# Patient Record
Sex: FEMALE | Race: WHITE | NOT HISPANIC OR LATINO | Employment: PART TIME | URBAN - METROPOLITAN AREA
[De-identification: names, ages, dates, MRNs, and addresses within clinical notes are randomized per-mention and may not be internally consistent; named-entity substitution may affect disease eponyms.]

---

## 2017-02-09 ENCOUNTER — OFFICE VISIT (OUTPATIENT)
Dept: MEDICAL GROUP | Facility: PHYSICIAN GROUP | Age: 37
End: 2017-02-09
Payer: COMMERCIAL

## 2017-02-09 VITALS
BODY MASS INDEX: 22.02 KG/M2 | HEART RATE: 95 BPM | TEMPERATURE: 98.9 F | DIASTOLIC BLOOD PRESSURE: 70 MMHG | OXYGEN SATURATION: 100 % | SYSTOLIC BLOOD PRESSURE: 102 MMHG | HEIGHT: 64 IN | RESPIRATION RATE: 16 BRPM | WEIGHT: 129 LBS

## 2017-02-09 DIAGNOSIS — R68.89 FLU-LIKE SYMPTOMS: ICD-10-CM

## 2017-02-09 DIAGNOSIS — R10.12 LEFT UPPER QUADRANT PAIN: ICD-10-CM

## 2017-02-09 DIAGNOSIS — J01.00 ACUTE NON-RECURRENT MAXILLARY SINUSITIS: ICD-10-CM

## 2017-02-09 DIAGNOSIS — L40.50 PSORIATIC ARTHRITIS (HCC): ICD-10-CM

## 2017-02-09 LAB
FLUAV+FLUBV AG SPEC QL IA: NORMAL
INT CON NEG: NEGATIVE
INT CON POS: POSITIVE

## 2017-02-09 PROCEDURE — 87804 INFLUENZA ASSAY W/OPTIC: CPT | Performed by: NURSE PRACTITIONER

## 2017-02-09 PROCEDURE — 99204 OFFICE O/P NEW MOD 45 MIN: CPT | Performed by: NURSE PRACTITIONER

## 2017-02-09 RX ORDER — ZINC SULFATE 1 MG/ML
INJECTION, SOLUTION INTRAVENOUS
COMMUNITY
End: 2021-01-14

## 2017-02-09 RX ORDER — CHLORAL HYDRATE 500 MG
1000 CAPSULE ORAL
COMMUNITY
End: 2024-03-12

## 2017-02-09 RX ORDER — CHOLECALCIFEROL (VITAMIN D3) 125 MCG
500 CAPSULE ORAL DAILY
COMMUNITY
End: 2022-11-10

## 2017-02-09 RX ORDER — AMOXICILLIN AND CLAVULANATE POTASSIUM 500; 125 MG/1; MG/1
1 TABLET, FILM COATED ORAL 2 TIMES DAILY
Qty: 20 TAB | Refills: 0 | Status: SHIPPED | OUTPATIENT
Start: 2017-02-09 | End: 2017-03-13

## 2017-02-09 RX ORDER — FAMOTIDINE 20 MG/1
20 TABLET, FILM COATED ORAL 2 TIMES DAILY
Qty: 60 TAB | Refills: 0 | Status: SHIPPED | OUTPATIENT
Start: 2017-02-09 | End: 2017-04-24 | Stop reason: SDUPTHER

## 2017-02-09 ASSESSMENT — PATIENT HEALTH QUESTIONNAIRE - PHQ9: CLINICAL INTERPRETATION OF PHQ2 SCORE: 2

## 2017-02-09 ASSESSMENT — PAIN SCALES - GENERAL: PAINLEVEL: NO PAIN

## 2017-02-09 NOTE — ASSESSMENT & PLAN NOTE
Dx in 2014. Followed by rheumatology. Patient does have psoriasis on her scalp. States that with regard to the arthritis she has spine involvement but other pain is mostly in her fingers and feet. Patient is requesting repeat labs for new rheumatology provider. Patient is treating this with diet and over-the-counter ibuprofen dates that is working well this time. Patient does mention feeling of tightness in the muscles on her right side. States she was told in the past that it is related to psoriatic arthritis, but she states it is getting worse. Patient plans to discuss this with new rheumatology provider.

## 2017-02-09 NOTE — PROGRESS NOTES
"Chief Complaint   Patient presents with   • Establish Care     New Patient    • Cough     3 days        HISTORY OF THE PRESENT ILLNESS: This is a 36 y.o. female new patient to our practice. This pleasant patient is here today to discuss multiple issues.    Psoriatic arthritis (CMS-[facility])  Dx in 2014. Followed by rheumatology. Patient does have psoriasis on her scalp. States that with regard to the arthritis she has spine involvement but other pain is mostly in her fingers and feet. Patient is requesting repeat labs for new rheumatology provider. Patient does mention feeling of tightness in the muscles on her right side. States she was told in the past that it is related to psoriatic arthritis, but she states it is getting worse. Patient plans to discuss this with new rheumatology provider.    Left upper quadrant pain  This is a new problem. Patient states that she has noticed pain in the left upper quadrant of her abdomen \"on and off\" patient states that the pain comes and goes she has not found anything specific that makes it worse or makes it better. States the area is tender to the touch and feels \"swollen\". Patient denies changes in stool including constipation or diarrhea, dark tarry stool. Patient denies nausea or vomiting, patient denies burning. States the pain might be related to eating.    Acute non-recurrent maxillary sinusitis  Patient states that she has had significant congestion over the last 2-3 weeks. Patient states that she did run fevers initially and then thought she was getting better recently she has been running fevers and having chills, does have some pressure in her maxillary sinuses, positive for cough and ear fullness. States the cough does not keep her up at night, but that she is noticing some purulent putrid sputum. Patient has been using over-the-counter Flonase as well as decongestants to improve her symptoms.      Past Medical History   Diagnosis Date   • Depression    • Allergy    • " Psoriatic arthritis (CMS-[facility])        Past Surgical History   Procedure Laterality Date   • Primary c section         Family Status   Relation Status Death Age   • Mother Alive    • Father       Family History   Problem Relation Age of Onset   • Psychiatry Mother      depression   • Arthritis Father      psoriatic   • Hypertension Father    • Psychiatry Father      depression   • Cancer Maternal Grandmother    • Cancer Maternal Grandfather      stomach       Social History   Substance Use Topics   • Smoking status: Never Smoker    • Smokeless tobacco: Never Used   • Alcohol Use: Yes       Allergies: Doxycycline    Current Outpatient Prescriptions Ordered in [facility]   Medication Sig Dispense Refill   • sertraline (ZOLOFT) 50 MG Tab Take 50 mg by mouth every day.     • Omega-3 Fatty Acids (FISH OIL) 1000 MG Cap capsule Take 1,000 mg by mouth 3 times a day, with meals.     • zinc sulfate 1 MG/ML Solution by Intravenous route.     • cyanocobalamin (VITAMIN B-12) 500 MCG Tab Take 500 mcg by mouth every day.     • vitamin D (CHOLECALCIFEROL) 1000 UNIT Tab Take 1,000 Units by mouth every day.     • famotidine (PEPCID) 20 MG Tab Take 1 Tab by mouth 2 times a day. 60 Tab 0   • amoxicillin-clavulanate (AUGMENTIN) 500-125 MG Tab Take 1 Tab by mouth 2 times a day. 20 Tab 0     No current Epic-ordered facility-administered medications on file.       Review of Systems   Constitutional: Positive for fever, chills, negative weight loss and malaise/fatigue.   HENT:  Negative for ear pain, nosebleeds, congestion, sore throat and neck pain.    Eyes:  Negative for blurred vision.   Respiratory: Positive for cough, sputum production, negative shortness of breath and wheezing.    Cardiovascular:  Negative for chest pain, palpitations, orthopnea and leg swelling.   Gastrointestinal:  Negative for heartburn, nausea, vomiting and positive abdominal pain.   Genitourinary:  Negative for dysuria, urgency and frequency.   Musculoskeletal:  "Positive for myalgias, back pain and joint pain.   Skin:  Negative for rash and itching.   Neurological:  Negative for dizziness, tingling, tremors, sensory change, focal weakness and headaches.   Endo/Heme/Allergies:  Does not bruise/bleed easily.   Psychiatric/Behavioral:  Negative for depression, anxiety, or memory loss.     All other systems reviewed and are negative except as in HPI.    Exam: Blood pressure 102/70, pulse 95, temperature 37.2 °C (98.9 °F), resp. rate 16, height 1.626 m (5' 4.02\"), weight 58.514 kg (129 lb), last menstrual period 01/09/2017, SpO2 100 %, not currently breastfeeding.  General:  Normal appearing. No distress.  HEENT:  Normocephalic. Eyes conjunctiva clear lids without ptosis, pupils equal and reactive to light accommodation, ears normal shape and contour, canals are clear bilaterally, tympanic membranes are benign, nasal mucosa erythematous, oropharynx is with erythema, no edema or exudates. Tenderness to palpation of maxillary and frontal sinuses.  Neck:  Supple without JVD or bruit. Thyroid is not enlarged.  Pulmonary:  Clear to ausculation.  Normal effort. No rales, ronchi, or wheezing.  Cardiovascular:  Regular rate and rhythm without murmur. Carotid and radial pulses are intact and equal bilaterally.  Abdomen:  Soft, nondistended. Normal bowel sounds. Liver and spleen are not palpable. Positive tenderness in epigastric region, and left upper quadrant. No guarding, no rebound tenderness.  Neurologic:  Grossly nonfocal  Lymph:  No cervical, supraclavicular or axillary lymph nodes are palpable  Skin:  Warm and dry.  No obvious lesions.  Musculoskeletal:  Normal gait. No extremity cyanosis, clubbing, or edema. Strength is 5 out of 5 bilateral lower extremities. Patient has full range of motion in the right arm and right leg. Sensation is intact in right arm and leg.   Psych:  Normal mood and affect. Alert and oriented x3. Judgment and insight is normal.    Medical decision making:  " Nubia is a generally well-appearing 36-year-old female patient here to establish care and discuss flulike symptoms over the last couple weeks as well as left upper quadrant pain and psoriatic arthritis. With regard to psoriatic arthritis referral to rheumatology for [facility] is provided and labs requested by her office are ordered at this time. Lipid profile and comprehensive metabolic profile are also ordered at this time for screening purposes. Ultrasound of abdomen ordered to rule out inflammation of spleen, or other concerning processes as patient does have autoimmune processes which can cause organ swelling. Pepcid 20 mg twice daily recommended at this time related to epigastric pain and possibility of abdominal pain being related to reflux. History of re-sickening suggests acute sinusitis related to a previous viral upper respiratory infection. As such Augmentin 500/125 mg twice daily ×10 days is provided. Point-of-care influenza was negative. ED precautions: seek emergency evaluation for symptoms including but not limited to : crushing chest pain, chest pain associated with difficulty breathing, nausea, or sweats, heart rate irregular or too fast to count.     Please note that this dictation was created using voice recognition software. I have made every reasonable attempt to correct obvious errors, but I expect that there are errors of grammar and possibly content that I did not discover before finalizing the note.      Assessment/Plan  1. Psoriatic arthritis (CMS-HCC)  REFERRAL TO RHEUMATOLOGY    URIC A+DYLON+RA QN+CRP+ASO    WESTERGREN SED RATE    LIPID PROFILE    COMP METABOLIC PANEL   2. Left upper quadrant pain  US-ABDOMEN COMPLETE SURVEY    famotidine (PEPCID) 20 MG Tab    US-ABDOMEN COMPLETE SURVEY   3. Acute non-recurrent maxillary sinusitis  amoxicillin-clavulanate (AUGMENTIN) 500-125 MG Tab   4. Flu-like symptoms  POCT Influenza A/B         I have placed the below orders and discussed them with  an approved delegating provider. The MA is performing the below orders under the direction of Dr. Frias.

## 2017-02-09 NOTE — ASSESSMENT & PLAN NOTE
Patient states that she has had significant congestion over the last 2-3 weeks. Patient states that she did run fevers initially and then thought she was getting better recently she has been running fevers and having chills, does have some pressure in her maxillary sinuses, positive for cough and ear fullness. States the cough does not keep her up at night, but that she is noticing some purulent putrid sputum. Patient has been using over-the-counter Flonase as well as decongestants to improve her symptoms.

## 2017-02-09 NOTE — MR AVS SNAPSHOT
"        Nubia Arce   2017 9:40 AM   Office Visit   MRN: [id number]    Department:  [facility]   Dept Phone:  [phone]    Description:  Female : 1980   Provider:  ETTA Spears           Reason for Visit     Establish Care New Patient     Cough 3 days       Allergies as of 2017     Allergen Noted Reactions    Doxycycline 2017   Vomiting    2016       You were diagnosed with     Psoriatic arthritis (CMS-HCC)   [315576]       Left upper quadrant pain   [359786]       Acute non-recurrent maxillary sinusitis   [7901823]       Flu-like symptoms   [581244]         Vital Signs     Blood Pressure Pulse Temperature Respirations Height Weight    102/70 mmHg 95 37.2 °C (98.9 °F) 16 1.626 m (5' 4.02\") 58.514 kg (129 lb)    Body Mass Index Oxygen Saturation Last Menstrual Period Breastfeeding? Smoking Status       22.13 kg/m2 100% 2017 No Never Smoker        Basic Information     Date Of Birth Sex Race Ethnicity Preferred Language    1980 Female White Non- English      Your appointments     Mar 09, 2017  1:00 PM   Established Patient with ETTA Spears   [facility] (--)    [address]  [address]  [facility] [place] [facility] [id number] [phone]           You will be receiving a confirmation call a few days before your appointment from our automated call confirmation system.              Problem List              ICD-10-CM Priority Class Noted - Resolved    Psoriatic arthritis (CMS-HCC) L40.50   2017 - Present    Left upper quadrant pain R10.12   2017 - Present    Acute non-recurrent maxillary sinusitis J01.00   2017 - Present      Health Maintenance        Date Due Completion Dates    IMM DTaP/Tdap/Td Vaccine (1 - Tdap) 10/29/1998 ---    PAP SMEAR 10/29/2000 ---    IMM INFLUENZA (1) 2016 ---            Current Immunizations     No immunizations on file.      Below and/or attached are the medications your provider " expects you to take. Review all of your home medications and newly ordered medications with your provider and/or pharmacist. Follow medication instructions as directed by your provider and/or pharmacist. Please keep your medication list with you and share with your provider. Update the information when medications are discontinued, doses are changed, or new medications (including over-the-counter products) are added; and carry medication information at all times in the event of emergency situations     Allergies:  DOXYCYCLINE - Vomiting               Medications  Valid as of: February 09, 2017 - 10:51 AM    Generic Name Brand Name Tablet Size Instructions for use    Amoxicillin-Pot Clavulanate (Tab) AUGMENTIN 500-125 MG Take 1 Tab by mouth 2 times a day.        Cholecalciferol (Tab) cholecalciferol 1000 UNIT Take 1,000 Units by mouth every day.        Cyanocobalamin (Tab) VITAMIN B-12 500 MCG Take 500 mcg by mouth every day.        Famotidine (Tab) PEPCID 20 MG Take 1 Tab by mouth 2 times a day.        Omega-3 Fatty Acids (Cap) fish oil 1000 MG Take 1,000 mg by mouth 3 times a day, with meals.        Sertraline HCl (Tab) ZOLOFT 50 MG Take 50 mg by mouth every day.        Zinc Sulfate (Solution) zinc sulfate 1 MG/ML by Intravenous route.        .                 Medicines prescribed today were sent to:     [id number] - [place] - [address] [address]    Phone: [phone] Fax: [phone]    Open 24 Hours?: No      Medication refill instructions:       If your prescription bottle indicates you have medication refills left, it is not necessary to call your provider’s office. Please contact your pharmacy and they will refill your medication.    If your prescription bottle indicates you do not have any refills left, you may request refills at any time through one of the following ways: The online [employer] system (except Urgent Care), by calling your provider’s office, or by asking your  pharmacy to contact your provider’s office with a refill request. Medication refills are processed only during regular business hours and may not be available until the next business day. Your provider may request additional information or to have a follow-up visit with you prior to refilling your medication.   *Please Note: Medication refills are assigned a new Rx number when refilled electronically. Your pharmacy may indicate that no refills were authorized even though a new prescription for the same medication is available at the pharmacy. Please request the medicine by name with the pharmacy before contacting your provider for a refill.        Your To Do List     Future Labs/Procedures Complete By Expires    COMP METABOLIC PANEL  As directed 2/9/2018    LIPID PROFILE  As directed 2/9/2018    WESTERGREN SED RATE  As directed 2/9/2018      Referral     A referral request has been sent to our patient care coordination department. Please allow 3-5 business days for us to process this request and contact you either by phone or mail. If you do not hear from us by the 5th business day, please call us at [phone].           LegalJump Access Code: [email or web address]  Expires: 3/10/2017  9:08 AM    LegalJump  A secure, online tool to manage your health information     [employer]’s LegalJump® is a secure, online tool that connects you to your personalized health information from the privacy of your home -- day or night - making it very easy for you to manage your healthcare. Once the activation process is completed, you can even access your medical information using the LegalJump jose, which is available for free in the Apple Jose store or Google Play store.     LegalJump provides the following levels of access (as shown below):   My Chart Features   [facility] Doctor [facility]  Specialists [facility]  Urgent  Care Non-[facility]  Primary Care  Doctor   Email your healthcare team securely and privately 24/7 X X X    Manage  appointments: schedule your next appointment; view details of past/upcoming appointments X      Request prescription refills. X      View recent personal medical records, including lab and immunizations X X X X   View health record, including health history, allergies, medications X X X X   Read reports about your outpatient visits, procedures, consult and ER notes X X X X   See your discharge summary, which is a recap of your hospital and/or ER visit that includes your diagnosis, lab results, and care plan. X X       How to register for [employer]:  1. Go to  [email or web address].  2. Click on the Sign Up Now box, which takes you to the New Member Sign Up page. You will need to provide the following information:  a. Enter your [employer] Access Code exactly as it appears at the top of this page. (You will not need to use this code after you’ve completed the sign-up process. If you do not sign up before the expiration date, you must request a new code.)   b. Enter your date of birth.   c. Enter your home email address.   d. Click Submit, and follow the next screen’s instructions.  3. Create a [employer] ID. This will be your [employer] login ID and cannot be changed, so think of one that is secure and easy to remember.  4. Create a [employer] password. You can change your password at any time.  5. Enter your Password Reset Question and Answer. This can be used at a later time if you forget your password.   6. Enter your e-mail address. This allows you to receive e-mail notifications when new information is available in [employer].  7. Click Sign Up. You can now view your health information.    For assistance activating your [employer] account, call [phone]

## 2017-02-09 NOTE — ASSESSMENT & PLAN NOTE
"This is a new problem. Patient states that she has noticed pain in the left upper quadrant of her abdomen \"on and off\" patient states that the pain comes and goes she has not found anything specific that makes it worse or makes it better. States the area is tender to the touch and feels \"swollen\". Patient denies changes in stool including constipation or diarrhea, dark tarry stool. Patient denies nausea or vomiting, patient denies burning. States the pain might be related to eating.  "

## 2017-02-13 ENCOUNTER — TELEPHONE (OUTPATIENT)
Dept: MEDICAL GROUP | Facility: PHYSICIAN GROUP | Age: 37
End: 2017-02-13

## 2017-02-13 RX ORDER — SERTRALINE HYDROCHLORIDE 25 MG/1
25 TABLET, FILM COATED ORAL DAILY
Qty: 90 TAB | Refills: 3 | Status: SHIPPED | OUTPATIENT
Start: 2017-02-13 | End: 2017-03-31 | Stop reason: SDUPTHER

## 2017-02-13 NOTE — TELEPHONE ENCOUNTER
Patient left a voicemail requesting Zoloft sent to [facility]. Rx was not sent and she was also saying if the dosage can be lowered to 25mg instead of 50mg. Patient states her and Kamar spoke about this at visit. LM

## 2017-02-16 ENCOUNTER — TELEPHONE (OUTPATIENT)
Dept: MEDICAL GROUP | Facility: PHYSICIAN GROUP | Age: 37
End: 2017-02-16

## 2017-02-16 DIAGNOSIS — R10.12 LEFT UPPER QUADRANT PAIN: ICD-10-CM

## 2017-02-16 NOTE — TELEPHONE ENCOUNTER
1. Caller Name: Nubia Arce                                         Call Back Number: [phone] (home)     2. Message: Patient has been notified Ultrasound has been placed.    3. Patient approves office to leave a detailed voicemail/MyChart message: N\A

## 2017-02-16 NOTE — TELEPHONE ENCOUNTER
1. Caller Name: Nubia Arce                                         Call Back Number: [phone] (home)     2. Message: Unable to reach patient left voicemail to call back.    3. Patient approves office to leave a detailed voicemail/MyChart message: N\A

## 2017-02-16 NOTE — TELEPHONE ENCOUNTER
"1. Caller Name: Nubia Arce                                         Call Back Number: [phone] (home)     2. Message: Pt was calling and stated \" she went to schedule her ultra sound and [facility] said there wasnt a record of that.\" Checked patient chart and it looks like US was cancelled and I let pt know ill follow up with her provider.    3. Patient approves office to leave a detailed voicemail/MyChart message: N\A      "

## 2017-02-21 ENCOUNTER — HOSPITAL ENCOUNTER (OUTPATIENT)
Dept: LAB | Facility: MEDICAL CENTER | Age: 37
End: 2017-02-21
Attending: NURSE PRACTITIONER
Payer: COMMERCIAL

## 2017-02-21 LAB
CRP SERPL HS-MCNC: 0.05 MG/DL (ref 0–0.75)
RHEUMATOID FACT SERPL-ACNC: <10 IU/ML (ref 0–14)
URATE SERPL-MCNC: 3.8 MG/DL (ref 1.9–8.2)

## 2017-02-21 PROCEDURE — 36415 COLL VENOUS BLD VENIPUNCTURE: CPT

## 2017-02-21 PROCEDURE — 86060 ANTISTREPTOLYSIN O TITER: CPT

## 2017-02-21 PROCEDURE — 84550 ASSAY OF BLOOD/URIC ACID: CPT

## 2017-02-21 PROCEDURE — 86140 C-REACTIVE PROTEIN: CPT

## 2017-02-21 PROCEDURE — 86038 ANTINUCLEAR ANTIBODIES: CPT

## 2017-02-21 PROCEDURE — 86431 RHEUMATOID FACTOR QUANT: CPT

## 2017-02-23 ENCOUNTER — TELEPHONE (OUTPATIENT)
Dept: MEDICAL GROUP | Facility: PHYSICIAN GROUP | Age: 37
End: 2017-02-23

## 2017-02-23 LAB
ASO AB SERPL-ACNC: <55 IU/ML (ref 0–330)
NUCLEAR IGG SER QL IA: NORMAL

## 2017-02-23 NOTE — TELEPHONE ENCOUNTER
----- Message from NIKHIL SpearsPMELI sent at 2/23/2017  8:00 AM PST -----  Please call pt and give lab results: DYLON is negative, Antistreptolysin O Titer is less than 55, here It is 3.8, CRP is 0.05, rheumatoid factor is negative.

## 2017-02-23 NOTE — TELEPHONE ENCOUNTER
1. Caller Name: Nubia Arce                                         Call Back Number: [phone] (home)     2. Message: Patient called and has been notified. Pt requested that I send her labs to Dr. Martinez office for her rheumatology apt.     3. Patient approves office to leave a detailed voicemail/MyChart message: N\A

## 2017-02-23 NOTE — TELEPHONE ENCOUNTER
1. Caller Name: Nubia Arce                                         Call Back Number: [phone] (home)     2. Message: Unable to reach patient left voicemail to call back.     3. Patient approves office to leave a detailed voicemail/MyChart message: N\A

## 2017-03-09 ENCOUNTER — TELEPHONE (OUTPATIENT)
Dept: MEDICAL GROUP | Facility: PHYSICIAN GROUP | Age: 37
End: 2017-03-09

## 2017-03-09 ENCOUNTER — HOSPITAL ENCOUNTER (OUTPATIENT)
Dept: RADIOLOGY | Facility: MEDICAL CENTER | Age: 37
End: 2017-03-09
Attending: NURSE PRACTITIONER
Payer: COMMERCIAL

## 2017-03-09 ENCOUNTER — APPOINTMENT (OUTPATIENT)
Dept: MEDICAL GROUP | Facility: PHYSICIAN GROUP | Age: 37
End: 2017-03-09
Payer: COMMERCIAL

## 2017-03-09 DIAGNOSIS — R10.12 LEFT UPPER QUADRANT PAIN: ICD-10-CM

## 2017-03-09 PROCEDURE — 76700 US EXAM ABDOM COMPLETE: CPT

## 2017-03-09 NOTE — TELEPHONE ENCOUNTER
1. Caller Name: Nubia Arce                                         Call Back Number: [phone] (home)     2. Message: Pt notified of results below.     3. Patient approves office to leave a detailed voicemail/MyChart message: N\A

## 2017-03-09 NOTE — TELEPHONE ENCOUNTER
----- Message from ETTA Spears sent at 3/9/2017  1:22 PM PST -----  Please call pt and give lab results: Ultrasound of abdomen is within normal limits. Let me know if you have any questions, we'll see you 3/13/17!

## 2017-03-09 NOTE — TELEPHONE ENCOUNTER
1. Caller Name: Nubia Arce                                         Call Back Number: [phone] (home)     2. Message: Unable to reach patient , left vm to call back.     3. Patient approves office to leave a detailed voicemail/MyChart message: N\A

## 2017-03-13 ENCOUNTER — OFFICE VISIT (OUTPATIENT)
Dept: MEDICAL GROUP | Facility: PHYSICIAN GROUP | Age: 37
End: 2017-03-13
Payer: COMMERCIAL

## 2017-03-13 VITALS
HEIGHT: 64 IN | RESPIRATION RATE: 16 BRPM | WEIGHT: 130 LBS | BODY MASS INDEX: 22.2 KG/M2 | TEMPERATURE: 98.1 F | HEART RATE: 81 BPM | OXYGEN SATURATION: 100 % | SYSTOLIC BLOOD PRESSURE: 110 MMHG | DIASTOLIC BLOOD PRESSURE: 70 MMHG

## 2017-03-13 DIAGNOSIS — L40.50 PSORIATIC ARTHRITIS (HCC): ICD-10-CM

## 2017-03-13 DIAGNOSIS — M54.2 NECK PAIN: ICD-10-CM

## 2017-03-13 DIAGNOSIS — R10.12 LEFT UPPER QUADRANT PAIN: ICD-10-CM

## 2017-03-13 PROBLEM — J01.00 ACUTE NON-RECURRENT MAXILLARY SINUSITIS: Status: RESOLVED | Noted: 2017-02-09 | Resolved: 2017-03-13

## 2017-03-13 PROCEDURE — 99214 OFFICE O/P EST MOD 30 MIN: CPT | Performed by: NURSE PRACTITIONER

## 2017-03-13 RX ORDER — CYCLOBENZAPRINE HCL 5 MG
5-10 TABLET ORAL 3 TIMES DAILY PRN
Qty: 15 TAB | Refills: 0 | Status: SHIPPED | OUTPATIENT
Start: 2017-03-13 | End: 2017-06-30

## 2017-03-13 ASSESSMENT — PAIN SCALES - GENERAL: PAINLEVEL: 3=SLIGHT PAIN

## 2017-03-13 NOTE — Clinical Note
[facility]  JOSE SpearsRDOLLY  [address]  [place] [address]  Fax: [phone]   Authorization for Release/Disclosure of   Protected Health Information   Name: NUBIA ARCE : 1980 SSN: [id number]   Address: [address] [place] [address].   [place] [address] Phone:    [phone] (home)    I authorize the entity listed below to release/disclose the PHI below to:   [facility]/JOSE SpearsRDOLLY and ETTA Spears   Provider or Entity Name:  Riya jimenez    Address   City, State, Zip   Phone:      Fax:     Reason for request: continuity of care   Information to be released:    [  ] LAST COLONOSCOPY,  including any PATH REPORT and follow-up  [  ] LAST FIT/COLOGUARD RESULT [  ] LAST DEXA  [  ] LAST MAMMOGRAM  [  ] LAST PAP  [  ] LAST LABS [  ] RETINA EXAM REPORT  [  ] IMMUNIZATION RECORDS  [  ] Release all info      [  ] Check here and initial the line next to each item to release ALL health information INCLUDING  _____ Care and treatment for drug and / or alcohol abuse  _____ HIV testing, infection status, or AIDS  _____ Genetic Testing    DATES OF SERVICE OR TIME PERIOD TO BE DISCLOSED: _____________  I understand and acknowledge that:  * This Authorization may be revoked at any time by you in writing, except if your health information has already been used or disclosed.  * Your health information that will be used or disclosed as a result of you signing this authorization could be re-disclosed by the recipient. If this occurs, your re-disclosed health information may no longer be protected by State or Federal laws.  * You may refuse to sign this Authorization. Your refusal will not affect your ability to obtain treatment.  * This Authorization becomes effective upon signing and will  on (date) __________.      If no date is indicated, this Authorization will  one (1) year from the signature date.    Name: Nubia Arce    Signature:   Date:       3/13/2017       PLEASE FAX REQUESTED RECORDS BACK TO: [phone]
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
## 2017-03-13 NOTE — ASSESSMENT & PLAN NOTE
"Patient continues to have left upper quadrant pain. States she notices it \"on and off\". Patient states that taking over-the-counter Zantac did improve pain. Abdominal ultrasound was negative. Patient does not note pain to palpation at this time. Patient denies change in stool including constipation diarrhea dark tarry stool. Patient denies nausea vomiting patient denies burning in her epigastric reason. Patient states the pain does sometimes get worse related to eating. Discussed that if this continues and does not resolve with daily use of Zantac we will consider referral to gastroenterology. Patient is encouraged to have CMP, lipid panel drawn at this time.  "
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
## 2017-03-13 NOTE — MR AVS SNAPSHOT
"Nubia Abdondaylin   3/13/2017 7:00 AM   Office Visit   MRN: [id number]    Department:  [facility]   Dept Phone:  [phone]    Description:  Female : 1980   Provider:  ETTA Spears           Reason for Visit     Follow-Up 1 Month Follow UP       Allergies as of 3/13/2017     Allergen Noted Reactions    Doxycycline 2017   Vomiting    2016       You were diagnosed with     Neck pain   [2013]       Psoriatic arthritis (CMS-HCC)   [2062]         Vital Signs     Blood Pressure Pulse Temperature Respirations Height Weight    110/70 mmHg 81 36.7 °C (98.1 °F) 16 1.626 m (5' 4\") 58.968 kg (130 lb)    Body Mass Index Oxygen Saturation Breastfeeding? Smoking Status          22.30 kg/m2 100% No Never Smoker         Basic Information     Date Of Birth Sex Race Ethnicity Preferred Language    1980 Female White Non- English      Problem List              ICD-10-CM Priority Class Noted - Resolved    Psoriatic arthritis (CMS-HCC) L40.50   2017 - Present    Left upper quadrant pain R10.12   2017 - Present    Neck pain M54.2   3/13/2017 - Present      Health Maintenance        Date Due Completion Dates    IMM DTaP/Tdap/Td Vaccine (1 - Tdap) 10/29/1998 ---    PAP SMEAR 10/29/2000 ---    IMM INFLUENZA (1) 2016 ---            Current Immunizations     No immunizations on file.      Below and/or attached are the medications your provider expects you to take. Review all of your home medications and newly ordered medications with your provider and/or pharmacist. Follow medication instructions as directed by your provider and/or pharmacist. Please keep your medication list with you and share with your provider. Update the information when medications are discontinued, doses are changed, or new medications (including over-the-counter products) are added; and carry medication information at all times in the event of emergency situations     Allergies:  DOXYCYCLINE - " Vomiting               Medications  Valid as of: March 13, 2017 -  7:37 AM    Generic Name Brand Name Tablet Size Instructions for use    Cholecalciferol (Tab) cholecalciferol 1000 UNIT Take 1,000 Units by mouth every day.        Cyanocobalamin (Tab) VITAMIN B-12 500 MCG Take 500 mcg by mouth every day.        Cyclobenzaprine HCl (Tab) FLEXERIL 5 MG Take 1-2 Tabs by mouth 3 times a day as needed.        Famotidine (Tab) PEPCID 20 MG Take 1 Tab by mouth 2 times a day.        Omega-3 Fatty Acids (Cap) fish oil 1000 MG Take 1,000 mg by mouth 3 times a day, with meals.        Sertraline HCl (Tab) ZOLOFT 25 MG Take 1 Tab by mouth every day.        Zinc Sulfate (Solution) zinc sulfate 1 MG/ML by Intravenous route.        .                 Medicines prescribed today were sent to:     [id number] - [place] - [address] [place] [address]    Phone: [phone] Fax: [phone]    Open 24 Hours?: No      Medication refill instructions:       If your prescription bottle indicates you have medication refills left, it is not necessary to call your provider’s office. Please contact your pharmacy and they will refill your medication.    If your prescription bottle indicates you do not have any refills left, you may request refills at any time through one of the following ways: The online [employer] system (except Urgent Care), by calling your provider’s office, or by asking your pharmacy to contact your provider’s office with a refill request. Medication refills are processed only during regular business hours and may not be available until the next business day. Your provider may request additional information or to have a follow-up visit with you prior to refilling your medication.   *Please Note: Medication refills are assigned a new Rx number when refilled electronically. Your pharmacy may indicate that no refills were authorized even though a new prescription for the same medication is available at the  pharmacy. Please request the medicine by name with the pharmacy before contacting your provider for a refill.        Referral     A referral request has been sent to our patient care coordination department. Please allow 3-5 business days for us to process this request and contact you either by phone or mail. If you do not hear from us by the 5th business day, please call us at [phone].           YouDo Access Code: Activation code not generated  Current YouDo Status: Active
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
## 2017-03-31 ENCOUNTER — PATIENT MESSAGE (OUTPATIENT)
Dept: MEDICAL GROUP | Facility: PHYSICIAN GROUP | Age: 37
End: 2017-03-31

## 2017-03-31 NOTE — TELEPHONE ENCOUNTER
Please call pharmacy and tell them her dose is being increased to 50mg (two 25mg tablets) daily. It is okay to give her an emergency fill. Please help her make an appointment with Kamar to follow-up.  Salome Rodriguez M.D.

## 2017-04-24 ENCOUNTER — HOSPITAL ENCOUNTER (OUTPATIENT)
Dept: LAB | Facility: MEDICAL CENTER | Age: 37
End: 2017-04-24
Attending: NURSE PRACTITIONER
Payer: COMMERCIAL

## 2017-04-24 DIAGNOSIS — L40.50 PSORIATIC ARTHRITIS (HCC): ICD-10-CM

## 2017-04-24 LAB
ALBUMIN SERPL BCP-MCNC: 4.2 G/DL (ref 3.2–4.9)
ALBUMIN/GLOB SERPL: 1.6 G/DL
ALP SERPL-CCNC: 29 U/L (ref 30–99)
ALT SERPL-CCNC: 12 U/L (ref 2–50)
ANION GAP SERPL CALC-SCNC: 5 MMOL/L (ref 0–11.9)
AST SERPL-CCNC: 14 U/L (ref 12–45)
BASOPHILS # BLD AUTO: 1.3 % (ref 0–1.8)
BASOPHILS # BLD: 0.05 K/UL (ref 0–0.12)
BILIRUB SERPL-MCNC: 0.5 MG/DL (ref 0.1–1.5)
BUN SERPL-MCNC: 13 MG/DL (ref 8–22)
CALCIUM SERPL-MCNC: 8.9 MG/DL (ref 8.5–10.5)
CHLORIDE SERPL-SCNC: 106 MMOL/L (ref 96–112)
CHOLEST SERPL-MCNC: 167 MG/DL (ref 100–199)
CK SERPL-CCNC: 37 U/L (ref 0–154)
CO2 SERPL-SCNC: 25 MMOL/L (ref 20–33)
CREAT SERPL-MCNC: 0.69 MG/DL (ref 0.5–1.4)
EOSINOPHIL # BLD AUTO: 0.08 K/UL (ref 0–0.51)
EOSINOPHIL NFR BLD: 2.1 % (ref 0–6.9)
ERYTHROCYTE [DISTWIDTH] IN BLOOD BY AUTOMATED COUNT: 41.8 FL (ref 35.9–50)
ERYTHROCYTE [SEDIMENTATION RATE] IN BLOOD BY WESTERGREN METHOD: 6 MM/HOUR (ref 0–20)
GFR SERPL CREATININE-BSD FRML MDRD: >60 ML/MIN/1.73 M 2
GLOBULIN SER CALC-MCNC: 2.7 G/DL (ref 1.9–3.5)
GLUCOSE SERPL-MCNC: 91 MG/DL (ref 65–99)
HCT VFR BLD AUTO: 42.6 % (ref 37–47)
HDLC SERPL-MCNC: 59 MG/DL
HGB BLD-MCNC: 13.4 G/DL (ref 12–16)
IMM GRANULOCYTES # BLD AUTO: 0.01 K/UL (ref 0–0.11)
IMM GRANULOCYTES NFR BLD AUTO: 0.3 % (ref 0–0.9)
LDLC SERPL CALC-MCNC: 93 MG/DL
LYMPHOCYTES # BLD AUTO: 1.47 K/UL (ref 1–4.8)
LYMPHOCYTES NFR BLD: 38.2 % (ref 22–41)
MCH RBC QN AUTO: 25 PG (ref 27–33)
MCHC RBC AUTO-ENTMCNC: 31.5 G/DL (ref 33.6–35)
MCV RBC AUTO: 79.5 FL (ref 81.4–97.8)
MONOCYTES # BLD AUTO: 0.24 K/UL (ref 0–0.85)
MONOCYTES NFR BLD AUTO: 6.2 % (ref 0–13.4)
NEUTROPHILS # BLD AUTO: 2 K/UL (ref 2–7.15)
NEUTROPHILS NFR BLD: 51.9 % (ref 44–72)
NRBC # BLD AUTO: 0 K/UL
NRBC BLD AUTO-RTO: 0 /100 WBC
PLATELET # BLD AUTO: 248 K/UL (ref 164–446)
PMV BLD AUTO: 10.1 FL (ref 9–12.9)
POTASSIUM SERPL-SCNC: 4.3 MMOL/L (ref 3.6–5.5)
PROT SERPL-MCNC: 6.9 G/DL (ref 6–8.2)
RBC # BLD AUTO: 5.36 M/UL (ref 4.2–5.4)
SODIUM SERPL-SCNC: 136 MMOL/L (ref 135–145)
TRIGL SERPL-MCNC: 73 MG/DL (ref 0–149)
WBC # BLD AUTO: 3.9 K/UL (ref 4.8–10.8)

## 2017-04-24 PROCEDURE — 36415 COLL VENOUS BLD VENIPUNCTURE: CPT

## 2017-04-24 PROCEDURE — 85025 COMPLETE CBC W/AUTO DIFF WBC: CPT

## 2017-04-24 PROCEDURE — 85652 RBC SED RATE AUTOMATED: CPT

## 2017-04-24 PROCEDURE — 80061 LIPID PANEL: CPT

## 2017-04-24 PROCEDURE — 82550 ASSAY OF CK (CPK): CPT

## 2017-04-24 PROCEDURE — 80053 COMPREHEN METABOLIC PANEL: CPT

## 2017-04-25 ENCOUNTER — TELEPHONE (OUTPATIENT)
Dept: MEDICAL GROUP | Facility: PHYSICIAN GROUP | Age: 37
End: 2017-04-25

## 2017-04-26 NOTE — TELEPHONE ENCOUNTER
----- Message from ETTA Spears sent at 4/25/2017  4:54 PM PDT -----  Please call pt and give lab results: Labs are within normal limits.     Plan to recheck in one year.

## 2017-04-26 NOTE — TELEPHONE ENCOUNTER
1. Caller Name: Nubia Arce                                           Call Back Number: [phone] (home)         Patient approves a detailed voicemail message: N\A    Called and left patient a voicemail to call back and get results from labs.

## 2017-04-28 ENCOUNTER — TELEPHONE (OUTPATIENT)
Dept: MEDICAL GROUP | Facility: PHYSICIAN GROUP | Age: 37
End: 2017-04-28

## 2017-04-28 NOTE — TELEPHONE ENCOUNTER
Patient called and left a voicemail asking if Kamar would explain her results to her from rheumatology and ones Kamar ordered. Patient saw the results in [facility] and saw her rheumatologist 04/27/2017 but the provider went over them quickly and did not really explain. Patient also saw white blood cells being low and would want to know if theres anything she should do. LM

## 2017-04-29 NOTE — TELEPHONE ENCOUNTER
"Viraj with patient regarding lab results including what low white blood cells \"leukopenia\" means discussed [facility] and [facility]. Status with patient that at this point we will likely repeat check this in one month to see if it is going up going down her staying stable. Patient continues to express concern regarding this as such I will discuss this with Dr. Frias on Monday.   "

## 2017-05-01 ENCOUNTER — TELEPHONE (OUTPATIENT)
Dept: MEDICAL GROUP | Facility: PHYSICIAN GROUP | Age: 37
End: 2017-05-01

## 2017-05-02 NOTE — TELEPHONE ENCOUNTER
Spoke with patient on the phone regarding discussion with Dr. Frias. Per patient this is consistent with previous CBCs. Patient states she did call rheumatology to find out about following up regarding this. Patient states that she will discuss this with rheumatology who will likely monitor and manage this but that she will schedule an appointment with this provider if necessary to follow-up on this issue.

## 2017-05-02 NOTE — TELEPHONE ENCOUNTER
----- Message from Maisha Frias M.D. sent at 5/1/2017  1:21 PM PDT -----  Regarding: RE: Labs  There is no previous CBC to compare. I will just monitor this and recheck in a month since this is just a mild problem. Absolute neutrophil count is within normal range. I would be concerned if the absolute neutrophil count is below 800.    Maisha Frias MD  ----- Message -----     From: Kamar Perez, A.P.RDOLLY     Sent: 5/1/2017  12:59 PM       To: Maisha Frias M.D.  Subject: Labs                                             Labs ordered by rheumatology, patient is concerned about WBC 3.9, history of psoriatic arthritis. She said Rheum was not concerned. What would be your recommendation based on her CBC?    Thank you!    Kamar

## 2017-06-30 ENCOUNTER — OFFICE VISIT (OUTPATIENT)
Dept: MEDICAL GROUP | Facility: PHYSICIAN GROUP | Age: 37
End: 2017-06-30
Payer: COMMERCIAL

## 2017-06-30 VITALS
WEIGHT: 133 LBS | TEMPERATURE: 98.1 F | BODY MASS INDEX: 22.71 KG/M2 | SYSTOLIC BLOOD PRESSURE: 108 MMHG | DIASTOLIC BLOOD PRESSURE: 60 MMHG | HEART RATE: 75 BPM | OXYGEN SATURATION: 99 % | RESPIRATION RATE: 16 BRPM | HEIGHT: 64 IN

## 2017-06-30 DIAGNOSIS — L40.50 PSORIATIC ARTHRITIS (HCC): ICD-10-CM

## 2017-06-30 DIAGNOSIS — K21.9 GASTROESOPHAGEAL REFLUX DISEASE, ESOPHAGITIS PRESENCE NOT SPECIFIED: ICD-10-CM

## 2017-06-30 DIAGNOSIS — M94.0 ACUTE COSTOCHONDRITIS: ICD-10-CM

## 2017-06-30 DIAGNOSIS — R20.8 DECREASED SENSATION OF LOWER EXTREMITY: ICD-10-CM

## 2017-06-30 PROCEDURE — 99214 OFFICE O/P EST MOD 30 MIN: CPT | Performed by: NURSE PRACTITIONER

## 2017-06-30 RX ORDER — OMEPRAZOLE 40 MG/1
40 CAPSULE, DELAYED RELEASE ORAL DAILY
Qty: 60 CAP | Refills: 0 | Status: SHIPPED | OUTPATIENT
Start: 2017-06-30 | End: 2017-09-05

## 2017-06-30 ASSESSMENT — PAIN SCALES - GENERAL: PAINLEVEL: 3=SLIGHT PAIN

## 2017-06-30 NOTE — ASSESSMENT & PLAN NOTE
Chronic in nature. Patient has been following up with rheumatology. States that she feels that rheumatology is very interested in medication management. States that she is using over-the-counter ibuprofen intermittently but that recently this has been increasing her reflux symptoms. Patient states she will continue follow-up with rheumatology regarding this issue.

## 2017-06-30 NOTE — ASSESSMENT & PLAN NOTE
This is a new problem. Patient states that since she has noticed worsening reflux symptoms she has also noticed tenderness in her left rib cage. States that it hurts worse when she stretches her arm up, when she presses on the spaces between her ribs. States that she feels this is consistent with inflammation related to her psoriatic arthritis and plans to discuss this with her rheumatologist. States as this increases she has increased her use of ibuprofen. States the pain is constant aching, which worsens with movement. States she has not tried anything to make it worse or better.
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
## 2017-06-30 NOTE — ASSESSMENT & PLAN NOTE
Chronic in nature. Patient continues to have reflux symptoms and continues to deny tenderness in the epigastric region but states that she is having burning, increased irritation in her stomach. Patient continues to have left upper quadrant aching. States she notices it more constantly at this point. Patient states that taking famotidine did improve pain, but that it has been worsening since her recent trip as she was eating a poor diet. Abdominal ultrasound was negative. Patient does have tenderness to palpation of the   between her ribs. Patient denies change in stool including constipation diarrhea dark tarry stool. Patient denies nausea vomiting patient denies burning in her epigastric reason. Patient states the pain does sometimes get worse related to eating. H. pylori stool is ordered at this time and patient will start omeprazole 40 mg daily plan will be to continue this over 2 months.

## 2017-06-30 NOTE — MR AVS SNAPSHOT
"        Nubia Wisdomivelisse   2017 7:40 AM   Office Visit   MRN: [id number]    Department:  [facility]   Dept Phone:  [phone]    Description:  Female : 1980   Provider:  ETTA Spears           Reason for Visit     Abdominal Pain x 2 months     Itching L side       Allergies as of 2017     Allergen Noted Reactions    Doxycycline 2017   Vomiting    2016       You were diagnosed with     Gastroesophageal reflux disease, esophagitis presence not specified   [0090549]       Acute costochondritis   [[id number]]       Decreased sensation of lower extremity   [[id number]]         Vital Signs     Blood Pressure Pulse Temperature Respirations Height Weight    108/60 mmHg 75 36.7 °C (98.1 °F) 16 1.626 m (5' 4\") 60.328 kg (133 lb)    Body Mass Index Oxygen Saturation Last Menstrual Period Breastfeeding? Smoking Status       22.82 kg/m2 99% 2017 No Never Smoker        Basic Information     Date Of Birth Sex Race Ethnicity Preferred Language    1980 Female White Non- English      Your appointments     Aug 03, 2017  7:40 AM   Established Patient with ETTA Spears   [facility] (--)    [address]  [address]  [facility] [address] [phone]           You will be receiving a confirmation call a few days before your appointment from our automated call confirmation system.              Problem List              ICD-10-CM Priority Class Noted - Resolved    Psoriatic arthritis (CMS-HCC) L40.50   2017 - Present    Left upper quadrant pain R10.12   2017 - Present    Neck pain M54.2   3/13/2017 - Present      Health Maintenance        Date Due Completion Dates    IMM DTaP/Tdap/Td Vaccine (1 - Tdap) 10/29/1998 ---    PAP SMEAR 10/29/2000 ---            Current Immunizations     No immunizations on file.      Below and/or attached are the medications your provider expects you to take. Review all of your home medications and newly " ordered medications with your provider and/or pharmacist. Follow medication instructions as directed by your provider and/or pharmacist. Please keep your medication list with you and share with your provider. Update the information when medications are discontinued, doses are changed, or new medications (including over-the-counter products) are added; and carry medication information at all times in the event of emergency situations     Allergies:  DOXYCYCLINE - Vomiting               Medications  Valid as of: June 30, 2017 -  8:32 AM    Generic Name Brand Name Tablet Size Instructions for use    Cholecalciferol (Tab) cholecalciferol 1000 UNIT Take 1,000 Units by mouth every day.        Cyanocobalamin (Tab) VITAMIN B-12 500 MCG Take 500 mcg by mouth every day.        Famotidine (Tab) PEPCID 20 MG TAKE 1 TAB BY MOUTH 2 TIMES A DAY.        Omega-3 Fatty Acids (Cap) fish oil 1000 MG Take 1,000 mg by mouth 3 times a day, with meals.        Omeprazole (CAPSULE DELAYED RELEASE) PRILOSEC 40 MG Take 1 Cap by mouth every day.        Zinc Sulfate (Solution) zinc sulfate 1 MG/ML by Intravenous route.        .                 Medicines prescribed today were sent to:     [id number] - [place] - [address] [place] [address]    Phone: [phone] Fax: [phone]    Open 24 Hours?: No      Medication refill instructions:       If your prescription bottle indicates you have medication refills left, it is not necessary to call your provider’s office. Please contact your pharmacy and they will refill your medication.    If your prescription bottle indicates you do not have any refills left, you may request refills at any time through one of the following ways: The online [employer] system (except Urgent Care), by calling your provider’s office, or by asking your pharmacy to contact your provider’s office with a refill request. Medication refills are processed only during regular business hours and may not be  available until the next business day. Your provider may request additional information or to have a follow-up visit with you prior to refilling your medication.   *Please Note: Medication refills are assigned a new Rx number when refilled electronically. Your pharmacy may indicate that no refills were authorized even though a new prescription for the same medication is available at the pharmacy. Please request the medicine by name with the pharmacy before contacting your provider for a refill.        Your To Do List     Future Labs/Procedures Complete By Expires    H.PYLORI STOOL ANTIGEN  As directed 6/30/2018      Referral     A referral request has been sent to our patient care coordination department. Please allow 3-5 business days for us to process this request and contact you either by phone or mail. If you do not hear from us by the 5th business day, please call us at [phone].           [employer] Access Code: Activation code not generated  Current [employer] Status: Active

## 2017-07-03 DIAGNOSIS — R20.8 DECREASED SENSATION OF LOWER EXTREMITY: ICD-10-CM

## 2017-07-07 ENCOUNTER — HOSPITAL ENCOUNTER (OUTPATIENT)
Facility: MEDICAL CENTER | Age: 37
End: 2017-07-07
Attending: NURSE PRACTITIONER
Payer: COMMERCIAL

## 2017-07-07 PROCEDURE — 87338 HPYLORI STOOL AG IA: CPT

## 2017-07-10 DIAGNOSIS — K21.9 GASTROESOPHAGEAL REFLUX DISEASE, ESOPHAGITIS PRESENCE NOT SPECIFIED: ICD-10-CM

## 2017-07-10 LAB — H PYLORI AG STL QL IA: NOT DETECTED

## 2017-07-11 ENCOUNTER — TELEPHONE (OUTPATIENT)
Dept: MEDICAL GROUP | Facility: PHYSICIAN GROUP | Age: 37
End: 2017-07-11

## 2017-07-11 NOTE — TELEPHONE ENCOUNTER
----- Message from ETTA Spears sent at 7/11/2017  6:59 AM PDT -----  Please call pt and give lab results: H. pylori was negative. Please continue omeprazole at this time and keep follow-up 8/3/2017.

## 2017-07-11 NOTE — TELEPHONE ENCOUNTER
VOICEMAIL  1. Caller Name: Nubia Arce                        Call Back Number: [phone] (home)       2. Message: Called and left patient a message to call back to get lab results. Lm     3. Patient approves office to leave a detailed voicemail/MyChart message: N\A

## 2017-09-05 ENCOUNTER — OFFICE VISIT (OUTPATIENT)
Dept: MEDICAL GROUP | Facility: PHYSICIAN GROUP | Age: 37
End: 2017-09-05
Payer: COMMERCIAL

## 2017-09-05 ENCOUNTER — APPOINTMENT (OUTPATIENT)
Dept: MEDICAL GROUP | Facility: PHYSICIAN GROUP | Age: 37
End: 2017-09-05
Payer: COMMERCIAL

## 2017-09-05 VITALS
WEIGHT: 135 LBS | HEART RATE: 80 BPM | RESPIRATION RATE: 16 BRPM | DIASTOLIC BLOOD PRESSURE: 68 MMHG | OXYGEN SATURATION: 100 % | HEIGHT: 64 IN | SYSTOLIC BLOOD PRESSURE: 116 MMHG | TEMPERATURE: 98.8 F | BODY MASS INDEX: 23.05 KG/M2

## 2017-09-05 DIAGNOSIS — R20.8 DECREASED SENSATION OF LOWER EXTREMITY: ICD-10-CM

## 2017-09-05 DIAGNOSIS — K21.9 GASTROESOPHAGEAL REFLUX DISEASE, ESOPHAGITIS PRESENCE NOT SPECIFIED: ICD-10-CM

## 2017-09-05 DIAGNOSIS — L40.50 PSORIATIC ARTHRITIS (HCC): ICD-10-CM

## 2017-09-05 DIAGNOSIS — M54.2 NECK PAIN: ICD-10-CM

## 2017-09-05 DIAGNOSIS — R20.0 NUMBNESS AND TINGLING OF LEFT SIDE OF FACE: ICD-10-CM

## 2017-09-05 DIAGNOSIS — R20.2 NUMBNESS AND TINGLING OF LEFT SIDE OF FACE: ICD-10-CM

## 2017-09-05 DIAGNOSIS — R10.12 LEFT UPPER QUADRANT PAIN: ICD-10-CM

## 2017-09-05 PROBLEM — M94.0 ACUTE COSTOCHONDRITIS: Status: RESOLVED | Noted: 2017-06-30 | Resolved: 2017-09-05

## 2017-09-05 PROCEDURE — 99215 OFFICE O/P EST HI 40 MIN: CPT | Performed by: NURSE PRACTITIONER

## 2017-09-05 RX ORDER — NALTREXONE HYDROCHLORIDE 50 MG/1
1 TABLET, FILM COATED ORAL DAILY
COMMUNITY
End: 2017-12-29

## 2017-09-05 RX ORDER — OMEPRAZOLE 20 MG/1
20 CAPSULE, DELAYED RELEASE ORAL DAILY
Qty: 90 CAP | Refills: 3 | Status: SHIPPED | OUTPATIENT
Start: 2017-09-05 | End: 2018-11-07

## 2017-09-05 ASSESSMENT — PAIN SCALES - GENERAL: PAINLEVEL: NO PAIN

## 2017-09-05 NOTE — ASSESSMENT & PLAN NOTE
Chronic in nature. Worsening. She states she has this pain on and off. Patient states she feels like omeprazole helps. Patient does have tenderness when she palpates her epigastric area as well as her left upper quadrant states that pain radiates through to her back patient also notices itching in these areas. Patient denies changes in stool including constipation, diarrhea, dark tarry stool. Patient does not notice nausea or vomiting. Patient states that burning in her epigastric region has improved. Labs are generally within normal limits. Patient also reports feeling of swelling near her ribs on states she does have some mild shortness of breath on and off. States she has a band of tight muscles from her left hip to her left neck. Patient does report tenderness to palpation of her ribs and spaces between her ribs as well.

## 2017-09-05 NOTE — ASSESSMENT & PLAN NOTE
"Chronic in nature. New problem for this provider. States this started approximately 2 years ago. Patient states that over the last \"little while\" she has noticed numbness, tingling on the left side of her face, covering the entire cheek. Patient states this is intermittent. Patient describes it as numb. States that sometimes she notices the tip of her tongue will also be numb. Patient does not notice anything that makes it better or worse. Patient has not noticed anything that triggers it. Patient states that she has discussed this with rheumatology who does not believe it is related to her psoriatic arthritis. Patient states that numbness does happen when she does not have a flare, states that it does sometimes happen when she feels more stressed.  "

## 2017-09-05 NOTE — ASSESSMENT & PLAN NOTE
"Patient continues to have left neck pain along the anterior neck. States it is now tender to palpation. Patient continues to have upper back and shoulder pain as well.states full ROM is intact, \"I just feel inflamed\". Patient is not having any numbness, tingling, shooting burning pain down her arms.  "

## 2017-09-05 NOTE — PROGRESS NOTES
"Chief Complaint   Patient presents with   • Tingling     Face x 2 years; 2 weeks more frequent        HISTORY OF PRESENT ILLNESS: Patient is a 36 y.o. female established patient who presents today to discuss multiple issues.    Psoriatic arthritis (CMS-[facility])  Chronic in nature. Patient continues to follow with Dr. Leon's rheumatology. Patient also also seeing another provider to have hormone levels checked and to complete low- dose naltrexone therapy patient does not provide provider's name or give further details about provider specialty. States she feels \"more inflammation\". Patient has not used ibuprofen since her last visit. Continue follow-up with rheumatology.    Left upper quadrant pain  Chronic in nature. Worsening. She states she has this pain on and off. Patient states she feels like omeprazole helps. Patient does have tenderness when she palpates her epigastric area as well as her left upper quadrant states that pain radiates through to her back patient also notices itching in these areas. Patient denies changes in stool including constipation, diarrhea, dark tarry stool. Patient does not notice nausea or vomiting. Patient states that burning in her epigastric region has improved. Labs are generally within normal limits. Patient also reports feeling of swelling near her ribs on states she does have some mild shortness of breath on and off. States she has a band of tight muscles from her left hip to her left neck.     Neck pain  Patient continues to have left neck pain along the anterior neck. States it is now tender to palpation. Patient continues to have upper back and shoulder pain as well.states full ROM is intact, \"I just feel inflamed\". Patient is not having any numbness, tingling, shooting burning pain down her arms.    Decreased sensation of lower extremity  Patient continues to have symptoms without change. Patient has not completed EMG at this time.     Numbness and tingling of left side of " "face  Chronic in nature. New problem for this provider. States this started approximately 2 years ago. Patient states that over the last \"little while\" she has noticed numbness, tingling on the left side of her face, covering the entire cheek. Patient states this is intermittent. Patient describes it as numb. States that sometimes she notices the tip of her tongue will also be numb. Patient does not notice anything that makes it better or worse. Patient has not noticed anything that triggers it. Patient states that she has discussed this with rheumatology who does not believe it is related to her psoriatic arthritis. Patient states that numbness does happen when she does not have a flare, states that it does sometimes happen when she feels more stressed.      Patient Active Problem List    Diagnosis Date Noted   • Numbness and tingling of left side of face 09/05/2017   • Decreased sensation of lower extremity 06/30/2017   • Gastroesophageal reflux disease 06/30/2017   • Neck pain 03/13/2017   • Psoriatic arthritis (CMS-[facility]) 02/09/2017   • Left upper quadrant pain 02/09/2017       Allergies:Doxycycline    Current Outpatient Prescriptions   Medication Sig Dispense Refill   • naltrexone (DEPADE) 50 MG Tab Take 1 mg by mouth every day.     • omeprazole (PRILOSEC) 20 MG delayed-release capsule Take 1 Cap by mouth every day. 90 Cap 3   • Omega-3 Fatty Acids (FISH OIL) 1000 MG Cap capsule Take 1,000 mg by mouth 3 times a day, with meals.     • zinc sulfate 1 MG/ML Solution by Intravenous route.     • cyanocobalamin (VITAMIN B-12) 500 MCG Tab Take 500 mcg by mouth every day.     • vitamin D (CHOLECALCIFEROL) 1000 UNIT Tab Take 1,000 Units by mouth every day.       No current facility-administered medications for this visit.        Social History   Substance Use Topics   • Smoking status: Never Smoker   • Smokeless tobacco: Never Used   • Alcohol use Yes       Family Status   Relation Status   • Mother Alive   • Father " "   • Maternal Grandmother    • Maternal Grandfather      Family History   Problem Relation Age of Onset   • Psychiatry Mother      depression   • Arthritis Father      psoriatic   • Hypertension Father    • Psychiatry Father      depression   • Cancer Maternal Grandmother    • Cancer Maternal Grandfather      stomach       Review of Systems:   Constitutional:  Negative for fever, chills, weight loss and malaise/fatigue.   HEENT:  Negative for ear pain, nosebleeds, congestion, sore throat and neck pain.    Eyes:  Negative for blurred vision.   Respiratory:  Negative for cough, sputum production, shortness of breath and wheezing.    Cardiovascular:  Negative for chest pain, palpitations, orthopnea and leg swelling.   Gastrointestinal:Positive for heartburn, negative nausea, vomiting and positive abdominal pain.   Genitourinary:  Negative for dysuria, urgency and frequency.   Musculoskeletal:  Positive for myalgias, back pain and joint pain.   Skin:  Negative for rash and itching.   Neurological:  Negative for dizziness, tingling, tremors, sensory change, focal weakness and headaches.   Endo/Heme/Allergies:  Does not bruise/bleed easily.   Psychiatric/Behavioral:  Negative for depression, suicidal ideas and memory loss.  The patient is not nervous/anxious and does not have insomnia.    All other systems reviewed and are negative except as in HPI.    Exam:  Blood pressure 116/68, pulse 80, temperature 37.1 °C (98.8 °F), resp. rate 16, height 1.626 m (5' 4\"), weight 61.2 kg (135 lb), last menstrual period 2017, SpO2 100 %, not currently breastfeeding.  General:  Normal appearing. No distress.  HEENT:  Normocephalic. Eyes conjunctiva clear lids without ptosis, pupils equal and reactive to light accommodation, ears normal shape and contour, canals are clear bilaterally, tympanic membranes are benign, nasal mucosa benign, oropharynx is without erythema, edema or exudates.   Neck:  Supple without JVD or bruit. " Thyroid is not enlarged. Tenderness to palpation along sternocleidomastoid.  Pulmonary:  Clear to ausculation.  Normal effort. No rales, ronchi, or wheezing.  Cardiovascular:  Regular rate and rhythm without murmur. Carotid and radial pulses are intact and equal bilaterally.  Abdomen:  Soft, nondistended. Normal bowel sounds. Liver and spleen are not palpable. Tenderness to palpation epigastric area, left sternal border/left upper quadrant, tenderness to palpation of lower ribs on the left side and spaces between the ribs.   Neurologic:  Grossly nonfocal. negative findings: speech normal, mental status intact, cranial nerves 2-12 intact, muscle tone normal, muscle strength normal, rapid alternating movements normal, finger to nose normal Alert and oriented x 3. DTRs 2+ and symmetric. Sensation intact in left cheek.  Lymph: No cervical, supraclavicular or axillary lymph nodes are palpable  Skin:  Warm and dry.  No obvious lesions.  Musculoskeletal:  Normal gait. No extremity cyanosis, clubbing, or edema.  Psych:  Normal mood and affect. Alert and oriented x3. Judgment and insight is normal.      PLAN:    1. Numbness and tingling of left side of face  Complete imaging.  Sensation is intact on exam, no signs of grinding, no specific abnormalities.  - MR-BRAIN-W/O; Future  - REFERRAL TO NEUROLOGY    2. Left upper quadrant pain  Labs, ultrasound within normal limits, patient continues to have tenderness.  Patient will complete CT.  - CT-ABDOMEN W/O; Future    3. Neck pain  Imaging as ordered  Ice, over-the-counter medications as needed    4. Psoriatic arthritis (CMS-HCC)  Continue follow-up with dermatology    5. Gastroesophageal reflux disease, esophagitis presence not specified  Continue Prilosec  - omeprazole (PRILOSEC) 20 MG delayed-release capsule; Take 1 Cap by mouth every day.  Dispense: 90 Cap; Refill: 3    6. Decreased sensation of lower extremity  EMG as ordered.    Follow-up as needed. Patient is encouraged to  be seen in the emergency room for chest pain, palpitations, shortness of breath, dizziness, severe abdominal pain or other concerning symptoms. Case is discussed with Dr. Rodriguez who agrees with POC.    Patient was seen for 40 minutes face to face of which, greater than 50% was spent counseling regarding the above mentioned problems.    Please note that this dictation was created using voice recognition software. I have made every reasonable attempt to correct obvious errors, but I expect that there are errors of grammar and possibly content that I did not discover before finalizing the note.    Assessment/Plan:  1. Numbness and tingling of left side of face  MR-BRAIN-W/O    REFERRAL TO NEUROLOGY   2. Left upper quadrant pain  CT-ABDOMEN W/O   3. Neck pain     4. Psoriatic arthritis (CMS-HCC)     5. Gastroesophageal reflux disease, esophagitis presence not specified  omeprazole (PRILOSEC) 20 MG delayed-release capsule   6. Decreased sensation of lower extremity            I have placed the below orders and discussed them with an approved delegating provider. The MA is performing the below orders under the direction of Dr. Frias.

## 2017-09-05 NOTE — ASSESSMENT & PLAN NOTE
"Chronic in nature. Patient continues to follow with Dr. Leon's rheumatology. Patient also also seeing another provider to have hormone levels checked and to complete low- dose naltrexone therapy patient does not provide provider's name or give further details about provider specialty. States she feels \"more inflammation\". Patient has not used ibuprofen since her last visit. Continue follow-up with rheumatology.  "

## 2017-09-05 NOTE — LETTER
[facility]  JOSE SpearsRDOLLY  [address]  [place] [address]  Fax: [phone]   Authorization for Release/Disclosure of   Protected Health Information   Name: NUBIA ARCE : 1980 SSN: [id number]   Address: [address] [place] [address].   [place] [address] Phone:    [phone] (home)    I authorize the entity listed below to release/disclose the PHI below to:   [facility]/NIKHIL SpearsPRacheleRDOLLY and ETTA Spears   Provider or Entity Name:  Dr.Samuel Chowdhury    Address   [facility]   Phone:      Fax:     Reason for request: continuity of care   Information to be released:    [  ] LAST COLONOSCOPY,  including any PATH REPORT and follow-up  [  ] LAST FIT/COLOGUARD RESULT [  ] LAST DEXA  [  ] LAST MAMMOGRAM  [X  ] LAST PAP  [  ] LAST LABS [  ] RETINA EXAM REPORT  [  ] IMMUNIZATION RECORDS  [  ] Release all info      [  ] Check here and initial the line next to each item to release ALL health information INCLUDING  _____ Care and treatment for drug and / or alcohol abuse  _____ HIV testing, infection status, or AIDS  _____ Genetic Testing    DATES OF SERVICE OR TIME PERIOD TO BE DISCLOSED: _____________  I understand and acknowledge that:  * This Authorization may be revoked at any time by you in writing, except if your health information has already been used or disclosed.  * Your health information that will be used or disclosed as a result of you signing this authorization could be re-disclosed by the recipient. If this occurs, your re-disclosed health information may no longer be protected by State or Federal laws.  * You may refuse to sign this Authorization. Your refusal will not affect your ability to obtain treatment.  * This Authorization becomes effective upon signing and will  on (date) __________.      If no date is indicated, this Authorization will  one (1) year from the signature date.    Name: Nubia Arce    Signature:   Date:     9/5/2017       PLEASE FAX REQUESTED RECORDS BACK TO: [phone]

## 2017-09-07 ENCOUNTER — SUPERVISING PHYSICIAN REVIEW (OUTPATIENT)
Dept: MEDICAL GROUP | Facility: PHYSICIAN GROUP | Age: 37
End: 2017-09-07

## 2017-09-08 NOTE — PROGRESS NOTES
I have reviewed and agree with history, assessment and plan for office encounter on 9/5/17 with CLARISSA Livingston  Face to face encounter/direct observation: No  Suggested changes to plan or follow-up: none   Maisha Frias M.D.

## 2017-09-19 ENCOUNTER — TELEPHONE (OUTPATIENT)
Dept: MEDICAL GROUP | Facility: PHYSICIAN GROUP | Age: 37
End: 2017-09-19

## 2017-09-19 ENCOUNTER — NON-PROVIDER VISIT (OUTPATIENT)
Dept: NEUROLOGY | Facility: MEDICAL CENTER | Age: 37
End: 2017-09-19
Payer: COMMERCIAL

## 2017-09-19 DIAGNOSIS — R20.8 DECREASED SENSATION OF LOWER EXTREMITY: ICD-10-CM

## 2017-09-19 PROCEDURE — 95886 MUSC TEST DONE W/N TEST COMP: CPT | Performed by: SPECIALIST

## 2017-09-19 PROCEDURE — 95907 NVR CNDJ TST 1-2 STUDIES: CPT | Performed by: SPECIALIST

## 2017-09-19 NOTE — TELEPHONE ENCOUNTER
Lab called and wants to know if patient CT is with or without contrast. Lab recommends with . Patient has appointment  09/20/2017. Lab ext [phone]. LM

## 2017-09-19 NOTE — PROCEDURES
DATE OF SERVICE:  2017    ORDERING PHYSICIAN:  CLARISSA Livingston    Please refer to waveforms stored in the computer for absolute latencies and   waveforms.    SUMMARY:  Nerve conduction studies of the right lower extremity revealed the   followin.  Right common peroneal motor distal latency is within normal limits.    Amplitude is normal at 6.4 mV.  Conduction velocity is within normal limits.  2.  Right tibial motor distal latency is within normal limits.  Amplitude is   normal at 12.4 msec.  Conduction velocity is within normal limits.    Needle examination of selected muscles studied of the right lower extremity   reveals no evidence of acute or chronic denervation changes.  Muscles studied   were right extensor hallucis longus, tibialis anterior, medial gastrocnemius,   lateral gastrocnemius and quadriceps femoris.    IMPRESSION:  1.  Normal EMG and nerve conduction study of right lower extremity.  2.  Discrepancy and amplitudes of the peroneal motor and tibial motor   responses consistent with the patient's clinical findings of peroneal   distribution sensory loss on the right.  Clinical correlation is suggested.       ____________________________________     G MD MAJOR BOWSER / TORITO    DD:  2017 13:27:11  DT:  2017 13:42:11    D#:  [id number]  Job#:  [id number]

## 2017-09-20 ENCOUNTER — HOSPITAL ENCOUNTER (OUTPATIENT)
Dept: RADIOLOGY | Facility: MEDICAL CENTER | Age: 37
End: 2017-09-20
Attending: NURSE PRACTITIONER
Payer: COMMERCIAL

## 2017-09-20 DIAGNOSIS — R10.12 LEFT UPPER QUADRANT PAIN: ICD-10-CM

## 2017-09-20 DIAGNOSIS — R20.0 NUMBNESS AND TINGLING OF LEFT SIDE OF FACE: ICD-10-CM

## 2017-09-20 DIAGNOSIS — R20.2 NUMBNESS AND TINGLING OF LEFT SIDE OF FACE: ICD-10-CM

## 2017-09-20 PROCEDURE — 74160 CT ABDOMEN W/CONTRAST: CPT

## 2017-09-20 PROCEDURE — 700117 HCHG RX CONTRAST REV CODE 255: Performed by: NURSE PRACTITIONER

## 2017-09-20 PROCEDURE — 70551 MRI BRAIN STEM W/O DYE: CPT

## 2017-09-20 RX ADMIN — IOHEXOL 50 ML: 240 INJECTION, SOLUTION INTRATHECAL; INTRAVASCULAR; INTRAVENOUS; ORAL at 12:52

## 2017-09-20 RX ADMIN — IOHEXOL 100 ML: 350 INJECTION, SOLUTION INTRAVENOUS at 12:52

## 2017-09-21 ENCOUNTER — TELEPHONE (OUTPATIENT)
Dept: MEDICAL GROUP | Facility: PHYSICIAN GROUP | Age: 37
End: 2017-09-21

## 2017-09-21 DIAGNOSIS — E34.8 PINEAL GLAND CYST: ICD-10-CM

## 2017-09-21 NOTE — TELEPHONE ENCOUNTER
----- Message from ETTA Spears sent at 9/21/2017 12:35 PM PDT -----  Please call pt and give lab results: CT of abdomen is within normal limits there are no concerning findings aside for constipation.

## 2017-09-21 NOTE — TELEPHONE ENCOUNTER
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 I am referring you to [facility] neurosurgery group.

## 2017-09-21 NOTE — TELEPHONE ENCOUNTER
Called and spoke with patient regarding results. Patient wants to know results on other two exams done. MATI

## 2017-09-21 NOTE — TELEPHONE ENCOUNTER
Called and spoke with patient regarding results. Patient would like to know were this cyst is at. Patient said she has a lump on the L side of her neck and wants to know if that's the cyst. Patient started crying over the phone.Patient would like a call back from Kamar. MATI

## 2017-11-28 ENCOUNTER — HOSPITAL ENCOUNTER (OUTPATIENT)
Dept: LAB | Facility: MEDICAL CENTER | Age: 37
End: 2017-11-28
Attending: FAMILY MEDICINE
Payer: COMMERCIAL

## 2017-11-28 LAB
DHEA-S SERPL-MCNC: 150.7 UG/DL (ref 60.9–337)
ESTRADIOL SERPL-MCNC: 425 PG/ML
PROGEST SERPL-MCNC: 0.49 NG/ML

## 2017-11-28 PROCEDURE — 82627 DEHYDROEPIANDROSTERONE: CPT

## 2017-11-28 PROCEDURE — 36415 COLL VENOUS BLD VENIPUNCTURE: CPT

## 2017-11-28 PROCEDURE — 84403 ASSAY OF TOTAL TESTOSTERONE: CPT

## 2017-11-28 PROCEDURE — 84144 ASSAY OF PROGESTERONE: CPT

## 2017-11-28 PROCEDURE — 84305 ASSAY OF SOMATOMEDIN: CPT

## 2017-11-28 PROCEDURE — 84270 ASSAY OF SEX HORMONE GLOBUL: CPT

## 2017-11-28 PROCEDURE — 82670 ASSAY OF TOTAL ESTRADIOL: CPT

## 2017-12-01 LAB
SHBG SERPL-SCNC: 197 NMOL/L (ref 30–135)
TESTOST FREE SERPL-MCNC: 2.1 PG/ML (ref 1.3–9.2)
TESTOST SERPL-MCNC: 46 NG/DL (ref 9–55)

## 2017-12-06 LAB — IGF BP1 SERPL-MCNC: 8 NG/ML (ref 5–34)

## 2017-12-08 ENCOUNTER — HOSPITAL ENCOUNTER (OUTPATIENT)
Dept: LAB | Facility: MEDICAL CENTER | Age: 37
End: 2017-12-08
Attending: FAMILY MEDICINE
Payer: COMMERCIAL

## 2017-12-08 LAB
ESTRADIOL SERPL-MCNC: 279 PG/ML
PROGEST SERPL-MCNC: 24.59 NG/ML

## 2017-12-08 PROCEDURE — 84403 ASSAY OF TOTAL TESTOSTERONE: CPT

## 2017-12-08 PROCEDURE — 84270 ASSAY OF SEX HORMONE GLOBUL: CPT

## 2017-12-08 PROCEDURE — 84144 ASSAY OF PROGESTERONE: CPT

## 2017-12-08 PROCEDURE — 36415 COLL VENOUS BLD VENIPUNCTURE: CPT

## 2017-12-08 PROCEDURE — 82670 ASSAY OF TOTAL ESTRADIOL: CPT

## 2017-12-12 LAB
SHBG SERPL-SCNC: 225 NMOL/L (ref 30–135)
TESTOST FREE SERPL-MCNC: 1.7 PG/ML (ref 1.3–9.2)
TESTOST SERPL-MCNC: 42 NG/DL (ref 9–55)

## 2017-12-21 ENCOUNTER — APPOINTMENT (OUTPATIENT)
Dept: RADIOLOGY | Facility: MEDICAL CENTER | Age: 37
End: 2017-12-21
Attending: NURSE PRACTITIONER
Payer: COMMERCIAL

## 2017-12-21 DIAGNOSIS — M54.16 LUMBAR RADICULOPATHY: ICD-10-CM

## 2017-12-21 DIAGNOSIS — M54.12 CERVICAL RADICULOPATHY: ICD-10-CM

## 2017-12-21 PROCEDURE — 72141 MRI NECK SPINE W/O DYE: CPT

## 2017-12-21 PROCEDURE — 72148 MRI LUMBAR SPINE W/O DYE: CPT

## 2017-12-26 ENCOUNTER — HOSPITAL ENCOUNTER (OUTPATIENT)
Dept: RADIOLOGY | Facility: MEDICAL CENTER | Age: 37
End: 2017-12-26
Attending: NURSE PRACTITIONER
Payer: COMMERCIAL

## 2017-12-26 DIAGNOSIS — M54.12 RADICULOPATHY, CERVICAL: ICD-10-CM

## 2017-12-26 DIAGNOSIS — M54.16 LUMBAR RADICULOPATHY: ICD-10-CM

## 2017-12-26 PROCEDURE — 72110 X-RAY EXAM L-2 SPINE 4/>VWS: CPT

## 2017-12-26 PROCEDURE — 72050 X-RAY EXAM NECK SPINE 4/5VWS: CPT

## 2017-12-29 ENCOUNTER — OFFICE VISIT (OUTPATIENT)
Dept: MEDICAL GROUP | Facility: PHYSICIAN GROUP | Age: 37
End: 2017-12-29
Payer: COMMERCIAL

## 2017-12-29 VITALS
WEIGHT: 134 LBS | RESPIRATION RATE: 16 BRPM | DIASTOLIC BLOOD PRESSURE: 64 MMHG | HEIGHT: 64 IN | TEMPERATURE: 98 F | SYSTOLIC BLOOD PRESSURE: 116 MMHG | OXYGEN SATURATION: 100 % | HEART RATE: 81 BPM | BODY MASS INDEX: 22.88 KG/M2

## 2017-12-29 DIAGNOSIS — R20.8 DECREASED SENSATION OF LOWER EXTREMITY: ICD-10-CM

## 2017-12-29 DIAGNOSIS — F33.8 SEASONAL DEPRESSION (HCC): ICD-10-CM

## 2017-12-29 DIAGNOSIS — R42 DIZZINESS: ICD-10-CM

## 2017-12-29 DIAGNOSIS — L40.50 PSORIATIC ARTHRITIS (HCC): ICD-10-CM

## 2017-12-29 PROCEDURE — 99214 OFFICE O/P EST MOD 30 MIN: CPT | Performed by: NURSE PRACTITIONER

## 2017-12-29 ASSESSMENT — PAIN SCALES - GENERAL: PAINLEVEL: NO PAIN

## 2017-12-29 NOTE — PROGRESS NOTES
"Chief Complaint   Patient presents with   • Medication Refill     Zoloft    • Weakness     x 3 months        HISTORY OF PRESENT ILLNESS: Patient is a 37 y.o. female established patient who presents today to for breast exam and to discuss new symptoms.    Dizziness  States she has been feeling \"off lately\". States that she is taking a lot of supplements and low dose naltrexone. States that she has a lightheaded/dizzy/nausea feeling states it is worst when she forgets to eat. States she will feel like she has low HR at times, but sometimes feels like her heart is racing. Patient states that her elevated heart rate is more often when she is anxious, but that she has had it happen when she is at rest. When HR feels slower it feels like she is SOB. She says she will also feel significant fatigue. Lasts 3 hours. Patient states that one day she did skip her naltrexone dose and states that that day she felt much better. Patient states the episodes are intermittent and difficult to predict. 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 Patient states that when she has episodes nothing makes it better or worse.    Psoriatic arthritis (CMS-HCC)  Chronic in nature. Stable. Patient continues to follow with Dr. Martinez. Discussed the patient continued follow-up with Dr. Martinez.    Patient continues seeing Dr. Montiel, a gynecologist, regarding what she states are elevated estrogen level. Patient states that a lot of the symptoms she is complaining of today started when she started taking low-dose naltrexone states that she is also taking multiple supplements to this provider including a supplement taken by body builders, patient states she does not have a list of supplements discussed with patient that if she provides a list of supplements I will try and look into whether or not these could be causing " her symptoms. Discussed with patient that naltrexone can cause fatigue, depression, dizziness.     Decreased sensation of lower extremity  Symptoms continue without change. Patient is seeing neurosurgery regarding this issue, they will continue follow-up with this.    Seasonal depression (CMS-HCC)  [place] in nature. Worsening. Patient states that symptoms generally start in November states that she feels increasingly fatigued, sad, irritable states that she has increasing anxiety during the winter months. Patient states she will have racing thoughts and inability to shut off her thoughts. Patient states that she will also have less enjoyment of regular activities states that she will avoid social situations.      Patient Active Problem List    Diagnosis Date Noted   • Dizziness 12/29/2017   • Seasonal depression (CMS-HCC) 12/29/2017   • Numbness and tingling of left side of face 09/05/2017   • Decreased sensation of lower extremity 06/30/2017   • Gastroesophageal reflux disease 06/30/2017   • Neck pain 03/13/2017   • Psoriatic arthritis (CMS-HCC) 02/09/2017   • Left upper quadrant pain 02/09/2017       Allergies:Doxycycline    Current Outpatient Prescriptions   Medication Sig Dispense Refill   • sertraline (ZOLOFT) 50 MG Tab Take 1 Tab by mouth every day. 90 Tab 0   • omeprazole (PRILOSEC) 20 MG delayed-release capsule Take 1 Cap by mouth every day. 90 Cap 3   • Omega-3 Fatty Acids (FISH OIL) 1000 MG Cap capsule Take 1,000 mg by mouth 3 times a day, with meals.     • zinc sulfate 1 MG/ML Solution by Intravenous route.     • cyanocobalamin (VITAMIN B-12) 500 MCG Tab Take 500 mcg by mouth every day.     • vitamin D (CHOLECALCIFEROL) 1000 UNIT Tab Take 1,000 Units by mouth every day.     • NALTREXONE HCL PO Take 1 mg by mouth every day.  2     No current facility-administered medications for this visit.        Social History   Substance Use Topics   • Smoking status: Never Smoker   • Smokeless tobacco: Never Used   •  "Alcohol use Yes       Family Status   Relation Status   • Mother Alive   • Father    • Maternal Grandmother    • Maternal Grandfather      Family History   Problem Relation Age of Onset   • Psychiatry Mother      depression   • Arthritis Father      psoriatic   • Hypertension Father    • Psychiatry Father      depression   • Cancer Maternal Grandmother    • Cancer Maternal Grandfather      stomach       Review of Systems:   Constitutional:  Negative for fever, chills, weight loss and malaise/fatigue.   HEENT:  Negative for ear pain, nosebleeds, congestion, sore throat and neck pain.    Eyes:  Negative for blurred vision.   Respiratory:  Negative for cough, sputum production, shortness of breath and wheezing.    Cardiovascular:  Negative for chest pain, palpitations, orthopnea and leg swelling.   Gastrointestinal:  Negative for heartburn, nausea, vomiting and abdominal pain.   Genitourinary:  Negative for dysuria, urgency and frequency.   Musculoskeletal:  Negative for myalgias, back pain and joint pain.   Skin:  Negative for rash and itching.   Neurological:  Negative for dizziness, tingling, tremors, sensory change, focal weakness and headaches.   Endo/Heme/Allergies:  Does not bruise/bleed easily.   Psychiatric/Behavioral:  Negative for depression, suicidal ideas and memory loss.  The patient is not nervous/anxious and does not have insomnia.    All other systems reviewed and are negative except as in HPI.    Exam:  Blood pressure 116/64, pulse 81, temperature 36.7 °C (98 °F), resp. rate 16, height 1.626 m (5' 4\"), weight 60.8 kg (134 lb), last menstrual period 12/15/2017, SpO2 100 %, not currently breastfeeding.  General:  Normal appearing. No distress.  HEENT:  Normocephalic. Eyes conjunctiva clear lids without ptosis, pupils equal and reactive to light accommodation, ears normal shape and contour, canals are clear bilaterally, tympanic membranes are benign, nasal mucosa benign, oropharynx is without " erythema, edema or exudates.   Pulmonary:  Clear to ausculation.  Normal effort. No rales, ronchi, or wheezing.  Cardiovascular:  Regular rate and rhythm without murmur. Carotid and radial pulses are intact and equal bilaterally.  Neurologic:  Grossly nonfocal  Skin:  Warm and dry.  No obvious lesions.  Musculoskeletal:  Normal gait. No extremity cyanosis, clubbing, or edema.  Psych:  Normal mood and affect. Alert and oriented x3. Judgment and insight is normal.  BREAST EXAM: Breasts examined seated and supine. No skin changes, peau d'orange or nipple retraction. No discharge. No axillary or supraclavicular adenopathy. No masses or nodularity palpable.       PLAN:    1. Seasonal depression (CMS-[facility])  Sertraline is refilled for seasonal depressive symptoms at this time. Consultation regarding risks, benefits, side effects of the medication.  - sertraline (ZOLOFT) 50 MG Tab; Take 1 Tab by mouth every day.  Dispense: 90 Tab; Refill: 0    2. Dizziness  Patient describes dizziness as more of a lightheaded feeling accompanied by fatigue. 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 Townsend on 17th. Discussed with patient plan of stopping all supplements and medications at this time to see if this resolves her symptoms. Patient will follow-up if symptoms worsen, in one month to discuss need for further workup. Blood pressure is 116/64, patient states she has not been checking her blood pressure at home has not noticed high or low blood pressures states that she has felt like she has had some racing or slower heart rates currently is within normal limits at this time. Cardiac exam is within normal limits. She is not having any chest pain or other concerning symptoms.  Consider cardiac workup if symptoms to not resolve with cessation of supplements and naltrexone. She is encouraged to bring a full list of supplements she is taking so that we may review them.    3. Psoriatic arthritis (CMS-HCC)  Continue follow-up with Dr. Martinez.    4. Decreased sensation of lower extremity  Continue follow-up with neurosurgery.    Follow-up in one month long. Patient is encouraged to be seen in the emergency room for chest pain, palpitations, shortness of breath, dizziness, severe abdominal pain or other concerning symptoms.    Please note that this dictation was created using voice recognition software. I have made every reasonable attempt to correct obvious errors, but I expect that there are errors of grammar and possibly content that I did not discover before finalizing the note.    Assessment/Plan:  1. Seasonal depression (CMS-HCC)  sertraline (ZOLOFT) 50 MG Tab   2. Dizziness     3. Psoriatic arthritis (CMS-[facility])     4. Decreased sensation of lower extremity            I have placed the below orders and discussed them with an approved delegating provider. The MA is performing the below orders under the direction of Dr. Rodriguez.

## 2017-12-29 NOTE — ASSESSMENT & PLAN NOTE
[place] in nature. Worsening. Patient states that symptoms generally start in November states that she feels increasingly fatigued, sad, irritable states that she has increasing anxiety during the winter months. Patient states she will have racing thoughts and inability to shut off her thoughts. Patient states that she will also have less enjoyment of regular activities states that she will avoid social situations.

## 2017-12-29 NOTE — ASSESSMENT & PLAN NOTE
Symptoms continue without change. Patient is seeing neurosurgery regarding this issue, they will continue follow-up with this.

## 2017-12-29 NOTE — ASSESSMENT & PLAN NOTE
"States she has been feeling \"off lately\". States that she is taking a lot of supplements and low dose naltrexone. States that she has a lightheaded/dizzy/nausea feeling states it is worst when she forgets to eat. States she will feel like she has low HR at times, but sometimes feels like her heart is racing. Patient states that her elevated heart rate is more often when she is anxious, but that she has had it happen when she is at rest. When HR feels slower it feels like she is SOB. She says she will also feel significant fatigue. Lasts 3 hours. Patient states that one day she did skip her naltrexone dose and states that that day she felt much better. Patient states the episodes are intermittent and difficult to predict. 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 Patient states that when she has episodes nothing makes it better or worse.  "

## 2018-01-23 ENCOUNTER — OFFICE VISIT (OUTPATIENT)
Dept: NEUROLOGY | Facility: MEDICAL CENTER | Age: 38
End: 2018-01-23
Payer: COMMERCIAL

## 2018-01-23 VITALS
SYSTOLIC BLOOD PRESSURE: 118 MMHG | WEIGHT: 136 LBS | RESPIRATION RATE: 14 BRPM | HEART RATE: 78 BPM | HEIGHT: 64 IN | BODY MASS INDEX: 23.22 KG/M2 | TEMPERATURE: 97.7 F | DIASTOLIC BLOOD PRESSURE: 60 MMHG | OXYGEN SATURATION: 100 %

## 2018-01-23 DIAGNOSIS — R90.89 ABNORMAL BRAIN MRI: ICD-10-CM

## 2018-01-23 DIAGNOSIS — R20.8 DECREASED SENSATION OF LOWER EXTREMITY: ICD-10-CM

## 2018-01-23 DIAGNOSIS — E34.8 PINEAL GLAND CYST: ICD-10-CM

## 2018-01-23 DIAGNOSIS — F33.8 SEASONAL DEPRESSION (HCC): ICD-10-CM

## 2018-01-23 DIAGNOSIS — R20.8 DYSESTHESIA: ICD-10-CM

## 2018-01-23 PROCEDURE — 99215 OFFICE O/P EST HI 40 MIN: CPT | Performed by: PSYCHIATRY & NEUROLOGY

## 2018-01-23 RX ORDER — SERTRALINE HYDROCHLORIDE 25 MG/1
25 TABLET, FILM COATED ORAL DAILY
COMMUNITY
End: 2018-11-07 | Stop reason: SDUPTHER

## 2018-01-23 ASSESSMENT — PATIENT HEALTH QUESTIONNAIRE - PHQ9
SUM OF ALL RESPONSES TO PHQ QUESTIONS 1-9: 4
5. POOR APPETITE OR OVEREATING: 0 - NOT AT ALL
CLINICAL INTERPRETATION OF PHQ2 SCORE: 2
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
## 2018-01-24 NOTE — ASSESSMENT & PLAN NOTE
Today I reviewed Ms. Arce's brain MRI imaging from September 20, 2017. I was able to appreciate the pineal gland cyst, which is of a modest size. She may experience some symptoms from this, but I advised her that at this point the risk of the surgery is not worth the potential benefit that she would get. I also noted a T2 and flare hyperintensity that on the coronal sections appears to occupy a space between the cerebellum and the hemispheres, almost as if it is within the tentorium. At 1st I thought this may represent a demyelinating lesion in the middle cerebellar peduncle, but it is poorly visualized. I discussed with her potentially repeating her brain MRI with and without contrast but on the T3 magnet. I also do not have thoracic spinal cord imaging which I would like to also evaluate for any evidence of demyelinating disease. There was nothing observed in the cervical spinal cord, and other than some very mild disc disease, nothing visible in the lumbar spine. At this time I cannot make a diagnosis of multiple sclerosis. I discussed with her that if the other imaging is unrevealing, we may wish to do a lumbar puncture. We will go stepwise however.    Plan:  1. Repeat brain MRI with and without contrast on the 3 T magnet  2. Thoracic spinal cord MRI with and without contrast  3. Is unrevealing, we'll opt to do a spinal tap

## 2018-01-24 NOTE — ASSESSMENT & PLAN NOTE
Miss Arce suffers from depression that worsens in the winter months, for which she is currently on Zoloft 25 mg once a day. She was extremely tearful in the exam room today, and expresses that she has had worsening of her symptoms recently. She denies any suicidal ideations or thoughts, and has a follow-up appointment with her psychologist coming up this week. I advised her to increase her Zoloft dose to 50 mg at least temporarily to potentially help with her symptoms.    Plan:  1. Increase Zoloft to 50 mg
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
## 2018-01-25 ENCOUNTER — TELEPHONE (OUTPATIENT)
Dept: NEUROLOGY | Facility: MEDICAL CENTER | Age: 38
End: 2018-01-25

## 2018-01-25 NOTE — TELEPHONE ENCOUNTER
Pt called stated that she has her MRI's scheduled for 02/21/18 but would like to get these done sooner and was wanting to know if she can get the MRI's done in [place], [place] sooner than 02/21 and if they have the specific/ special machine for her MRI's? Please advise. Thank you.

## 2018-02-01 ENCOUNTER — OFFICE VISIT (OUTPATIENT)
Dept: MEDICAL GROUP | Facility: PHYSICIAN GROUP | Age: 38
End: 2018-02-01
Payer: COMMERCIAL

## 2018-02-01 VITALS
SYSTOLIC BLOOD PRESSURE: 126 MMHG | HEIGHT: 64 IN | HEART RATE: 68 BPM | DIASTOLIC BLOOD PRESSURE: 72 MMHG | RESPIRATION RATE: 16 BRPM | WEIGHT: 136 LBS | OXYGEN SATURATION: 98 % | BODY MASS INDEX: 23.22 KG/M2 | TEMPERATURE: 98.7 F

## 2018-02-01 DIAGNOSIS — L40.50 PSORIATIC ARTHRITIS (HCC): ICD-10-CM

## 2018-02-01 DIAGNOSIS — R59.1 LYMPHADENOPATHY OF HEAD AND NECK: ICD-10-CM

## 2018-02-01 DIAGNOSIS — M54.2 NECK PAIN: ICD-10-CM

## 2018-02-01 PROCEDURE — 99214 OFFICE O/P EST MOD 30 MIN: CPT | Performed by: NURSE PRACTITIONER

## 2018-02-01 ASSESSMENT — PAIN SCALES - GENERAL: PAINLEVEL: 4=SLIGHT-MODERATE PAIN

## 2018-02-01 NOTE — LETTER
[facility]  NIKHIL BarbosaP.RDOLLY  [address]  [place] [address]  Fax: [phone]   Authorization for Release/Disclosure of   Protected Health Information   Name: NUBIA ARCE : 1980 SSN: [id number]   Address: [address] [place] [address].   [place] [address] Phone:    [phone] (home)    I authorize the entity listed below to release/disclose the PHI below to:   [facility]/VINH Barbosa.P.R.GILMA and ETTA Barbosa   Provider or Entity Name:  [facility] Address   City, State, [facility]   Phone:      Fax:     Reason for request: continuity of care   Information to be released:    [  ] LAST COLONOSCOPY,  including any PATH REPORT and follow-up  [  ] LAST FIT/COLOGUARD RESULT [  ] LAST DEXA  [  ] LAST MAMMOGRAM  [  ] LAST PAP  [X  ] LAST LABS [  ] RETINA EXAM REPORT  [  ] IMMUNIZATION RECORDS  [  ] Release all info      [  ] Check here and initial the line next to each item to release ALL health information INCLUDING  _____ Care and treatment for drug and / or alcohol abuse  _____ HIV testing, infection status, or AIDS  _____ Genetic Testing    DATES OF SERVICE OR TIME PERIOD TO BE DISCLOSED: _____________  I understand and acknowledge that:  * This Authorization may be revoked at any time by you in writing, except if your health information has already been used or disclosed.  * Your health information that will be used or disclosed as a result of you signing this authorization could be re-disclosed by the recipient. If this occurs, your re-disclosed health information may no longer be protected by State or Federal laws.  * You may refuse to sign this Authorization. Your refusal will not affect your ability to obtain treatment.  * This Authorization becomes effective upon signing and will  on (date) __________.      If no date is indicated, this Authorization will  one (1) year from the signature date.    Name: Nubia Arce    Signature:   Date:        2/1/2018       PLEASE FAX REQUESTED RECORDS BACK TO: [phone]

## 2018-02-02 ENCOUNTER — TELEPHONE (OUTPATIENT)
Dept: NEUROLOGY | Facility: MEDICAL CENTER | Age: 38
End: 2018-02-02

## 2018-02-02 NOTE — ASSESSMENT & PLAN NOTE
Chronic in nature. Patient continues to have neck pain along her anterior neck. States it is tender to palpation. Patient has upper back and shoulder pain as well. States that she just feels inflamed. Patient is not having any numbness, tingling, shooting pain down her arms. Range of motion is intact muscles are tense, patient does note increased anxiety recently.

## 2018-02-02 NOTE — ASSESSMENT & PLAN NOTE
"This is a new problem. Started 3 days ago. States that she has noticed swollen tonsillar lymph nodes, as well as a lymph node on the back of her neck. States that she does have a \"large zit\" on her back. In conjunction with this she has been noticing some neck pain on the left side, states she believes this is muscle related. States that since she stopped the other medications she was taking this has worsened. States that lymph nodes are not painful. They continue to be mobile. She states that she has had swollen lymph nodes intermittently in the past. States that they generally resolve after a couple of weeks.  "

## 2018-02-02 NOTE — PROGRESS NOTES
"Chief Complaint   Patient presents with   • Follow-Up     Weakness feeling better    • Muscle Pain     L Side of neck swollen glands        HISTORY OF PRESENT ILLNESS: Patient is a 37 y.o. female established patient who presents today to discuss neck pain and lymphadenopathy.    Lymphadenopathy of head and neck  This is a new problem. Started 3 days ago. States that she has noticed swollen tonsillar lymph nodes, as well as a lymph node on the back of her neck. States that she does have a \"large zit\" on her back. In conjunction with this she has been noticing some neck pain on the left side, states she believes this is muscle related. States that since she stopped the other medications she was taking this has worsened. States that lymph nodes are not painful. They continue to be mobile. She states that she has had swollen lymph nodes intermittently in the past. States that they generally resolve after a couple of weeks.    Psoriatic arthritis (CMS-HCC)  Chronic in nature. Stable. She continues to follow with Dr. Martinez.     Neck pain  Chronic in nature. Patient continues to have neck pain along her anterior neck. States it is tender to palpation. Patient has upper back and shoulder pain as well. States that she just feels inflamed. Patient is not having any numbness, tingling, shooting pain down her arms. Range of motion is intact muscles are tense, patient does note increased anxiety recently.      Patient Active Problem List    Diagnosis Date Noted   • Lymphadenopathy of head and neck 02/01/2018   • Abnormal brain MRI 01/23/2018   • Pineal gland cyst 01/23/2018   • Dizziness 12/29/2017   • Seasonal depression (CMS-HCC) 12/29/2017   • Numbness and tingling of left side of face 09/05/2017   • Decreased sensation of lower extremity 06/30/2017   • Gastroesophageal reflux disease 06/30/2017   • Neck pain 03/13/2017   • Psoriatic arthritis (CMS-HCC) 02/09/2017   • Left upper quadrant pain 02/09/2017 "       Allergies:Doxycycline    Current Outpatient Prescriptions   Medication Sig Dispense Refill   • NALTREXONE HCL PO Take 1 mg by mouth every day.  2   • sertraline (ZOLOFT) 50 MG Tab Take 1 Tab by mouth every day. 90 Tab 0   • Omega-3 Fatty Acids (FISH OIL) 1000 MG Cap capsule Take 1,000 mg by mouth 3 times a day, with meals.     • sertraline (ZOLOFT) 25 MG tablet Take 25 mg by mouth every day.     • omeprazole (PRILOSEC) 20 MG delayed-release capsule Take 1 Cap by mouth every day. 90 Cap 3   • zinc sulfate 1 MG/ML Solution by Intravenous route.     • cyanocobalamin (VITAMIN B-12) 500 MCG Tab Take 500 mcg by mouth every day.     • vitamin D (CHOLECALCIFEROL) 1000 UNIT Tab Take 1,000 Units by mouth every day.       No current facility-administered medications for this visit.        Social History   Substance Use Topics   • Smoking status: Never Smoker   • Smokeless tobacco: Never Used   • Alcohol use Yes      Comment: Socially       Family Status   Relation Status   • Mother Alive   • Father    • Maternal Grandmother    • Maternal Grandfather    • Brother Alive   • Son Alive     Family History   Problem Relation Age of Onset   • Psychiatry Mother      depression   • Hypertension Mother    • Arthritis Father      psoriatic   • Hypertension Father    • Psychiatry Father      depression   • Cancer Maternal Grandmother    • Cancer Maternal Grandfather      stomach   • Other Son      speech apraxia       Review of Systems:   Constitutional:  Negative for fever, chills, weight loss and malaise/fatigue.   HEENT:  Negative for ear pain, nosebleeds, congestion, sore throat and positive neck pain.    Eyes:  Negative for blurred vision.   Respiratory:  Negative for cough, sputum production, shortness of breath and wheezing.    Cardiovascular:  Negative for chest pain, palpitations, orthopnea and leg swelling.   Gastrointestinal:  Negative for heartburn, nausea, vomiting and abdominal pain.   Genitourinary:  Negative  "for dysuria, urgency and frequency.   Musculoskeletal:  Negative for myalgias, back pain and joint pain.   Skin:  Negative for rash and itching.   Neurological:  Negative for dizziness, tingling, tremors, sensory change, focal weakness and headaches.   Endo/Heme/Allergies:  Does not bruise/bleed easily.   Psychiatric/Behavioral:  Negative for depression, suicidal ideas and memory loss.  The patient is not nervous/anxious and does not have insomnia.    All other systems reviewed and are negative except as in HPI.    Exam:  Blood pressure 126/72, pulse 68, temperature 37.1 °C (98.7 °F), resp. rate 16, height 1.626 m (5' 4\"), weight 61.7 kg (136 lb), last menstrual period 01/11/2018, SpO2 98 %, not currently breastfeeding.  General:  Normal appearing. No distress.  HEENT:  Normocephalic. Eyes conjunctiva clear lids without ptosis, pupils equal and reactive to light accommodation, ears normal shape and contour, canals are clear bilaterally, tympanic membranes are benign, nasal mucosa benign, oropharynx is without erythema, edema or exudates.   Pulmonary:  Clear to ausculation.  Normal effort. No rales, ronchi, or wheezing.  Cardiovascular:  Regular rate and rhythm without murmur. Carotid and radial pulses are intact and equal bilaterally.  Neurologic:  Grossly nonfocal  Lymph: Positive tonsillar lymphadenopathy as well a mildly inflamed less than 1cm posterior cervical chain node at the base of her left neck.  Skin:  Warm and dry.  No obvious lesions.  Musculoskeletal:  Normal gait. No extremity cyanosis, clubbing, or edema. Tenderness to palpation of muscles along patient's left neck, muscle tension is noted as well as spasm in paraspinous muscles  Psych:  Normal mood and affect. Alert and oriented x3. Judgment and insight is normal.      PLAN:    1. Lymphadenopathy of head and neck  Patient will update labs.  Discussed with patient that lymphadenopathy can sometimes take several weeks to resolve after an infection. "   Plan to monitor  - CBC WITH DIFFERENTIAL; Future  - COMP METABOLIC PANEL; Future    2. Psoriatic arthritis (CMS-HCC)  Continue follow-up with Dr. Martinez.    3. Neck pain  Consulted patient regarding options for treatment of neck pain patient declines at this time.    Follow-up in 6 weeks or sooner as needed. Patient is encouraged to be seen in the emergency room for chest pain, palpitations, shortness of breath, dizziness, severe abdominal pain or other concerning symptoms.      Please note that this dictation was created using voice recognition software. I have made every reasonable attempt to correct obvious errors, but I expect that there are errors of grammar and possibly content that I did not discover before finalizing the note.    Assessment/Plan:  1. Lymphadenopathy of head and neck  CBC WITH DIFFERENTIAL    COMP METABOLIC PANEL   2. Psoriatic arthritis (CMS-HCC)     3. Neck pain            I have placed the below orders and discussed them with an approved delegating provider. The MA is performing the below orders under the direction of Dr. Frias.

## 2018-02-07 NOTE — TELEPHONE ENCOUNTER
Spoke with pt regarding her MRI's, pt stated she had gotten them done last Friday 02/02/18 and will bring the burned CD of the images to her next appt with Dr Salinas.

## 2018-02-08 ENCOUNTER — OFFICE VISIT (OUTPATIENT)
Dept: NEUROLOGY | Facility: MEDICAL CENTER | Age: 38
End: 2018-02-08
Payer: COMMERCIAL

## 2018-02-08 VITALS
TEMPERATURE: 100.2 F | DIASTOLIC BLOOD PRESSURE: 78 MMHG | OXYGEN SATURATION: 93 % | HEIGHT: 64 IN | HEART RATE: 96 BPM | SYSTOLIC BLOOD PRESSURE: 116 MMHG | WEIGHT: 132 LBS | BODY MASS INDEX: 22.53 KG/M2

## 2018-02-08 DIAGNOSIS — R90.89 ABNORMAL BRAIN MRI: ICD-10-CM

## 2018-02-08 DIAGNOSIS — E34.8 PINEAL GLAND CYST: ICD-10-CM

## 2018-02-08 PROCEDURE — 99215 OFFICE O/P EST HI 40 MIN: CPT | Performed by: PSYCHIATRY & NEUROLOGY
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
## 2018-02-10 NOTE — ASSESSMENT & PLAN NOTE
Ms. 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 On her initial brain MRI it wasn't clear whether this singular punctate lesion was artifactual, was located within the cerebellar hemisphere parenchyma or was part of the dural meninges between the cerebellum and the hemispheres. We repeated the brain MRI and the lesion is still present in the same location. The radiologist who read the study commented that it was within the cerebellar hemisphere. While they comment that there is no evidence of demyelinating disease, this spot could represent a demyelinating lesion. I explained this to Ms. Arce and agreed that I would discuss the image with the radiologist. I discussed with her that at this time, based on possibly one clinical attack and her MRI imaging showing only one lesion in isolation, she does not meet the 2017 Godfrey criteria for MS. However, the criteria recently changed to include oligoclonal bands as a surrogate for separation in space or time. She would like to have this test done rather than wait and repeat imaging in 6-12 months. I explained to her what a lumbar puncture procedure entails including the potential risks of a post-LP headache and potential need for a blood patch.    Plan:  1. Lumbar puncture for diagnosis through Interventional Radiology (referral placed). Will check MS panel, Oligoclonal bands - patient given a lab slip to have her blood drawn right before LP procedure, ACE CSF, cell counts, protein and glucose.  2. She can continue with vitamin D and vitamin B12.  3. Return in 3 weeks after the procedure has been done and labs resulted.  4. I will discuss the right cerebellar MRI lesion with the radiology department at [facility].  5. Bloodwork to rule out MS mimickers

## 2018-02-16 DIAGNOSIS — R90.89 ABNORMAL BRAIN MRI: ICD-10-CM

## 2018-02-23 ENCOUNTER — HOSPITAL ENCOUNTER (OUTPATIENT)
Dept: RADIOLOGY | Facility: MEDICAL CENTER | Age: 38
End: 2018-02-23
Attending: PSYCHIATRY & NEUROLOGY
Payer: COMMERCIAL

## 2018-02-23 ENCOUNTER — HOSPITAL ENCOUNTER (OUTPATIENT)
Dept: LAB | Facility: MEDICAL CENTER | Age: 38
End: 2018-02-23
Attending: NURSE PRACTITIONER
Payer: COMMERCIAL

## 2018-02-23 ENCOUNTER — HOSPITAL ENCOUNTER (OUTPATIENT)
Dept: LAB | Facility: MEDICAL CENTER | Age: 38
End: 2018-02-23
Attending: PSYCHIATRY & NEUROLOGY
Payer: COMMERCIAL

## 2018-02-23 ENCOUNTER — HOSPITAL ENCOUNTER (OUTPATIENT)
Facility: MEDICAL CENTER | Age: 38
End: 2018-02-23
Attending: PSYCHIATRY & NEUROLOGY
Payer: COMMERCIAL

## 2018-02-23 DIAGNOSIS — R90.89 ABNORMAL BRAIN MRI: ICD-10-CM

## 2018-02-23 DIAGNOSIS — R59.1 LYMPHADENOPATHY OF HEAD AND NECK: ICD-10-CM

## 2018-02-23 LAB
ALBUMIN SERPL BCP-MCNC: 4.4 G/DL (ref 3.2–4.9)
ALBUMIN/GLOB SERPL: 1.8 G/DL
ALP SERPL-CCNC: 36 U/L (ref 30–99)
ALT SERPL-CCNC: 10 U/L (ref 2–50)
ANION GAP SERPL CALC-SCNC: 6 MMOL/L (ref 0–11.9)
APTT PPP: 31 SEC (ref 24.7–36)
AST SERPL-CCNC: 12 U/L (ref 12–45)
BASOPHILS # BLD AUTO: 0.8 % (ref 0–1.8)
BASOPHILS # BLD: 0.03 K/UL (ref 0–0.12)
BILIRUB SERPL-MCNC: 0.4 MG/DL (ref 0.1–1.5)
BUN SERPL-MCNC: 8 MG/DL (ref 8–22)
BURR CELLS/RBC NFR CSF MANUAL: 0 %
CALCIUM SERPL-MCNC: 9.2 MG/DL (ref 8.5–10.5)
CHLORIDE SERPL-SCNC: 105 MMOL/L (ref 96–112)
CK SERPL-CCNC: 24 U/L (ref 0–154)
CLARITY CSF: CLEAR
CO2 SERPL-SCNC: 27 MMOL/L (ref 20–33)
COLOR CSF: COLORLESS
COLOR SPUN CSF: COLORLESS
CREAT SERPL-MCNC: 0.71 MG/DL (ref 0.5–1.4)
CYTOLOGY REG CYTOL: NORMAL
EOSINOPHIL # BLD AUTO: 0.05 K/UL (ref 0–0.51)
EOSINOPHIL NFR BLD: 1.3 % (ref 0–6.9)
ERYTHROCYTE [DISTWIDTH] IN BLOOD BY AUTOMATED COUNT: 39.7 FL (ref 35.9–50)
EST. AVERAGE GLUCOSE BLD GHB EST-MCNC: 111 MG/DL
GLOBULIN SER CALC-MCNC: 2.4 G/DL (ref 1.9–3.5)
GLUCOSE CSF-MCNC: 60 MG/DL (ref 40–80)
GLUCOSE SERPL-MCNC: 107 MG/DL (ref 65–99)
GRAM STN SPEC: NORMAL
HBA1C MFR BLD: 5.5 % (ref 0–5.6)
HCT VFR BLD AUTO: 41.1 % (ref 37–47)
HCV AB SER QL: NEGATIVE
HGB BLD-MCNC: 13.6 G/DL (ref 12–16)
IMM GRANULOCYTES # BLD AUTO: 0.01 K/UL (ref 0–0.11)
IMM GRANULOCYTES NFR BLD AUTO: 0.3 % (ref 0–0.9)
INR PPP: 1.02 (ref 0.87–1.13)
LYMPHOCYTES # BLD AUTO: 1.29 K/UL (ref 1–4.8)
LYMPHOCYTES NFR BLD: 33.5 % (ref 22–41)
LYMPHOCYTES NFR CSF: 95 %
MCH RBC QN AUTO: 26.9 PG (ref 27–33)
MCHC RBC AUTO-ENTMCNC: 33.1 G/DL (ref 33.6–35)
MCV RBC AUTO: 81.4 FL (ref 81.4–97.8)
MONOCYTES # BLD AUTO: 0.19 K/UL (ref 0–0.85)
MONOCYTES NFR BLD AUTO: 4.9 % (ref 0–13.4)
MONONUC CELLS NFR CSF: 5 %
NEUTROPHILS # BLD AUTO: 2.28 K/UL (ref 2–7.15)
NEUTROPHILS NFR BLD: 59.2 % (ref 44–72)
NRBC # BLD AUTO: 0 K/UL
NRBC BLD-RTO: 0 /100 WBC
PLATELET # BLD AUTO: 252 K/UL (ref 164–446)
PMV BLD AUTO: 9.6 FL (ref 9–12.9)
POTASSIUM SERPL-SCNC: 3.9 MMOL/L (ref 3.6–5.5)
PROT CSF-MCNC: 84 MG/DL (ref 15–45)
PROT SERPL-MCNC: 6.8 G/DL (ref 6–8.2)
PROTHROMBIN TIME: 13.1 SEC (ref 12–14.6)
RBC # BLD AUTO: 5.05 M/UL (ref 4.2–5.4)
RBC # CSF: 1 CELLS/UL
SIGNIFICANT IND 70042: NORMAL
SITE SITE: NORMAL
SODIUM SERPL-SCNC: 138 MMOL/L (ref 135–145)
SOURCE SOURCE: NORMAL
SPECIMEN VOL CSF: 10.5 ML
THYROPEROXIDASE AB SERPL-ACNC: 0.9 IU/ML (ref 0–9)
TREPONEMA PALLIDUM IGG+IGM AB [PRESENCE] IN SERUM OR PLASMA BY IMMUNOASSAY: NON REACTIVE
TUBE # CSF: 3
TUBE # CSF: 3
VIT B12 SERPL-MCNC: 1253 PG/ML (ref 211–911)
WBC # BLD AUTO: 3.9 K/UL (ref 4.8–10.8)
WBC # CSF: 2 CELLS/UL (ref 0–10)

## 2018-02-23 PROCEDURE — 88108 CYTOPATH CONCENTRATE TECH: CPT

## 2018-02-23 PROCEDURE — 86780 TREPONEMA PALLIDUM: CPT

## 2018-02-23 PROCEDURE — 80053 COMPREHEN METABOLIC PANEL: CPT

## 2018-02-23 PROCEDURE — 82042 OTHER SOURCE ALBUMIN QUAN EA: CPT

## 2018-02-23 PROCEDURE — 86803 HEPATITIS C AB TEST: CPT

## 2018-02-23 PROCEDURE — 83036 HEMOGLOBIN GLYCOSYLATED A1C: CPT

## 2018-02-23 PROCEDURE — 84157 ASSAY OF PROTEIN OTHER: CPT

## 2018-02-23 PROCEDURE — 87529 HSV DNA AMP PROBE: CPT | Mod: 91

## 2018-02-23 PROCEDURE — 82164 ANGIOTENSIN I ENZYME TEST: CPT

## 2018-02-23 PROCEDURE — 62270 DX LMBR SPI PNXR: CPT

## 2018-02-23 PROCEDURE — 82784 ASSAY IGA/IGD/IGG/IGM EACH: CPT

## 2018-02-23 PROCEDURE — 87070 CULTURE OTHR SPECIMN AEROBIC: CPT

## 2018-02-23 PROCEDURE — 82040 ASSAY OF SERUM ALBUMIN: CPT

## 2018-02-23 PROCEDURE — 85610 PROTHROMBIN TIME: CPT

## 2018-02-23 PROCEDURE — 89051 BODY FLUID CELL COUNT: CPT

## 2018-02-23 PROCEDURE — 83516 IMMUNOASSAY NONANTIBODY: CPT

## 2018-02-23 PROCEDURE — 82945 GLUCOSE OTHER FLUID: CPT

## 2018-02-23 PROCEDURE — 87205 SMEAR GRAM STAIN: CPT

## 2018-02-23 PROCEDURE — 83916 OLIGOCLONAL BANDS: CPT

## 2018-02-23 PROCEDURE — 36415 COLL VENOUS BLD VENIPUNCTURE: CPT

## 2018-02-23 PROCEDURE — 82607 VITAMIN B-12: CPT

## 2018-02-23 PROCEDURE — 86376 MICROSOMAL ANTIBODY EACH: CPT

## 2018-02-23 PROCEDURE — 86038 ANTINUCLEAR ANTIBODIES: CPT

## 2018-02-23 PROCEDURE — 82550 ASSAY OF CK (CPK): CPT

## 2018-02-23 PROCEDURE — 85730 THROMBOPLASTIN TIME PARTIAL: CPT

## 2018-02-23 PROCEDURE — 85025 COMPLETE CBC W/AUTO DIFF WBC: CPT

## 2018-02-24 LAB — NUCLEAR IGG SER QL IA: NORMAL

## 2018-02-25 LAB
ACE CSF-CCNC: 2.2 U/L (ref 0–2.5)
ALB CSF/SERPL: 12.6 RATIO (ref 0–9)
ALBUMIN CSF-MCNC: 56 MG/DL (ref 0–35)
ALBUMIN SERPL-MCNC: 4460 MG/DL (ref 3500–5200)
IGG CSF-MCNC: 6.1 MG/DL (ref 0–6)
IGG SERPL-MCNC: 1120 MG/DL (ref 768–1632)
IGG SYNTH RATE SER+CSF CALC-MRATE: <0 MG/D
IGG/ALB CLEAR SER+CSF-RTO: 0.43 RATIO (ref 0.28–0.66)
IGG/ALB CSF: 0.11 RATIO (ref 0.09–0.25)
OLIGOCLONAL BANDS CSF ELPH-IMP: ABNORMAL
OLIGOCLONAL BANDS CSF ELPH-IMP: NEGATIVE
OLIGOCLONAL BANDS CSF IEF: 0 BANDS (ref 0–1)

## 2018-02-26 LAB
AQP4 H2O CHANNEL AB SERPL IA-ACNC: 1.4 U/ML
BACTERIA CSF CULT: NORMAL
GRAM STN SPEC: NORMAL
SIGNIFICANT IND 70042: NORMAL
SITE SITE: NORMAL
SOURCE SOURCE: NORMAL

## 2018-02-27 ENCOUNTER — HOSPITAL ENCOUNTER (EMERGENCY)
Facility: MEDICAL CENTER | Age: 38
End: 2018-02-27
Attending: EMERGENCY MEDICINE
Payer: COMMERCIAL

## 2018-02-27 ENCOUNTER — PATIENT MESSAGE (OUTPATIENT)
Dept: FAMILY PLANNING/WOMEN'S HEALTH CLINIC | Facility: OTHER | Age: 38
End: 2018-02-27

## 2018-02-27 VITALS
WEIGHT: 135.14 LBS | HEIGHT: 64 IN | BODY MASS INDEX: 23.07 KG/M2 | OXYGEN SATURATION: 98 % | SYSTOLIC BLOOD PRESSURE: 121 MMHG | HEART RATE: 77 BPM | DIASTOLIC BLOOD PRESSURE: 72 MMHG | RESPIRATION RATE: 16 BRPM | TEMPERATURE: 98.4 F

## 2018-02-27 DIAGNOSIS — T81.89XA POST-PROCEDURAL HEADACHE, INITIAL ENCOUNTER: ICD-10-CM

## 2018-02-27 DIAGNOSIS — R51.9 POST-PROCEDURAL HEADACHE, INITIAL ENCOUNTER: ICD-10-CM

## 2018-02-27 PROCEDURE — 99284 EMERGENCY DEPT VISIT MOD MDM: CPT

## 2018-02-27 PROCEDURE — 62273 INJECT EPIDURAL PATCH: CPT

## 2018-02-27 PROCEDURE — 700105 HCHG RX REV CODE 258: Performed by: EMERGENCY MEDICINE

## 2018-02-27 PROCEDURE — 96374 THER/PROPH/DIAG INJ IV PUSH: CPT

## 2018-02-27 PROCEDURE — 700111 HCHG RX REV CODE 636 W/ 250 OVERRIDE (IP): Performed by: EMERGENCY MEDICINE

## 2018-02-27 RX ORDER — ONDANSETRON 2 MG/ML
4 INJECTION INTRAMUSCULAR; INTRAVENOUS ONCE
Status: COMPLETED | OUTPATIENT
Start: 2018-02-27 | End: 2018-02-27

## 2018-02-27 RX ORDER — SODIUM CHLORIDE 9 MG/ML
1000 INJECTION, SOLUTION INTRAVENOUS ONCE
Status: COMPLETED | OUTPATIENT
Start: 2018-02-27 | End: 2018-02-27

## 2018-02-27 RX ADMIN — ONDANSETRON 4 MG: 2 INJECTION INTRAMUSCULAR; INTRAVENOUS at 15:48

## 2018-02-27 RX ADMIN — SODIUM CHLORIDE 1000 ML: 9 INJECTION, SOLUTION INTRAVENOUS at 15:47

## 2018-02-27 ASSESSMENT — PAIN SCALES - GENERAL: PAINLEVEL_OUTOF10: 2

## 2018-02-27 NOTE — ED TRIAGE NOTES
.  Chief Complaint   Patient presents with   • Post-Op Complications     lumbar puncture friday   • Headache     worsens when sitting upright    sent by physician for possible blood patch. Pt reports had caffeine and Advil this am which has helped some.

## 2018-02-27 NOTE — ED PROVIDER NOTES
ED Provider Note    Scribed for Glenda Lord M.D. by Rand Chilel. 2/27/2018, 3:29 PM.    Primary care provider: ETTA Barbosa  Means of arrival: walk in   History obtained from: patient   History limited by: none     CHIEF COMPLAINT  Chief Complaint   Patient presents with   • Post-Op Complications     lumbar puncture friday   • Headache     worsens when sitting upright        HPI  Nubia Arce is a 37 y.o. female who presents to the Emergency Department for evaluation of an intermittent headache onset three days ago. Her headache is located to her posterior head. She reports having a lumbar puncture four days ago on 2/23 to rule out MS. Her headache was slightly improved yesterday after being advised by her neurologist to drink more caffeine and water. Additionally, she reports her headache is improved with laying flat. She has associated nausea, dizziness and plugging of her bilateral ears. Her nausea is currently improved. She was advised to be seen in the ED by her neurologist after her headache was not improved in the past 24 hours. She denies fever and rash.       REVIEW OF SYSTEMS  Pertinent positives include headache, nausea, dizziness, ear plugging.   Pertinent negatives include no fever, rash.   As above, all other systems reviewed and are negative. C.   See HPI for further details.       PAST MEDICAL HISTORY   has a past medical history of Allergy; Depression; HLA B27 (HLA B27 positive); MTHFR gene mutation (CMS-[facility]); and Psoriatic arthritis (CMS-HCC).       SURGICAL HISTORY  Past Surgical History:   Procedure Laterality Date   • PRIMARY C SECTION         SOCIAL HISTORY  Social History   Substance Use Topics   • Smoking status: Never Smoker   • Smokeless tobacco: Never Used   • Alcohol use Yes      Comment: Socially      History   Drug Use No       FAMILY HISTORY  Family History   Problem Relation Age of Onset   • Psychiatry Mother      depression   • Hypertension Mother   "  • Arthritis Father      psoriatic   • Hypertension Father    • Psychiatry Father      depression   • Cancer Maternal Grandmother    • Cancer Maternal Grandfather      stomach   • Other Son      speech apraxia       CURRENT MEDICATIONS  Home Medications    **Home medications have not yet been reviewed for this encounter**         ALLERGIES  Allergies   Allergen Reactions   • Doxycycline Vomiting     2016        PHYSICAL EXAM  VITAL SIGNS: /78   Pulse 78   Temp 36.9 °C (98.4 °F)   Resp 16   Ht 1.626 m (5' 4\")   Wt 61.3 kg (135 lb 2.3 oz)   SpO2 99%   BMI 23.20 kg/m²   Vitals reviewed.  Consitutional: Well-developed, well-nourished. Negative for: distress.  HENT: Normocephalic, atraumatic, right external ear normal, left external ear normal, oropharynx clear and moist.  Eyes: PERRLA at 3 mm. Conjunctivae normal, negative for left and right eye discharge.  Neck: Range of motion normal, supple.   Musculoskeletal: Normal range of motion.  Neurological: Alert and oriented x3. Cranial nerve and cerebellar exam are normal. Sensation is intact to the hands, feet, face  Skin: Warm, dry. Negative for: erythema, rash.  Psych: Mood/affect normal, behavior normal.        COURSE & MEDICAL DECISION MAKING  Nursing notes, VS, PMSFHx reviewed in chart.    Obtained and reviewed past medical records.    3:29 PM - Patient seen and examined at bedside. Discussed plan of care with patient. She agreed to consult with anesthesiology for blood patch procedure. Patient will be treated with IV fluids secondary to history of postprocedural headache and Zofran 4 mg for her symptoms.      3:39 PM Paged anesthesiology.     4:05 PM Consult with anesthesiology who agreed to see the patient for blood patch procedure.     6:30 PM Patient was discharged home after completion of her blood patch procedure. She understands return precautions and will follow up with primary care.       DISPOSITION:  Patient will be discharged home in stable " condition.      FOLLOW UP:  Guillermina Salinas D.O.  [address] [id number] [phone]    Call in 1 day        OUTPATIENT MEDICATIONS:  New Prescriptions    No medications on file       FINAL IMPRESSION  1. Post-procedural headache, initial encounter         Rand CORTEZ (Scribe), am scribing for, and in the presence of, Glenda Lord M.D..  Electronically signed by: Rand Chilel (Scribe), 2/27/2018  Glenda CORTEZ M.D. personally performed the services described in this documentation, as scribed by Rand Chilel in my presence, and it is both accurate and complete.    The note accurately reflects work and decisions made by me.  Glenda Lord  2/27/2018  8:50 PM

## 2018-02-27 NOTE — ED TRIAGE NOTES
Pt ambulatory to room purple 77.  States headache is 2/10.  7/10 earlier today.  Chart up for erp.

## 2018-02-28 NOTE — ED NOTES
Pt back from procedure, A&Ox4, states she is ready to go and feels great. Pt requests to drive home per anesthesiologist Asher states she is ok to drive no narcs given to hinder pt driving abilities.   Pt up for discharge

## 2018-02-28 NOTE — ED NOTES
Assisted anesthesiologist with blood patch.  Pt tolerated well.  Pt flat on back.  Understands instructions to stay flat for 1 hour.  Call light in reach.

## 2018-02-28 NOTE — DISCHARGE INSTRUCTIONS
Spinal Headache  A spinal headache is a severe headache that can happen after getting a spinal tap, also called lumbar puncture, or an epidural anesthetic. Both of these procedures involve passing a needle through ligaments that run along the back side of your spinal column and into one of the spaces just above your spinal cord. Sometimes spinal fluid leaks through the temporary hole left by the needle. This leak causes a decrease in spinal fluid pressure, which leads to a spinal headache. The headache usually begins within hours or 1-2 days after the procedure, and it lasts until adequate pressure returns as your body creates more spinal fluid. The headache can last a few days and rarely lasts for more than 1 week.  SIGNS AND SYMPTOMS   · Severe headache pain when sitting or standing.  · Decreased headache pain when lying down.  · Neck pain, especially when flexing the neck in a chin-to-chest position.  · Vomiting.  DIAGNOSIS   Diagnosis of spinal headache is usually made based on your recent medical history. Your health care provider will consider the timing of a recent spinal tap or epidural anesthetic, along with how soon your headache occurred afterward. On rare occasions, tests may be done to confirm the diagnosis, such as an MRI.  TREATMENT   Treatment may include:  · Drinking extra fluids to improve your level of hydration. This will help your body replace the spinal fluid that has leaked out through the needle hole. Receiving IV fluids may be necessary.  · Taking pain medicine as prescribed by your health care provider.  · Drinking caffeinated beverages such as soda, coffee, or tea. Caffeine may help to shrink the blood vessels in your brain, which may reduce your headache pain.  · Lying flat for a few days.  · Having a blood patch procedure, which involves injecting a small amount of your blood at the puncture site to seal the leak.  HOME CARE INSTRUCTIONS  · Lie down to relieve pain if your pain gets  worse when you sit or stand.  · Drink enough fluids to keep your urine clear or pale yellow.  · Take pain medicine as directed by your health care provider.  SEEK IMMEDIATE MEDICAL CARE IF:   · Your pain becomes very severe or cannot be controlled.  · You develop a fever.  · You have a stiff neck.  · You lose bowel or bladder control.  · You have trouble walking.  MAKE SURE YOU:  · Understand these instructions.  · Will watch your condition.    · Will get help right away if you are not doing well or get worse.     This information is not intended to replace advice given to you by your health care provider. Make sure you discuss any questions you have with your health care provider.     Document Released: 06/09/2003 Document Revised: 12/23/2014 Document Reviewed: 07/10/2014  [employer] Interactive Patient Education ©2016 [employer]

## 2018-02-28 NOTE — ED NOTES
Pt ambulates with out difficulties, denies paresthesia, pt has a small amount of weakness in left leg on her way to the lobby,

## 2018-03-01 ENCOUNTER — OFFICE VISIT (OUTPATIENT)
Dept: NEUROLOGY | Facility: MEDICAL CENTER | Age: 38
End: 2018-03-01
Payer: COMMERCIAL

## 2018-03-01 VITALS
HEIGHT: 64 IN | OXYGEN SATURATION: 98 % | HEART RATE: 107 BPM | RESPIRATION RATE: 16 BRPM | DIASTOLIC BLOOD PRESSURE: 70 MMHG | SYSTOLIC BLOOD PRESSURE: 120 MMHG | WEIGHT: 136.47 LBS | TEMPERATURE: 98.8 F | BODY MASS INDEX: 23.3 KG/M2

## 2018-03-01 DIAGNOSIS — R20.0 NUMBNESS AND TINGLING: ICD-10-CM

## 2018-03-01 DIAGNOSIS — R20.2 NUMBNESS AND TINGLING: ICD-10-CM

## 2018-03-01 DIAGNOSIS — R90.89 ABNORMAL BRAIN MRI: ICD-10-CM

## 2018-03-01 PROCEDURE — 99215 OFFICE O/P EST HI 40 MIN: CPT | Performed by: PSYCHIATRY & NEUROLOGY
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
## 2018-03-02 DIAGNOSIS — R83.8 ELEVATED CSF PROTEIN: ICD-10-CM

## 2018-03-02 LAB
HSV1+2 DNA SPEC QL NAA+PROBE: NORMAL
SIGNIFICANT IND 70042: NORMAL
SITE SITE: NORMAL
SOURCE SOURCE: NORMAL
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
## 2018-03-13 DIAGNOSIS — R90.89 ABNORMAL BRAIN MRI: ICD-10-CM

## 2018-03-15 ENCOUNTER — OFFICE VISIT (OUTPATIENT)
Dept: MEDICAL GROUP | Facility: PHYSICIAN GROUP | Age: 38
End: 2018-03-15
Payer: COMMERCIAL

## 2018-03-15 VITALS
SYSTOLIC BLOOD PRESSURE: 110 MMHG | DIASTOLIC BLOOD PRESSURE: 72 MMHG | WEIGHT: 138 LBS | HEIGHT: 64 IN | TEMPERATURE: 98.2 F | HEART RATE: 70 BPM | RESPIRATION RATE: 16 BRPM | OXYGEN SATURATION: 98 % | BODY MASS INDEX: 23.56 KG/M2

## 2018-03-15 DIAGNOSIS — R20.2 NUMBNESS AND TINGLING OF LEFT SIDE OF FACE: ICD-10-CM

## 2018-03-15 DIAGNOSIS — R90.89 ABNORMAL BRAIN MRI: ICD-10-CM

## 2018-03-15 DIAGNOSIS — R20.0 NUMBNESS AND TINGLING OF LEFT SIDE OF FACE: ICD-10-CM

## 2018-03-15 DIAGNOSIS — L40.50 PSORIATIC ARTHRITIS (HCC): ICD-10-CM

## 2018-03-15 PROCEDURE — 99214 OFFICE O/P EST MOD 30 MIN: CPT | Performed by: NURSE PRACTITIONER

## 2018-03-15 ASSESSMENT — PAIN SCALES - GENERAL: PAINLEVEL: NO PAIN

## 2018-03-15 NOTE — ASSESSMENT & PLAN NOTE
Chronic in nature. Patient continues to have intermittent issues with numbness and tingling. Patient continues to follow with Dr. Salinas, Dr. Martinez.

## 2018-03-15 NOTE — PROGRESS NOTES
Chief Complaint   Patient presents with   • Results       HISTORY OF PRESENT ILLNESS: Patient is a 37 y.o. female established patient who presents today to discuss lab results.     Psoriatic arthritis (CMS-HCC)  Chronic in nature. Stable. Patient continues follow-up with Dr. Martinez.    Abnormal brain MRI  She states that she continues follow-up with Dr. Salinas, states that she continues to have the pressure in her neck, numbness and tingling, states that since she had a spinal tap, blood patch she is not having pressure, change in her vision states that she is also having some ear pain. Patient states that she does have referral to follow up regarding these issues at [place].    Numbness and tingling of left side of face  Chronic in nature. Patient continues to have intermittent issues with numbness and tingling. Patient continues to follow with Dr. Salinas, Dr. Martinez.      Patient Active Problem List    Diagnosis Date Noted   • Lymphadenopathy of head and neck 02/01/2018   • Abnormal brain MRI 01/23/2018   • Pineal gland cyst 01/23/2018   • Dizziness 12/29/2017   • Seasonal depression (CMS-[facility]) 12/29/2017   • Numbness and tingling of left side of face 09/05/2017   • Decreased sensation of lower extremity 06/30/2017   • Gastroesophageal reflux disease 06/30/2017   • Neck pain 03/13/2017   • Psoriatic arthritis (CMS-HCC) 02/09/2017   • Left upper quadrant pain 02/09/2017       Allergies:Doxycycline    Current Outpatient Prescriptions   Medication Sig Dispense Refill   • sertraline (ZOLOFT) 25 MG tablet Take 25 mg by mouth every day.     • NALTREXONE HCL PO Take 1 mg by mouth every day.  2   • sertraline (ZOLOFT) 50 MG Tab Take 1 Tab by mouth every day. (Patient taking differently: Take 25 mg by mouth every day.) 90 Tab 0   • omeprazole (PRILOSEC) 20 MG delayed-release capsule Take 1 Cap by mouth every day. 90 Cap 3   • Omega-3 Fatty Acids (FISH OIL) 1000 MG Cap capsule Take 1,000 mg by mouth 3 times a day, with meals.      • zinc sulfate 1 MG/ML Solution by Intravenous route.     • cyanocobalamin (VITAMIN B-12) 500 MCG Tab Take 500 mcg by mouth every day.     • vitamin D (CHOLECALCIFEROL) 1000 UNIT Tab Take 6,000 Units by mouth every day.       No current facility-administered medications for this visit.        Social History   Substance Use Topics   • Smoking status: Never Smoker   • Smokeless tobacco: Never Used   • Alcohol use Yes      Comment: Socially       Family Status   Relation Status   • Mother Alive   • Father    • Maternal Grandmother    • Maternal Grandfather    • Brother Alive   • Son Alive     Family History   Problem Relation Age of Onset   • Psychiatry Mother      depression   • Hypertension Mother    • Arthritis Father      psoriatic   • Hypertension Father    • Psychiatry Father      depression   • Cancer Maternal Grandmother    • Cancer Maternal Grandfather      stomach   • Other Son      speech apraxia       Review of Systems:   Constitutional:  Negative for fever, chills, weight loss and malaise/fatigue.   HEENT:  Negative for ear pain, nosebleeds, congestion, sore throat and neck pain.    Eyes:  Negative for blurred vision.   Respiratory:  Negative for cough, sputum production, shortness of breath and wheezing.    Cardiovascular:  Negative for chest pain, palpitations, orthopnea and leg swelling.   Gastrointestinal:  Negative for heartburn, nausea, vomiting and abdominal pain.   Genitourinary:  Negative for dysuria, urgency and frequency.   Musculoskeletal:  Negative for myalgias, back pain and joint pain.   Skin:  Negative for rash and itching.   Neurological:  Negative for dizziness, tingling, tremors, sensory change, focal weakness and headaches.   Endo/Heme/Allergies:  Does not bruise/bleed easily.   Psychiatric/Behavioral:  Negative for depression, suicidal ideas and memory loss.  The patient is not nervous/anxious and does not have insomnia.    All other systems reviewed and are negative except  "as in HPI.    Exam:  Blood pressure 110/72, pulse 70, temperature 36.8 °C (98.2 °F), resp. rate 16, height 1.626 m (5' 4\"), weight 62.6 kg (138 lb), last menstrual period 03/12/2018, SpO2 98 %, not currently breastfeeding.  General:  Normal appearing. No distress.  Pulmonary:  Clear to ausculation.  Normal effort. No rales, ronchi, or wheezing.  Cardiovascular:  Regular rate and rhythm without murmur. Carotid and radial pulses are intact and equal bilaterally.  Neurologic:  Grossly nonfocal, does report sensory changes.  Lymph:  No cervical, supraclavicular or axillary lymph nodes are palpable  Skin:  Warm and dry.  No obvious lesions.  Musculoskeletal:  Normal gait. No extremity cyanosis, clubbing, or edema.  Psych:  Normal mood and affect. Alert and oriented x3. Judgment and insight is normal.      PLAN:    1. Psoriatic arthritis (CMS-[facility])  Continue follow-up with Dr. Martinez    2. Abnormal brain MRI  Continue follow-up with neurology, [place]    3. Numbness and tingling of left side of face  Continue follow-up with neurology, [place].    Counseled patient regarding lab results including possible reasons for decreased white blood cells.    Follow-up in 6 months or sooner as needed. Patient is encouraged to be seen in the emergency room for chest pain, palpitations, shortness of breath, dizziness, severe abdominal pain or other concerning symptoms.    Please note that this dictation was created using voice recognition software. I have made every reasonable attempt to correct obvious errors, but I expect that there are errors of grammar and possibly content that I did not discover before finalizing the note.    Assessment/Plan:  1. Psoriatic arthritis (CMS-HCC)     2. Abnormal brain MRI     3. Numbness and tingling of left side of face            I have placed the below orders and discussed them with an approved delegating provider. The MA is performing the below orders under the direction of Dr. Frias.  "

## 2018-03-15 NOTE — ASSESSMENT & PLAN NOTE
She states that she continues follow-up with Dr. Salinas, states that she continues to have the pressure in her neck, numbness and tingling, states that since she had a spinal tap, blood patch she is not having pressure, change in her vision states that she is also having some ear pain. Patient states that she does have referral to follow up regarding these issues at [place].

## 2018-03-16 ENCOUNTER — HOSPITAL ENCOUNTER (OUTPATIENT)
Dept: RADIOLOGY | Facility: MEDICAL CENTER | Age: 38
End: 2018-03-16

## 2018-05-20 ENCOUNTER — PATIENT MESSAGE (OUTPATIENT)
Dept: MEDICAL GROUP | Facility: PHYSICIAN GROUP | Age: 38
End: 2018-05-20

## 2018-05-20 DIAGNOSIS — M54.2 NECK PAIN: ICD-10-CM

## 2018-05-21 ENCOUNTER — PATIENT MESSAGE (OUTPATIENT)
Dept: MEDICAL GROUP | Facility: PHYSICIAN GROUP | Age: 38
End: 2018-05-21

## 2018-05-22 ENCOUNTER — PATIENT MESSAGE (OUTPATIENT)
Dept: MEDICAL GROUP | Facility: PHYSICIAN GROUP | Age: 38
End: 2018-05-22

## 2018-05-22 DIAGNOSIS — G89.29 CHRONIC PAIN OF RIGHT ANKLE: ICD-10-CM

## 2018-05-22 DIAGNOSIS — Z12.4 SCREENING FOR CERVICAL CANCER: ICD-10-CM

## 2018-05-22 DIAGNOSIS — M79.604 PAIN IN RIGHT LEG: ICD-10-CM

## 2018-05-22 DIAGNOSIS — M54.2 NECK PAIN: ICD-10-CM

## 2018-05-22 DIAGNOSIS — M25.571 CHRONIC PAIN OF RIGHT ANKLE: ICD-10-CM

## 2018-05-23 ENCOUNTER — OFFICE VISIT (OUTPATIENT)
Dept: NEUROLOGY | Facility: MEDICAL CENTER | Age: 38
End: 2018-05-23
Payer: COMMERCIAL

## 2018-05-23 VITALS
SYSTOLIC BLOOD PRESSURE: 116 MMHG | OXYGEN SATURATION: 100 % | DIASTOLIC BLOOD PRESSURE: 78 MMHG | WEIGHT: 130 LBS | RESPIRATION RATE: 14 BRPM | HEIGHT: 64 IN | BODY MASS INDEX: 22.2 KG/M2 | HEART RATE: 88 BPM | TEMPERATURE: 97.9 F

## 2018-05-23 DIAGNOSIS — R53.83 FATIGUE, UNSPECIFIED TYPE: ICD-10-CM

## 2018-05-23 DIAGNOSIS — R20.8 DYSESTHESIA: ICD-10-CM

## 2018-05-23 DIAGNOSIS — D32.9 MENINGIOMA (HCC): ICD-10-CM

## 2018-05-23 PROCEDURE — 99215 OFFICE O/P EST HI 40 MIN: CPT | Performed by: PSYCHIATRY & NEUROLOGY

## 2018-05-23 NOTE — PROGRESS NOTES
CC: Dysesthesias      HPI:    Nubia Arce is a 37 y.o. Female with pmhx of psoriatic arthritis, pineal gland cyst, and incidental right temporal meningioma, who presents today in neurologic follow up for dysesthesias. Ms. Arce tells me that she developed a burning sensation in her left palm 2-3 weeks ago. In addition, the chronic nerve pain she experiences in her right leg, which has evidence of a peroneal neuropathy on EMG, has also seemed worse. She denies any changes in her medications or diet. She was diagnosed with carpal tunnel clinically in the past and recommended to use a wrist splint, but she cannot recall which wrist that was. She denies any recent illnesses or infections.     She also tells me that she was seen at [facility] for a consult with neuro-oncology to discuss the right tentorial meningioma. She reports that they gave her similar advice, just to repeat her MRI after about a year. She continues to experience a pain in her left neck as well. She is not currently on any treatment for her psoriatic arthritis. Her psoriasis plaques mainly occur on her scalp.     ROS:   Constitutional: No fevers or chills.  Eyes: No blurry vision or eye pain.  ENT: No dysphagia or hearing loss.  Respiratory: No cough or shortness of breath.  Cardiovascular: No chest pain or palpitations.  GI: No nausea, vomiting, or diarrhea.  : No urinary incontinence or dysuria.  Musculoskeletal: No joint swelling or arthralgias.  Skin: No skin rashes.  Neuro: No headaches, dizziness, or tremors.  Endocrine: No heat or cold intolerance. No polydipsia or polyuria.  Psych: No depression or anxiety.  Heme/Lymph: No easy bruising or swollen lymph nodes.      Past Medical History:   Past Medical History:   Diagnosis Date   • Allergy    • Depression    • HLA B27 (HLA B27 positive)    • MTHFR gene mutation (HCC)    • Psoriatic arthritis (HCC)        Past Surgical History:   Past Surgical History:   Procedure Laterality Date   •  PRIMARY C SECTION         Social History:   Social History     Social History   • Marital status:      Spouse name: N/A   • Number of children: N/A   • Years of education: N/A     Occupational History   • Not on file.     Social History Main Topics   • Smoking status: Never Smoker   • Smokeless tobacco: Never Used   • Alcohol use Yes      Comment: Socially   • Drug use: No   • Sexual activity: Yes     Partners: Male     Birth control/ protection: Condom     Other Topics Concern   • Not on file     Social History Narrative   • No narrative on file       Allergies:   Allergies   Allergen Reactions   • Doxycycline Vomiting     2016          Physical Exam:   Constitutional: Well-developed, well-nourished, good hygiene. Appears stated age.  Cardiovascular: RRR, with no murmurs, rubs or gallops. No carotid bruits.   Respiratory: Lungs CTA B/L, no W/R/R.   Abdomen: Soft Non-tender to Palpation. Non-distended.  Extremities: No peripheral edema, pedal pulses intact.   Skin: Warm, dry, intact. No rashes observed.    Eyes: Sclera anicteric   Funduscopic: Optic discs flat with no evidence of papilledema or pallor. Normal posterior segments.  Neurologic:   Mental Status: Awake, alert, oriented x 3.   Speech: Fluent with normal prosody.   Memory: Able to recall 3 words at 1 minute and 5 minutes. Able to recall recent and remote events accurately.    Concentration: Attentive. Able to focus on history and follow multi-step commands.              Fund of Knowledge: Appropriate   Cranial Nerves:    CN II: PERRL. No afferent pupillary defect.    CN III, IV, VI: Good eye closure, EOMI.     CN V: Facial sensation intact and symmetric.     CN VII: No facial asymmetry.     CN VIII: Hearing intact.     CN IX and X: Palate elevates symmetrically. Normal gag reflex.    CN XI: Symmetric shoulder shrug.     CN XII: Tongue midline.    Sensory: Decreased temperature sensation in a stocking glove type small fiber distribution.     Coordination: No evidence of past-pointing on finger to nose testing, no dysdiadochokinesia. Heel to shin intact.             DTR's: 2+ throughout without clonus.    Babinski: Toes downgoing bilaterally.   Romberg: Negative.   Movements: No tremors observed.   Musculoskeletal:    Strength: 5/5 in upper and lower extremities bilaterally.   Gait: Steady, narrow based.    Tone: Normal bulk and tone.   Joints: No swelling.       Assessment/Plan:  Dysesthesia  Ms. Arce is a 36 yo F with pmhx of psoriatic arthritis, pineal cyst, small right tentorial meningioma, prior clinical diagnosis of carpal tunnel syndrome, who presents with new left burning sensation in her left palm and worsening of right lower extremity neuropathy. We had a discussion today regarding her diagnosis. I explained that if she had a neuropathy, it would have most likely shown up on her prior EMG, however, since there has been a change in her symptoms, and on exam, she has evidence of a small fiber type neuropathy in the arms and feet, it may be worth repeating her EMG in all four extremities. She is still scheduled to see Dr. Delacruz, and I advised her that if this repeat EMG is negative, that he may be able to guide her in terms of a small nerve biopsy. In the meantime, we will check additional lab studies. 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 We will check for this.     Plan:  1. Check bloodwork including anti-VILMA antibodies through the [facility]. This is available in a panel format. SSA/SSB, RA/CCP, Vitamin E, HIV, RPR  2. Will place order for EMG of bilateral upper and lower extremities  3. She will see Dr. Delacruz in July, but may come back in June for one additional follow up  4. We also discussed non-pharmacologic treatment options including topical CBD oil. She said she would look into this as a treatment option.    Meningioma (HCC)  Currently stable and asymptomatic. We  will monitor with a repeat brain MRI w/wo contrast in February 2019.       Greater than 50% of this 40 minute face to face encounter was devoted to disease state counseling and coordination of care.  Please see above assessment and plan for discussion.       Guillermina Salinas D.O., M.P.H  MS specialist.   Board Certified Neurologist.  Neurology Clerkship Director, [facility].    Neurology,  [facility].   Tel: [phone]  Fax: [phone]

## 2018-05-25 PROBLEM — D32.9 MENINGIOMA (HCC): Status: ACTIVE | Noted: 2018-05-25

## 2018-05-25 PROBLEM — R20.8 DYSESTHESIA: Status: ACTIVE | Noted: 2018-05-25

## 2018-05-25 NOTE — ASSESSMENT & PLAN NOTE
Ms. Arce is a 36 yo F with pmhx of psoriatic arthritis, pineal cyst, small right tentorial meningioma, prior clinical diagnosis of carpal tunnel syndrome, who presents with new left burning sensation in her left palm and worsening of right lower extremity neuropathy. We had a discussion today regarding her diagnosis. I explained that if she had a neuropathy, it would have most likely shown up on her prior EMG, however, since there has been a change in her symptoms, and on exam, she has evidence of a small fiber type neuropathy in the arms and feet, it may be worth repeating her EMG in all four extremities. She is still scheduled to see Dr. Delacruz, and I advised her that if this repeat EMG is negative, that he may be able to guide her in terms of a small nerve biopsy. In the meantime, we will check additional lab studies. 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 We will check for this.     Plan:  1. Check bloodwork including anti-VILMA antibodies through the [facility]. This is available in a panel format. SSA/SSB, RA/CCP, Vitamin E, HIV, RPR  2. Will place order for EMG of bilateral upper and lower extremities  3. She will see Dr. Delacruz in July, but may come back in June for one additional follow up  4. We also discussed non-pharmacologic treatment options including topical CBD oil. She said she would look into this as a treatment option.

## 2018-05-25 NOTE — ASSESSMENT & PLAN NOTE
Currently stable and asymptomatic. We will monitor with a repeat brain MRI w/wo contrast in February 2019.

## 2018-06-05 ENCOUNTER — APPOINTMENT (OUTPATIENT)
Dept: PHYSICAL THERAPY | Facility: REHABILITATION | Age: 38
End: 2018-06-05
Attending: NURSE PRACTITIONER
Payer: COMMERCIAL

## 2018-06-05 ENCOUNTER — HOSPITAL ENCOUNTER (OUTPATIENT)
Dept: LAB | Facility: MEDICAL CENTER | Age: 38
End: 2018-06-05
Attending: PSYCHIATRY & NEUROLOGY
Payer: COMMERCIAL

## 2018-06-05 DIAGNOSIS — R20.8 DYSESTHESIA: ICD-10-CM

## 2018-06-05 DIAGNOSIS — R53.83 FATIGUE, UNSPECIFIED TYPE: ICD-10-CM

## 2018-06-05 LAB
CK SERPL-CCNC: 32 U/L (ref 0–154)
FERRITIN SERPL-MCNC: 8.6 NG/ML (ref 10–291)
IRON SATN MFR SERPL: 22 % (ref 15–55)
IRON SERPL-MCNC: 91 UG/DL (ref 40–170)
TIBC SERPL-MCNC: 409 UG/DL (ref 250–450)
TREPONEMA PALLIDUM IGG+IGM AB [PRESENCE] IN SERUM OR PLASMA BY IMMUNOASSAY: NON REACTIVE

## 2018-06-05 PROCEDURE — 82728 ASSAY OF FERRITIN: CPT

## 2018-06-05 PROCEDURE — 83550 IRON BINDING TEST: CPT

## 2018-06-05 PROCEDURE — 86200 CCP ANTIBODY: CPT

## 2018-06-05 PROCEDURE — 82550 ASSAY OF CK (CPK): CPT

## 2018-06-05 PROCEDURE — 83540 ASSAY OF IRON: CPT

## 2018-06-05 PROCEDURE — 84207 ASSAY OF VITAMIN B-6: CPT

## 2018-06-05 PROCEDURE — 84446 ASSAY OF VITAMIN E: CPT

## 2018-06-05 PROCEDURE — 86780 TREPONEMA PALLIDUM: CPT

## 2018-06-05 PROCEDURE — 86235 NUCLEAR ANTIGEN ANTIBODY: CPT

## 2018-06-05 PROCEDURE — 87535 HIV-1 PROBE&REVERSE TRNSCRPJ: CPT

## 2018-06-05 PROCEDURE — 83516 IMMUNOASSAY NONANTIBODY: CPT | Mod: 91

## 2018-06-05 PROCEDURE — 36415 COLL VENOUS BLD VENIPUNCTURE: CPT

## 2018-06-06 LAB — CCP IGG SERPL-ACNC: 5 UNITS (ref 0–19)

## 2018-06-07 LAB
ENA SS-B IGG SER IA-ACNC: 0 AU/ML (ref 0–40)
SSA52 R0ENA AB IGG Q0420: 2 AU/ML (ref 0–40)
SSA60 R0ENA AB IGG Q0419: 6 AU/ML (ref 0–40)

## 2018-06-08 LAB
A-TOCOPHEROL VIT E SERPL-MCNC: 8.4 MG/L (ref 5.5–18)
BETA+GAMMA TOCOPHEROL SERPL-MCNC: 0.3 MG/L (ref 0–6)
HIV 1 PRO DNA BLD QL NAA+PROBE: NOT DETECTED
VIT B6 SERPL-MCNC: 44.1 NMOL/L (ref 20–125)

## 2018-06-12 LAB — TEST NAME 95000: NORMAL

## 2018-07-02 DIAGNOSIS — R79.0 LOW FERRITIN: ICD-10-CM

## 2018-07-23 ENCOUNTER — OFFICE VISIT (OUTPATIENT)
Dept: NEUROLOGY | Facility: MEDICAL CENTER | Age: 38
End: 2018-07-23
Payer: COMMERCIAL

## 2018-07-23 VITALS
OXYGEN SATURATION: 98 % | WEIGHT: 141 LBS | RESPIRATION RATE: 14 BRPM | BODY MASS INDEX: 24.07 KG/M2 | SYSTOLIC BLOOD PRESSURE: 120 MMHG | HEART RATE: 88 BPM | DIASTOLIC BLOOD PRESSURE: 68 MMHG | HEIGHT: 64 IN | TEMPERATURE: 97.4 F

## 2018-07-23 DIAGNOSIS — R20.2 NUMBNESS AND TINGLING IN LEFT HAND: ICD-10-CM

## 2018-07-23 DIAGNOSIS — G56.23 ULNAR NEUROPATHY OF BOTH UPPER EXTREMITIES: ICD-10-CM

## 2018-07-23 DIAGNOSIS — M54.12 CERVICAL RADICULOPATHY: ICD-10-CM

## 2018-07-23 DIAGNOSIS — E34.8 PINEAL GLAND CYST: ICD-10-CM

## 2018-07-23 DIAGNOSIS — D32.9 MENINGIOMA (HCC): ICD-10-CM

## 2018-07-23 DIAGNOSIS — M54.16 LUMBAR RADICULOPATHY: ICD-10-CM

## 2018-07-23 DIAGNOSIS — R20.2 NUMBNESS AND TINGLING OF RIGHT LOWER EXTREMITY: ICD-10-CM

## 2018-07-23 DIAGNOSIS — R20.0 NUMBNESS AND TINGLING IN LEFT HAND: ICD-10-CM

## 2018-07-23 DIAGNOSIS — R20.0 NUMBNESS AND TINGLING OF RIGHT LOWER EXTREMITY: ICD-10-CM

## 2018-07-23 PROCEDURE — 99215 OFFICE O/P EST HI 40 MIN: CPT | Performed by: PSYCHIATRY & NEUROLOGY
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
## 2018-07-27 ENCOUNTER — HOSPITAL ENCOUNTER (OUTPATIENT)
Dept: LAB | Facility: MEDICAL CENTER | Age: 38
End: 2018-07-27
Attending: INTERNAL MEDICINE
Payer: COMMERCIAL

## 2018-07-27 ENCOUNTER — NON-PROVIDER VISIT (OUTPATIENT)
Dept: NEUROLOGY | Facility: MEDICAL CENTER | Age: 38
End: 2018-07-27
Payer: COMMERCIAL

## 2018-07-27 ENCOUNTER — OFFICE VISIT (OUTPATIENT)
Dept: HEMATOLOGY ONCOLOGY | Facility: MEDICAL CENTER | Age: 38
End: 2018-07-27
Payer: COMMERCIAL

## 2018-07-27 VITALS
WEIGHT: 140.32 LBS | RESPIRATION RATE: 16 BRPM | SYSTOLIC BLOOD PRESSURE: 122 MMHG | DIASTOLIC BLOOD PRESSURE: 71 MMHG | TEMPERATURE: 98.8 F | OXYGEN SATURATION: 100 % | BODY MASS INDEX: 23.96 KG/M2 | HEIGHT: 64 IN | HEART RATE: 91 BPM

## 2018-07-27 DIAGNOSIS — R20.0 NUMBNESS AND TINGLING IN LEFT HAND: ICD-10-CM

## 2018-07-27 DIAGNOSIS — M54.12 CERVICAL RADICULOPATHY: ICD-10-CM

## 2018-07-27 DIAGNOSIS — G56.23 ULNAR NEUROPATHY OF BOTH UPPER EXTREMITIES: ICD-10-CM

## 2018-07-27 DIAGNOSIS — R20.2 NUMBNESS AND TINGLING OF RIGHT LOWER EXTREMITY: ICD-10-CM

## 2018-07-27 DIAGNOSIS — D50.0 IRON DEFICIENCY ANEMIA DUE TO CHRONIC BLOOD LOSS: ICD-10-CM

## 2018-07-27 DIAGNOSIS — R20.2 NUMBNESS AND TINGLING IN LEFT HAND: ICD-10-CM

## 2018-07-27 DIAGNOSIS — R20.0 NUMBNESS AND TINGLING OF RIGHT LOWER EXTREMITY: ICD-10-CM

## 2018-07-27 DIAGNOSIS — G56.20 ULNAR NEUROPATHY, UNSPECIFIED LATERALITY: ICD-10-CM

## 2018-07-27 DIAGNOSIS — M79.609 PAIN IN EXTREMITY, UNSPECIFIED EXTREMITY: ICD-10-CM

## 2018-07-27 DIAGNOSIS — M54.16 LUMBAR RADICULOPATHY: ICD-10-CM

## 2018-07-27 LAB
BASOPHILS # BLD AUTO: 0.9 % (ref 0–1.8)
BASOPHILS # BLD: 0.04 K/UL (ref 0–0.12)
BURR CELLS BLD QL SMEAR: NORMAL
DACRYOCYTES BLD QL SMEAR: NORMAL
EOSINOPHIL # BLD AUTO: 0.07 K/UL (ref 0–0.51)
EOSINOPHIL NFR BLD: 1.7 % (ref 0–6.9)
ERYTHROCYTE [DISTWIDTH] IN BLOOD BY AUTOMATED COUNT: 44.5 FL (ref 35.9–50)
FERRITIN SERPL-MCNC: 20.2 NG/ML (ref 10–291)
FOLATE SERPL-MCNC: >24 NG/ML
HCT VFR BLD AUTO: 42.1 % (ref 37–47)
HGB BLD-MCNC: 13.6 G/DL (ref 12–16)
IRON SATN MFR SERPL: 31 % (ref 15–55)
IRON SERPL-MCNC: 126 UG/DL (ref 40–170)
LYMPHOCYTES # BLD AUTO: 1.14 K/UL (ref 1–4.8)
LYMPHOCYTES NFR BLD: 27.8 % (ref 22–41)
MANUAL DIFF BLD: NORMAL
MCH RBC QN AUTO: 26.3 PG (ref 27–33)
MCHC RBC AUTO-ENTMCNC: 32.3 G/DL (ref 33.6–35)
MCV RBC AUTO: 81.3 FL (ref 81.4–97.8)
MONOCYTES # BLD AUTO: 0.11 K/UL (ref 0–0.85)
MONOCYTES NFR BLD AUTO: 2.6 % (ref 0–13.4)
NEUTROPHILS # BLD AUTO: 2.75 K/UL (ref 2–7.15)
NEUTROPHILS NFR BLD: 67 % (ref 44–72)
OVALOCYTES BLD QL SMEAR: NORMAL
PLATELET # BLD AUTO: 244 K/UL (ref 164–446)
PLATELET BLD QL SMEAR: NORMAL
PMV BLD AUTO: 9.6 FL (ref 9–12.9)
POIKILOCYTOSIS BLD QL SMEAR: NORMAL
RBC # BLD AUTO: 5.18 M/UL (ref 4.2–5.4)
RBC BLD AUTO: PRESENT
SCHISTOCYTES BLD QL SMEAR: NORMAL
TIBC SERPL-MCNC: 405 UG/DL (ref 250–450)
VIT B12 SERPL-MCNC: 803 PG/ML (ref 211–911)
WBC # BLD AUTO: 4.1 K/UL (ref 4.8–10.8)

## 2018-07-27 PROCEDURE — 82525 ASSAY OF COPPER: CPT

## 2018-07-27 PROCEDURE — 99213 OFFICE O/P EST LOW 20 MIN: CPT | Mod: 25 | Performed by: PSYCHIATRY & NEUROLOGY

## 2018-07-27 PROCEDURE — 95913 NRV CNDJ TEST 13/> STUDIES: CPT | Performed by: PSYCHIATRY & NEUROLOGY

## 2018-07-27 PROCEDURE — 82607 VITAMIN B-12: CPT

## 2018-07-27 PROCEDURE — 95886 MUSC TEST DONE W/N TEST COMP: CPT | Performed by: PSYCHIATRY & NEUROLOGY

## 2018-07-27 PROCEDURE — 99203 OFFICE O/P NEW LOW 30 MIN: CPT | Performed by: INTERNAL MEDICINE

## 2018-07-27 PROCEDURE — 82728 ASSAY OF FERRITIN: CPT

## 2018-07-27 PROCEDURE — 85007 BL SMEAR W/DIFF WBC COUNT: CPT

## 2018-07-27 PROCEDURE — 36415 COLL VENOUS BLD VENIPUNCTURE: CPT

## 2018-07-27 PROCEDURE — 83550 IRON BINDING TEST: CPT

## 2018-07-27 PROCEDURE — 85027 COMPLETE CBC AUTOMATED: CPT

## 2018-07-27 PROCEDURE — 82746 ASSAY OF FOLIC ACID SERUM: CPT

## 2018-07-27 PROCEDURE — 83540 ASSAY OF IRON: CPT

## 2018-07-27 ASSESSMENT — PAIN SCALES - GENERAL: PAINLEVEL: NO PAIN

## 2018-07-27 NOTE — PROGRESS NOTES
Consult Note: Hematology    Date of consultation: 7/27/2018     Referring provider: Guillermina Sailnas D.O.    Reason for consultation:   CC: low ferriton    History of presenting illness:   First seen in on 7/27/2018  Nubia Arce  is a 37 y.o. year old female seen in clinic today for low ferritin seen on labs June 5. She denies any acute complaints, but has been seeing her neurologist for numbness in her hand.She was seen to have low ferritin on routine blood work and is referred to hematology for further evaluation      Review of Systems:  Constitutional: No fever, chills, weight loss ,malaise/fatigue.      All other review of systems are negative except what was mentioned above in the HPI.    Past Medical History:    Past Medical History:   Diagnosis Date   • Allergy    • Depression    • HLA B27 (HLA B27 positive)    • MTHFR gene mutation (HCC)    • Psoriatic arthritis (HCC)        Past surgical history:    Past Surgical History:   Procedure Laterality Date   • PRIMARY C SECTION         Allergies:  Doxycycline    Medications:    Current Outpatient Prescriptions   Medication Sig Dispense Refill   • Turmeric 450 MG Cap Take  by mouth.     • sertraline (ZOLOFT) 25 MG tablet Take 25 mg by mouth every day.     • omeprazole (PRILOSEC) 20 MG delayed-release capsule Take 1 Cap by mouth every day. 90 Cap 3   • Omega-3 Fatty Acids (FISH OIL) 1000 MG Cap capsule Take 1,000 mg by mouth 3 times a day, with meals.     • zinc sulfate 1 MG/ML Solution by Intravenous route.     • vitamin D (CHOLECALCIFEROL) 1000 UNIT Tab Take 6,000 Units by mouth every day.     • cyanocobalamin (VITAMIN B-12) 500 MCG Tab Take 500 mcg by mouth every day.       No current facility-administered medications for this visit.        Social History:     Social History     Social History   • Marital status:      Spouse name: N/A   • Number of children: N/A   • Years of education: N/A     Occupational History   • Not on file.     Social History  "Main Topics   • Smoking status: Never Smoker   • Smokeless tobacco: Never Used   • Alcohol use Yes      Comment: Socially   • Drug use: No   • Sexual activity: Yes     Partners: Male     Birth control/ protection: Condom     Other Topics Concern   • Not on file     Social History Narrative   • No narrative on file       Family History:     Family History   Problem Relation Age of Onset   • Psychiatry Mother         depression   • Hypertension Mother    • Arthritis Father         psoriatic   • Hypertension Father    • Psychiatry Father         depression   • Cancer Maternal Grandmother    • Cancer Maternal Grandfather         stomach   • Other Son         speech apraxia       Physical Exam:  Vitals:   /71   Pulse 91   Temp 37.1 °C (98.8 °F)   Resp 16   Ht 1.626 m (5' 4\")   Wt 63.6 kg (140 lb 5.2 oz)   SpO2 100%   BMI 24.09 kg/m²     General: Not in acute distress,   HEENT: No pallor, icterus. Oropharynx clear.   Neck: Supple, no palpable masses.  Lymph nodes: No palpable cervical, supraclavicular, axillary or inguinal lymphadenopathy.    CVS: regular rate and rhythm, no rubs or gallops  RESP: Clear to auscultate bilaterally, no wheezing or crackles.   ABD: Soft, non tender, non distended, positive bowel sounds, no palpable organomegaly  EXT: No edema or cyanosis  CNS: Alert and oriented x3, No focal deficits.  Skin- No rash      Labs:   No visits with results within 2 Week(s) from this visit.   Latest known visit with results is:   Hospital Outpatient Visit on 06/05/2018   Component Date Value Ref Range Status   • CPK Total 06/05/2018 32  0 - 154 U/L Final   • Vitamin B6 06/05/2018 44.1  20.0 - 125.0 nmol/L Final    Comment: INTERPRETIVE INFORMATION: Vitamin B6 (Pyridoxal 5-Phosphate)  Pyridoxal 5'-phosphate measured in a specimen collected following  an 8-hour or overnight fast accurately indicates vitamin B6  nutritional status. Non-fasting specimen concentration reflects  recent vitamin intake.  Test " "developed and characteristics determined by [facility]. See Compliance Statement B: [employer]  Performed by [facility],  [address] [facility] [address] [phone]  www.[employer], Moreno Schilling MD - Lab. Director     • HIV Dna By Pcr 06/05/2018 Not Detected  Not Detected Final    Comment: INTERPRETIVE INFORMATION: HIV-1 PCR, Qualitative  This test detects human immunodeficiency virus type 1 (HIV-1) DNA  and RNA. The assay methodology is polymerase chain reaction (PCR)  using the STONE AmpliPrep/STONE TaqMan HIV-1 Qual Test. This test  is optimized to yield equivalent amplification of Group M subtypes  of HIV-1.  A result of \"Not Detected\" does not rule out HIV-1 nucleic acid  concentrations below the limit of detection of the assay or the  presence of PCR inhibitors in the patient specimen. Improper  specimen handling can cause false negatives. PCR may not detect  infection in the first months of life. The diagnosis of HIV-1  infection should be made based on clinical presentation and  results from additional diagnostic tests.  Diagnosis should not be  made based solely on a single HIV-1 test. False positives can be  caused by PCR contamination.  This assay should not be used for blood donor screening,  associated re-entry protocols, or for screening Human Cell,  Tissue                           s and Cellular Tissue-Based Products (HCT/P).  Test developed and characteristics determined by [facility]. See Compliance Statement D: [employer]  Performed by [facility],  [address] [place] [address] [phone]  www.[employer], Moreno Schilling MD - Lab. Director     • Alpha-Tocopherol (Vit E) 06/05/2018 8.4  5.5 - 18.0 mg/L Final    Comment: Test developed and characteristics determined by [facility]. See Compliance Statement B: [employer]     • Gamma-Tocopherol (Vit E) 06/05/2018 0.3  0.0 - 6.0 mg/L Final    Comment: Performed by [facility],  [address] [place] [address]" [place] [address] [phone]  www.[employer], Moreno Schilling MD - Lab. Director     • Iron 06/05/2018 91  40 - 170 ug/dL Final   • Total Iron Binding 06/05/2018 409  250 - 450 ug/dL Final   • % Saturation 06/05/2018 22  15 - 55 % Final   • Ferritin 06/05/2018 8.6* 10.0 - 291.0 ng/mL Final   • Syphilis, Treponemal Qual 06/05/2018 Non Reactive  Non Reactive Final    Comment: Result of Non-reactive indicates no serologic evidence of  infection with Treponema pallidum.  (Incubating or early  primary syphilis cannot be excluded).     • SSA 52 (R0)(RAMOS) Ab, IgG 06/05/2018 2  0 - 40 AU/mL Final    Comment: INTERPRETIVE INFORMATION: SSA-52 (Ro52) (RAMOS) Antibody, IgG  29 AU/mL or Less ............. Negative  30 - 40 AU/mL ................ Equivocal  41 AU/mL or Greater .......... Positive  SSA-52 (Ro52) and/or SSA-60 (Ro60) antibodies are associated with  a diagnosis of Sjogren syndrome, systemic lupus erythematosus  (SLE), and systemic sclerosis. SSA-52 antibody overlaps  significantly with the major SSc-related antibodies. SSA-52 (Ro52)  antibody occurs frequently in patients with inflammatory  myopathies, often in the presence of interstitial lung disease.     • SSA 60 (R0)(RAMOS) Ab, IgG 06/05/2018 6  0 - 40 AU/mL Final    Comment: REFERENCE INTERVAL: SSA-60 (Ro60) (RAMOS) Antibody, IgG  29 AU/mL or Less ............. Negative  30 - 40 AU/mL ................ Equivocal  41 AU/mL or Greater .......... Positive     • Sjogren'S Anti-Ss-B 06/05/2018 0  0 - 40 AU/mL Final    Comment: INTERPRETIVE INFORMATION: SSB (La) (RAMOS) Ab, IgG  29 AU/mL or Less ............. Negative  30 - 40 AU/mL ................ Equivocal  41 AU/mL or Greater .......... Positive  SSB (La) antibody is seen in 50-60% of Sjogren syndrome cases and  is specific if it is the only RAMOS antibody present. 15-25% of  patients with systemic lupus erythematosus (SLE) and 5-10% of  patients with progressive systemic sclerosis (PSS) also have  this  antibody.  Performed by [facility],  [address] [facility] [address] [phone]  www.[employer], Moreno Schilling MD - Lab. Director     • [facility]. Lab Rslt 06/05/2018 SEE NOTE   Final    Comment: Test name                     Result Flag Units  RefIntvl  -------------------------------------------------------------  MAG Antibody, IgM Marlys           58          TU 0-999  INTERPRETIVE INFORMATION: MAG Antibody, IgM MARLYS  An elevated IgM antibody concentration greater than 999 TU against  myelin-associated glycoprotein (MAG) suggests active demyelination  in peripheral neuropathy. A normal concentration (less than 999  TU) generally rules out an anti-MAG antibody-associated peripheral  neuropathy.  TU=Titer Units  Test developed and characteristics determined by [facility]. See Compliance Statement D: [employer]  SGPG Antibody, IgM              0.08          IV 0.00-0.99  INTERPRETIVE INFORMATION: SGPG Antibody, IgM  The majority of sulfate-3-glucuronyl paragloboside (SGPG)  IgM-positive  sera will show reactivity against MAG. Patients who are SGPG IgM  positive and MAG IgM negative may have multi-focal motor neuropathy  with conduction block.  Test developed and betty                           racteristics determined by [facility]. See Compliance Statement D: [employer]  Performed by [facility],  [address] [facility] [place] [address] [phone]  www.[employer], Moreno Schilling MD - Lab. Director     • Ccp Antibodies 06/05/2018 5  0 - 19 Units Final    Comment: INTERPRETIVE INFORMATION: Cyclic Citrullinated Peptide Antibody,  IgG  19 Units or less ................... Negative  20-39 Units ........................ Weak Positive  40-59 Units ........................ Moderate Positive  60 Units or greater ................ Strong Positive  Anti-cyclic citrullinated peptide (anti-CCP), IgG antibodies are  present in about 69-83 percent of patients with rheumatoid  arthritis (RA) and have  specificities of 93-95 percent. These  autoantibodies may be present in the preclinical phase of disease,  are associated with future RA development, and may predict  radiographic joint destruction. Patients with weak positive  results should be monitored and testing repeated.  Performed by [facility],  [address] [place] [address] [phone]  www.[email or web address], Moreno Schilling MD - Lab. Director           Imaging:    Assessment and Plan:  -low ferritin  -check CBC today  -she has been taking iron supplements for the alst 6 weeks.will recheck iron levels  -Follow up with PCP, return to hematology clinic as needed. Patient will be called with the results of blood work      She agreed and verbalized her agreement and understanding with the current plan.  I answered all questions and concerns she has at this time.  Dear  Guillermina Salinas, D.O.,  Thank you for allowing me to participate in her care.    All labs and/or imaging studies mentioned in the note above were reviewed with and explained to the patient as a pertain to medical decision making.    Please note that this dictation was created using voice recognition software. I have made every reasonable attempt to correct obvious errors, but I expect that there are errors of grammar and possibly content that I did not discover before finalizing the note.      SIGNATURES:  Sharonda Strauss    CC:  ETTA Barbosa Justine D, D.O.

## 2018-07-28 NOTE — PROGRESS NOTES
NERVE CONDUCTION STUDIES AND ELECTROMYOGRAPHY REPORT        DOS: 07/27/18      Referring provider: Guillermina Salinas D.O.      SUMMARY OF PATIENT'S CLINICAL HISTORY, AND RATIONALE FOR TESTING:    Ms. Nubia Arce 37 y.o. presenting with progression of widespread paresthesias, left arm pain, history of possible right peroneal neuropathy. Studies done for work up off possible peripheral polyneuropathy, left cervical radiculopathy, right lumbar radiculopathy, and/or right peroneal neuropathy.     Past Medical History is significant for :   Past Medical History:   Diagnosis Date   • Allergy    • Depression    • HLA B27 (HLA B27 positive)    • MTHFR gene mutation (HCC)    • Psoriatic arthritis (HCC)        ELECTRODIAGNOSTIC EXAMINATION:  Nerve conduction studies (NCS) and electromyography (EMG) are utilized to evaluate direct or indirect damage to the peripheral nervous system. NCS are performed to measure the nerve(s) response(s) to electrostimulation across a given nerve segment. EMG evaluates the passive and active electrical activity of the muscle(s) in question.  Muscles are innervated by specific peripheral nerves and roots. Often times, several nerves the muscle to be examined in order to determine the presence or absence of the disease process. Furthermore, nerves and muscles may need to be tested in a [id number] comparison, as well as in additional extremities, as this may be crucial in characterizing the extent of the disease process, which may be diffuse or isolated and of varying degree of severity. The extent of the neurodiagnostic exam is justified as it may help arrive to a proper diagnosis, which ultimately may contribute to better management of the patient. Therefore, the nerves to muscles examined during the study were medically necessary.    Unless otherwise noted, temperature of the extremity(s) study was monitored before and during the examination and remained between 32 and 36 degrees C for  the upper extremities, and between 30 and 36 degrees C for the lower extremities.      NERVE CONDUCTION STUDIES:  Sensory nerves:   - Bilateral Median sensory nerves were examined.The responses were within normal limits.  - Bilateral Ulnar sensory nerves were examined. The response was within normal limits on the right ulnar nerve. However, the left ulnar nerve showed a mildly low conduction velocity.  - Bilateral Sural nerves were tested. The responses were within normal limits.    Motor nerves:   - Bilateral Median motor nerves were examined. Recording electrodes placed at the Abductor Pollicis Brevis muscles. The responses were within normal limits.  - Bilateral Ulnar motor nerves were examined. Recording electrodes placed at the Abductor Digiti Minimi muscles. The responses were within normal limits on the right ulnar nerve. The responses on the left ulnar nerve exhibited a mildly decreased compound motor action potential amplitude.  - Bilateral Tibial nerves were examined. Recording electrodes placed at the Abductor Hallucis muscles. The responses were within normal limits.  - Bilateral Deep Peroneal motor nerves were examined. Recording electrodes were placed at the Extensor Digitorum Brevis muscles.The responses were within normal limits.     No temporal dispersion or conduction block observed in any of the motor nerves examined.    LATE RESPONSES:  F waves were assessed in the following nerves: bilateral median, bilateral ulnar, bilateral tibial, and bilateral peroneal.   The responses within normal limits for the patient's age.        ELECTROMYOGRAPHY:  The study was performed the concentric needle electrode. Fibrillation and fasciculation activity is graded by convention from none (0) to continuous (4+).  Needle electrode examination was performed in the following muscles: left deltoid, left biceps, left triceps, left abductor digiti minimi, left abductor pollicis brevis, right vastus lateralis, right  tibialis anterior, right medial gastrocnemius, right extensor digitorum brevis and right abductor hallucis.   The muscles tested demonstrated normal inserctional activity, normal motor unit morphology and recruitment. There were no elements suggestive of active denervation.     The patient tolerated testing well, without any complications.         Nerve Conduction Studies      Stim Site NR Peak (ms) Norm Peak (ms) O-P Amp (µV) Norm O-P Amp Site1 Site2 Delta-P (ms) Dist (cm) Taras (m/s) Norm Taras (m/s)   Left Median Anti Sensory (2nd Digit)  35.2°C   Wrist    2.7 <3.4 68.6 >20 Wrist 2nd Digit 2.7 14.0 52 >50   Right Median Anti Sensory (2nd Digit)  35.2°C   Wrist    2.8 <3.4 78.2 >20 Wrist 2nd Digit 2.8 14.0 50 >50   Left Sup Fibular Anti Sensory (Ant Lat Mall)  35.2°C   14 cm    3.6 <4.4 5.2 >5.0 14 cm Ant Lat Mall 3.6 14.0 39 >32   Right Sup Fibular Anti Sensory (Ant Lat Mall)  35.2°C   14 cm    3.3 <4.4 6.2 >5.0 14 cm Ant Lat Mall 3.3 14.0 42 >32   Left Sural Anti Sensory (Lat Mall)   Calf    3.4   7.9 >5 Calf Lat Mall 3.4 14.0 41 >40   Right Sural Anti Sensory (Lat Mall)   Calf    3.0   12.6 >5 Calf Lat Mall 3.0 14.0 47 >40        3.3   14.4                 Left Ulnar Anti Sensory (5th Digit)  35.2°C   Wrist    3.1 >3.1 55.8 >12 Wrist 5th Digit 3.1 14.0 *45 >50   Right Ulnar Anti Sensory (5th Digit)  35.2°C   Wrist    2.8 <3.1 48.0 >12 Wrist 5th Digit 2.8 14.0 50 >50          Stim Site NR Onset (ms) Norm Onset (ms) O-P Amp (mV) Norm O-P Amp Site1 Site2 Delta-0 (ms) Dist (cm) Taras (m/s) Norm Taras (m/s)   Left Median Motor (Abd Poll Brev)  35.2°C   Wrist    3.0 <3.9 12.2 >6 Elbow Wrist 3.8 26.0 68 >50   Elbow    6.8   12.2                 Right Median Motor (Abd Poll Brev)   Wrist    3.4 <3.9 12.8 >6 Elbow Wrist 3.9 22.5 58 >50   Elbow    7.3   12.5                 Left Peroneal EDB Motor (Ext Dig Brev)   Ankle    4.0 <5.5 6.6 >3.0 B Fib Ankle 5.9 31.0 53 >40   B Fib    9.9   6.1   Poplt B Fib 1.7 10.0 59     Poplt     11.6   6.0                 Right Peroneal EDB Motor (Ext Dig Brev)   Ankle    3.4 <5.5 5.3 >3.0 B Fib Ankle 6.8 35.0 51 >40   B Fib    10.2   4.8   Poplt B Fib 2.0 10.0 50     Poplt    12.2   4.7                 Left Tibial Motor (Abd Lopez Brev)   Ankle    3.5 <6 12.3 >8 Knee Ankle 7.7 37.0 48 >40   Knee    11.2   9.0                 Right Tibial Motor (Abd Lopez Brev)   Ankle    3.4 <6 14.0 >8 Knee Ankle 7.9 41.0 52 >40   Knee    11.3   8.2                 Left Ulnar Motor (Abd Dig Min)  35.2°C   Wrist    2.7 <3.1 *6.6 >7 B Elbow Wrist 2.7 18.0 67 >50   B Elbow    5.4   5.7   A Elbow B Elbow 1.6 10.0 63     A Elbow    7.0   5.4                 Right Ulnar Motor (Abd Dig Min)  35.2°C   Wrist    2.8 <3.1 11.0 >7 B Elbow Wrist 2.7 18.5 69 >50   B Elbow    5.5   8.7   A Elbow B Elbow 1.6 10.0 63     A Elbow    7.1   7.7                    F Wave Studies      NR F-Lat (ms) Lat Norm (ms)   Left Median (Abd Poll Brev)  35.2°C      23.95 <31   Right Median (Abd Poll Brev)      24.22 <31   Left Peroneal EDB (Ext Dig Brev)      44.38 <57   Right Peroneal EDB (Ext Dig Brev)      48.12 <57   Left Tibial (Abd Hallucis)      48.75 <57   Right Tibial (Abd Hallucis)      46.73 <57   Left Ulnar (Abd Dig Min)  35.2°C      23.98 <32   Right Ulnar (Abd Dig Min)  35.2°C      23.05 <32                                                                            Electromyography      Side Muscle Nerve Root Ins Act Fibs Psw Amp Dur Poly Recrt Int Pat Comment   Left Deltoid Axillary C5-6 Nml Nml Nml Nml Nml 0 Nml Nml     Left Biceps Musculocut C5-6 Nml Nml Nml Nml Nml 0 Nml Nml     Left Triceps Radial C6-7-8 Nml Nml Nml Nml Nml 0 Nml Nml     Left Abd Poll Brev Median C8-T1 Nml Nml Nml Nml Nml 0 Nml Nml     Left Abd Dig Min Ulnar C8-T1 Nml Nml Nml Nml Nml 0 Nml Nml     Right VastusLat Femoral L2-4 Nml Nml Nml Nml Nml 0 Nml Nml     Right AntTibialis Dp Br Fibular L4-5 Nml Nml Nml Nml Nml 0 Nml Nml     Right Gastroc Tibial S1-2 Nml Nml Nml Nml  Nml 0 Nml Nml     Right Ext Dig Brev Dp Br Fibular L5, S1 Nml Nml Nml Nml Nml 0 Nml Nml     Right AbdHallucis MedPlantar S1-2 Nml Nml Nml Nml Nml 0 Nml Nml               DIAGNOSTIC INTERPRETATION:   Extensive electrodiagnostic studies were performed to the all four extremities. The studies were abnormal.       DISCUSSION:   1)-Moderate left ulnar neuropathy, not localizable.     There is no electrodiagnostic suggestion for a widespread peripheral polyneuropathy, at least not affecting medium and large nerve fibers.     There is no electrodiagnostic suggestion for a right peroneal neuropathy.     There is no electrodiagnostic suggestion for either a left cervical or right lumbosacral radiculopathy, however a normal study does not fully rule those possibilities out.       PLAN:   I had a discussion with the patient about the results.   In regards to the left ulnar neuropathy, I recommended she avoids compression at the elbow, as this is typically the site most commonly affected. If symptoms in left hand progressed over the next few months, a consideration will be a hand surgeon consult for possible decompression.   There is no evidence of a widespread polyneuropathy, at least not affecting large and medium size nerve fibers. This test does not assess for small fibers. We discussed the possibility of small fiber neuropathy, including testing for it with skin biopsies, as well as possible risk with procedures. I, however do not favor her presentation as a small fiber neuropathy, given that she lacks most of these symptoms. This has been frustrating for her, as she has no answer yet to her progressive numbness. Her CSF did show elevated protein. She is scheduled to follow up with Dr. Salinas and will discuss possibly repeating LP at some time in the future as well as follow up with imaging. She has decided not to proceed with skin biopsies for small fiber neuropathy for the time, but will let me know if she changes her  mind in the future.     I spent, in addition from testing, another 20 minutes discussing results, prognosis, and further diagnosis recommendations with the patient.         Edmond Delacruz MD  Medical Director, Epilepsy and Neurodiagnostics.   Clinical  of Neurology [facility].   Diplomate in Neurology, Epilepsy, and Electrodiagnostic Medicine.   Office: [phone]  Fax: [phone]

## 2018-07-28 NOTE — PROCEDURES
NERVE CONDUCTION STUDIES AND ELECTROMYOGRAPHY REPORT           DOS: 07/27/18        Referring provider: Guillermina Salinas D.O.        SUMMARY OF PATIENT'S CLINICAL HISTORY, AND RATIONALE FOR TESTING:     Ms. Nubia Arce 37 y.o. presenting with progression of widespread paresthesias, left arm pain, history of possible right peroneal neuropathy. Studies done for work up off possible peripheral polyneuropathy, left cervical radiculopathy, right lumbar radiculopathy, and/or right peroneal neuropathy.      Past Medical History is significant for :   Past Medical History        Past Medical History:   Diagnosis Date   • Allergy     • Depression     • HLA B27 (HLA B27 positive)     • MTHFR gene mutation (HCC)     • Psoriatic arthritis (HCC)              ELECTRODIAGNOSTIC EXAMINATION:  Nerve conduction studies (NCS) and electromyography (EMG) are utilized to evaluate direct or indirect damage to the peripheral nervous system. NCS are performed to measure the nerve(s) response(s) to electrostimulation across a given nerve segment. EMG evaluates the passive and active electrical activity of the muscle(s) in question.  Muscles are innervated by specific peripheral nerves and roots. Often times, several nerves the muscle to be examined in order to determine the presence or absence of the disease process. Furthermore, nerves and muscles may need to be tested in a [id number] comparison, as well as in additional extremities, as this may be crucial in characterizing the extent of the disease process, which may be diffuse or isolated and of varying degree of severity. The extent of the neurodiagnostic exam is justified as it may help arrive to a proper diagnosis, which ultimately may contribute to better management of the patient. Therefore, the nerves to muscles examined during the study were medically necessary.     Unless otherwise noted, temperature of the extremity(s) study was monitored before and during the  examination and remained between 32 and 36 degrees C for the upper extremities, and between 30 and 36 degrees C for the lower extremities.        NERVE CONDUCTION STUDIES:  Sensory nerves:   - Bilateral Median sensory nerves were examined.The responses were within normal limits.  - Bilateral Ulnar sensory nerves were examined. The response was within normal limits on the right ulnar nerve. However, the left ulnar nerve showed a mildly low conduction velocity.  - Bilateral Sural nerves were tested. The responses were within normal limits.     Motor nerves:   - Bilateral Median motor nerves were examined. Recording electrodes placed at the Abductor Pollicis Brevis muscles. The responses were within normal limits.  - Bilateral Ulnar motor nerves were examined. Recording electrodes placed at the Abductor Digiti Minimi muscles. The responses were within normal limits on the right ulnar nerve. The responses on the left ulnar nerve exhibited a mildly decreased compound motor action potential amplitude.  - Bilateral Tibial nerves were examined. Recording electrodes placed at the Abductor Hallucis muscles. The responses were within normal limits.  - Bilateral Deep Peroneal motor nerves were examined. Recording electrodes were placed at the Extensor Digitorum Brevis muscles.The responses were within normal limits.      No temporal dispersion or conduction block observed in any of the motor nerves examined.     LATE RESPONSES:  F waves were assessed in the following nerves: bilateral median, bilateral ulnar, bilateral tibial, and bilateral peroneal.   The responses within normal limits for the patient's age.           ELECTROMYOGRAPHY:  The study was performed the concentric needle electrode. Fibrillation and fasciculation activity is graded by convention from none (0) to continuous (4+).  Needle electrode examination was performed in the following muscles: left deltoid, left biceps, left triceps, left abductor digiti minimi,  left abductor pollicis brevis, right vastus lateralis, right tibialis anterior, right medial gastrocnemius, right extensor digitorum brevis and right abductor hallucis.   The muscles tested demonstrated normal inserctional activity, normal motor unit morphology and recruitment. There were no elements suggestive of active denervation.     The patient tolerated testing well, without any complications.            Nerve Conduction Studies      Stim Site NR Peak (ms) Norm Peak (ms) O-P Amp (µV) Norm O-P Amp Site1 Site2 Delta-P (ms) Dist (cm) Taras (m/s) Norm Taras (m/s)   Left Median Anti Sensory (2nd Digit)  35.2°C   Wrist    2.7 <3.4 68.6 >20 Wrist 2nd Digit 2.7 14.0 52 >50   Right Median Anti Sensory (2nd Digit)  35.2°C   Wrist    2.8 <3.4 78.2 >20 Wrist 2nd Digit 2.8 14.0 50 >50   Left Sup Fibular Anti Sensory (Ant Lat Mall)  35.2°C   14 cm    3.6 <4.4 5.2 >5.0 14 cm Ant Lat Mall 3.6 14.0 39 >32   Right Sup Fibular Anti Sensory (Ant Lat Mall)  35.2°C   14 cm    3.3 <4.4 6.2 >5.0 14 cm Ant Lat Mall 3.3 14.0 42 >32   Left Sural Anti Sensory (Lat Mall)   Calf    3.4   7.9 >5 Calf Lat Mall 3.4 14.0 41 >40   Right Sural Anti Sensory (Lat Mall)   Calf    3.0   12.6 >5 Calf Lat Mall 3.0 14.0 47 >40        3.3   14.4                 Left Ulnar Anti Sensory (5th Digit)  35.2°C   Wrist    3.1 >3.1 55.8 >12 Wrist 5th Digit 3.1 14.0 *45 >50   Right Ulnar Anti Sensory (5th Digit)  35.2°C   Wrist    2.8 <3.1 48.0 >12 Wrist 5th Digit 2.8 14.0 50 >50          Stim Site NR Onset (ms) Norm Onset (ms) O-P Amp (mV) Norm O-P Amp Site1 Site2 Delta-0 (ms) Dist (cm) Taras (m/s) Norm Taras (m/s)   Left Median Motor (Abd Poll Brev)  35.2°C   Wrist    3.0 <3.9 12.2 >6 Elbow Wrist 3.8 26.0 68 >50   Elbow    6.8   12.2                 Right Median Motor (Abd Poll Brev)   Wrist    3.4 <3.9 12.8 >6 Elbow Wrist 3.9 22.5 58 >50   Elbow    7.3   12.5                 Left Peroneal EDB Motor (Ext Dig Brev)   Ankle    4.0 <5.5 6.6 >3.0 B Fib Ankle 5.9 31.0 53  >40   B Fib    9.9   6.1   Poplt B Fib 1.7 10.0 59     Poplt    11.6   6.0                 Right Peroneal EDB Motor (Ext Dig Brev)   Ankle    3.4 <5.5 5.3 >3.0 B Fib Ankle 6.8 35.0 51 >40   B Fib    10.2   4.8   Poplt B Fib 2.0 10.0 50     Poplt    12.2   4.7                 Left Tibial Motor (Abd Lopez Brev)   Ankle    3.5 <6 12.3 >8 Knee Ankle 7.7 37.0 48 >40   Knee    11.2   9.0                 Right Tibial Motor (Abd Lopez Brev)   Ankle    3.4 <6 14.0 >8 Knee Ankle 7.9 41.0 52 >40   Knee    11.3   8.2                 Left Ulnar Motor (Abd Dig Min)  35.2°C   Wrist    2.7 <3.1 *6.6 >7 B Elbow Wrist 2.7 18.0 67 >50   B Elbow    5.4   5.7   A Elbow B Elbow 1.6 10.0 63     A Elbow    7.0   5.4                 Right Ulnar Motor (Abd Dig Min)  35.2°C   Wrist    2.8 <3.1 11.0 >7 B Elbow Wrist 2.7 18.5 69 >50   B Elbow    5.5   8.7   A Elbow B Elbow 1.6 10.0 63     A Elbow    7.1   7.7                    F Wave Studies      NR F-Lat (ms) Lat Norm (ms)   Left Median (Abd Poll Brev)  35.2°C      23.95 <31   Right Median (Abd Poll Brev)      24.22 <31   Left Peroneal EDB (Ext Dig Brev)      44.38 <57   Right Peroneal EDB (Ext Dig Brev)      48.12 <57   Left Tibial (Abd Hallucis)      48.75 <57   Right Tibial (Abd Hallucis)      46.73 <57   Left Ulnar (Abd Dig Min)  35.2°C      23.98 <32   Right Ulnar (Abd Dig Min)  35.2°C      23.05 <32                                                                            Electromyography      Side Muscle Nerve Root Ins Act Fibs Psw Amp Dur Poly Recrt Int Pat Comment   Left Deltoid Axillary C5-6 Nml Nml Nml Nml Nml 0 Nml Nml     Left Biceps Musculocut C5-6 Nml Nml Nml Nml Nml 0 Nml Nml     Left Triceps Radial C6-7-8 Nml Nml Nml Nml Nml 0 Nml Nml     Left Abd Poll Brev Median C8-T1 Nml Nml Nml Nml Nml 0 Nml Nml     Left Abd Dig Min Ulnar C8-T1 Nml Nml Nml Nml Nml 0 Nml Nml     Right VastusLat Femoral L2-4 Nml Nml Nml Nml Nml 0 Nml Nml     Right AntTibialis Dp Br Fibular L4-5 Nml Nml Nml  Nml Nml 0 Nml Nml     Right Gastroc Tibial S1-2 Nml Nml Nml Nml Nml 0 Nml Nml     Right Ext Dig Brev Dp Br Fibular L5, S1 Nml Nml Nml Nml Nml 0 Nml Nml     Right AbdHallucis MedPlantar S1-2 Nml Nml Nml Nml Nml 0 Nml Nml                 DIAGNOSTIC INTERPRETATION:   Extensive electrodiagnostic studies were performed to the all four extremities. The studies were abnormal.         DISCUSSION:   1)-Moderate left ulnar neuropathy, not localizable.      There is no electrodiagnostic suggestion for a widespread peripheral polyneuropathy, at least not affecting medium and large nerve fibers.      There is no electrodiagnostic suggestion for a right peroneal neuropathy.      There is no electrodiagnostic suggestion for either a left cervical or right lumbosacral radiculopathy, however a normal study does not fully rule those possibilities out.         PLAN:   I had a discussion with the patient about the results.   In regards to the left ulnar neuropathy, I recommended she avoids compression at the elbow, as this is typically the site most commonly affected. If symptoms in left hand progressed over the next few months, a consideration will be a hand surgeon consult for possible decompression.   There is no evidence of a widespread polyneuropathy, at least not affecting large and medium size nerve fibers. This test does not assess for small fibers. We discussed the possibility of small fiber neuropathy, including testing for it with skin biopsies, as well as possible risk with procedures. I, however do not favor her presentation as a small fiber neuropathy, given that she lacks most of these symptoms. This has been frustrating for her, as she has no answer yet to her progressive numbness. Her CSF did show elevated protein. She is scheduled to follow up with Dr. Salinas and will discuss possibly repeating LP at some time in the future as well as follow up with imaging. She has decided not to proceed with skin biopsies for small  fiber neuropathy for the time, but will let me know if she changes her mind in the future.      I spent, in addition from testing, another 20 minutes discussing results, prognosis, and further diagnosis recommendations with the patient.            Edmond Delacruz MD  Medical Director, Epilepsy and Neurodiagnostics.   Clinical  of Neurology [facility].   Diplomate in Neurology, Epilepsy, and Electrodiagnostic Medicine.   Office: [phone]  Fax: [phone]      MOIZ UGARTE    DD:  07/27/2018 17:29:19  DT:  07/27/2018 17:33:47    D#:  [id number]  Job#:  [id number]

## 2018-07-31 LAB — COPPER SERPL-MCNC: 116 UG/DL (ref 80–155)

## 2018-08-20 ENCOUNTER — HOSPITAL ENCOUNTER (OUTPATIENT)
Dept: RADIOLOGY | Facility: MEDICAL CENTER | Age: 38
End: 2018-08-20
Attending: OBSTETRICS & GYNECOLOGY
Payer: COMMERCIAL

## 2018-08-20 DIAGNOSIS — N93.8 DYSFUNCTIONAL UTERINE BLEEDING: ICD-10-CM

## 2018-08-20 PROCEDURE — 76830 TRANSVAGINAL US NON-OB: CPT

## 2018-10-15 ENCOUNTER — PATIENT MESSAGE (OUTPATIENT)
Dept: MEDICAL GROUP | Facility: PHYSICIAN GROUP | Age: 38
End: 2018-10-15

## 2018-10-23 ENCOUNTER — APPOINTMENT (OUTPATIENT)
Dept: NEUROLOGY | Facility: MEDICAL CENTER | Age: 38
End: 2018-10-23
Payer: COMMERCIAL

## 2018-10-24 ENCOUNTER — APPOINTMENT (OUTPATIENT)
Dept: MEDICAL GROUP | Facility: PHYSICIAN GROUP | Age: 38
End: 2018-10-24
Payer: COMMERCIAL

## 2018-11-07 ENCOUNTER — OFFICE VISIT (OUTPATIENT)
Dept: MEDICAL GROUP | Facility: PHYSICIAN GROUP | Age: 38
End: 2018-11-07
Payer: COMMERCIAL

## 2018-11-07 VITALS
RESPIRATION RATE: 16 BRPM | HEIGHT: 64 IN | WEIGHT: 143 LBS | OXYGEN SATURATION: 98 % | TEMPERATURE: 97.7 F | SYSTOLIC BLOOD PRESSURE: 118 MMHG | HEART RATE: 81 BPM | DIASTOLIC BLOOD PRESSURE: 84 MMHG | BODY MASS INDEX: 24.41 KG/M2

## 2018-11-07 DIAGNOSIS — R59.0 LYMPHADENOPATHY OF LEFT CERVICAL REGION: ICD-10-CM

## 2018-11-07 DIAGNOSIS — N80.00 UTERUS, ADENOMYOSIS: ICD-10-CM

## 2018-11-07 DIAGNOSIS — K21.9 GASTROESOPHAGEAL REFLUX DISEASE, ESOPHAGITIS PRESENCE NOT SPECIFIED: ICD-10-CM

## 2018-11-07 DIAGNOSIS — L40.50 PSORIATIC ARTHRITIS (HCC): ICD-10-CM

## 2018-11-07 DIAGNOSIS — R10.12 LEFT UPPER QUADRANT PAIN: ICD-10-CM

## 2018-11-07 DIAGNOSIS — D32.9 MENINGIOMA (HCC): ICD-10-CM

## 2018-11-07 DIAGNOSIS — E03.9 ACQUIRED HYPOTHYROIDISM: ICD-10-CM

## 2018-11-07 DIAGNOSIS — F33.8 SEASONAL DEPRESSION (HCC): ICD-10-CM

## 2018-11-07 PROCEDURE — 99214 OFFICE O/P EST MOD 30 MIN: CPT | Performed by: NURSE PRACTITIONER

## 2018-11-07 RX ORDER — OMEPRAZOLE 40 MG/1
40 CAPSULE, DELAYED RELEASE ORAL DAILY
Qty: 60 CAP | Refills: 0 | Status: SHIPPED | OUTPATIENT
Start: 2018-11-07 | End: 2019-04-10

## 2018-11-07 RX ORDER — SERTRALINE HYDROCHLORIDE 25 MG/1
25 TABLET, FILM COATED ORAL DAILY
Qty: 90 TAB | Refills: 0 | Status: SHIPPED | OUTPATIENT
Start: 2018-11-07 | End: 2019-02-23 | Stop reason: SDUPTHER

## 2018-11-07 NOTE — ASSESSMENT & PLAN NOTE
Chronic in nature.  Patient states that symptoms are starting and she would like a prescription for Zoloft 25 mg states this works well without side effects.  States that she generally takes this through the winter and weaned off the medication in the spring.

## 2018-11-07 NOTE — ASSESSMENT & PLAN NOTE
Chronic in nature.  Dates that as it has been worse lately.  Patient does continue to take omeprazole intermittently but states that she has been taking it every day.  Patient is concerned regarding stomach ulcers denies past history of this issue.

## 2018-11-07 NOTE — ASSESSMENT & PLAN NOTE
Positive tonsillar lymphadenopathy patient states this is been going on over 4 years and is requesting ultrasound.  Discussed with patient that swelling with infections is within normal limits.  Patient states that she was like to have an ultrasound considering that this has been happening intermittently over multiple years.

## 2018-11-07 NOTE — ASSESSMENT & PLAN NOTE
Chronic in nature.  Patient is following with Dr. Salinas regarding this issue.  There is some concern that this is a hormone sensitive meningioma and that putting her on progesterone for adenomyosis would cause growth of the meningioma such patient is requesting follow-up with endocrinology.

## 2018-11-07 NOTE — ASSESSMENT & PLAN NOTE
Chronic in nature. Patient has been taking omeprazole as she has been taking advil. States omeprazole does improve heartburn, but states that she continues to have pain that radiates through to her back. States that the pain and swelling feeling does not improve. States that the pressure had improved, but is worse at the current point. States that no coffee does help. No tenderness between her ribs.

## 2018-11-07 NOTE — PROGRESS NOTES
"Chief Complaint   Patient presents with   • Medication Refill   • Referral Needed     Gastro; certain pills and food makes stomach hurt        HISTORY OF PRESENT ILLNESS: Patient is a 38 y.o. female established patient who presents today to discuss abdominal pain.    Left upper quadrant pain  Chronic in nature. Patient has been taking omeprazole as she has been taking advil. States omeprazole does improve heartburn, but states that she continues to have pain that radiates through to her back. States that the pain and swelling feeling does not improve. States that the pressure had improved, but is worse at the current point. States that no coffee does help. No tenderness between her ribs.     Gastroesophageal reflux disease  Chronic in nature.  Dates that as it has been worse lately.  Patient does continue to take omeprazole intermittently but states that she has been taking it every day.  Patient is concerned regarding stomach ulcers denies past history of this issue.    Psoriatic arthritis (CMS-HCC)  Chronic in nature.  Stable.  Patient continues follow-up with Dr. Martinez.    Lymphadenopathy of head and neck  Positive tonsillar lymphadenopathy patient states this is been going on over 4 years and is requesting ultrasound.  Discussed with patient that swelling with infections is within normal limits.  Patient states that she was like to have an ultrasound considering that this has been happening intermittently over multiple years.    Meningioma (HCC)  Chronic in nature.  Patient is following with Dr. Salians regarding this issue.  There is some concern that this is a hormone sensitive meningioma and that putting her on progesterone for adenomyosis would cause growth of the meningioma such patient is requesting follow-up with endocrinology.    Acquired hypothyroidism  Per patient she has had \"borderline\" thyroid tests.    Seasonal depression (CMS-HCC) (HCC)  Chronic in nature.  Patient states that symptoms are starting " and she would like a prescription for Zoloft 25 mg states this works well without side effects.  States that she generally takes this through the winter and weaned off the medication in the spring.      Patient Active Problem List    Diagnosis Date Noted   • Acquired hypothyroidism 2018   • Iron deficiency anemia due to chronic blood loss 2018   • Dysesthesia 2018   • Meningioma ([facility]) 2018   • Lymphadenopathy of head and neck 2018   • Abnormal brain MRI 2018   • Pineal gland cyst 2018   • Dizziness 2017   • Seasonal depression ([facility]) 2017   • Numbness and tingling of left side of face 2017   • Decreased sensation of lower extremity 2017   • Gastroesophageal reflux disease 2017   • Neck pain 2017   • Psoriatic arthritis ([facility]) 2017   • Left upper quadrant pain 2017       Allergies:Doxycycline    Current Outpatient Prescriptions   Medication Sig Dispense Refill   • sertraline (ZOLOFT) 25 MG tablet Take 1 Tab by mouth every day. 90 Tab 0   • omeprazole (PRILOSEC) 40 MG delayed-release capsule Take 1 Cap by mouth every day. 60 Cap 0   • Turmeric 450 MG Cap Take  by mouth.     • Omega-3 Fatty Acids (FISH OIL) 1000 MG Cap capsule Take 1,000 mg by mouth 3 times a day, with meals.     • zinc sulfate 1 MG/ML Solution by Intravenous route.     • cyanocobalamin (VITAMIN B-12) 500 MCG Tab Take 500 mcg by mouth every day.     • vitamin D (CHOLECALCIFEROL) 1000 UNIT Tab Take 6,000 Units by mouth every day.       No current facility-administered medications for this visit.        Social History   Substance Use Topics   • Smoking status: Never Smoker   • Smokeless tobacco: Never Used   • Alcohol use Yes      Comment: Socially       Family Status   Relation Status   • Mo Alive   • Fa    • MGMo (Not Specified)   • MGFa (Not Specified)   • Bro Alive   • Son Alive     Family History   Problem Relation Age of Onset   • Psychiatry Mother        "  depression   • Hypertension Mother    • Arthritis Father         psoriatic   • Hypertension Father    • Psychiatry Father         depression   • Cancer Maternal Grandmother    • Cancer Maternal Grandfather         stomach   • Other Son         speech apraxia       Review of Systems:   Constitutional:  Negative for fever, chills, weight loss and malaise/fatigue.   HEENT:  Negative for ear pain, nosebleeds, congestion, sore throat and neck pain.    Eyes:  Negative for blurred vision.   Respiratory:  Negative for cough, sputum production, shortness of breath and wheezing.    Cardiovascular:  Negative for chest pain, palpitations, orthopnea and leg swelling.   Gastrointestinal: Positive for heartburn, negative nausea, vomiting and positive abdominal pain.   All other systems reviewed and are negative except as in HPI.    Exam:  Blood pressure 118/84, pulse 81, temperature 36.5 °C (97.7 °F), temperature source Temporal, resp. rate 16, height 1.626 m (5' 4\"), weight 64.9 kg (143 lb), last menstrual period 10/25/2018, SpO2 98 %, not currently breastfeeding.  General:  Normal appearing. No distress.  Neck:  Supple without JVD or bruit. Thyroid is not enlarged.  Pulmonary:  Clear to ausculation.  Normal effort. No rales, ronchi, or wheezing.  Cardiovascular:  Regular rate and rhythm without murmur. Carotid and radial pulses are intact and equal bilaterally.  Abdomen:  Soft, nontender, nondistended. Normal bowel sounds. Liver and spleen are not palpable  Neurologic:  Grossly nonfocal  Lymph: Positive left cervical lymphadenopathy or lymph node, no other cervical, supraclavicular or axillary lymph nodes are palpable  Skin:  Warm and dry.  No obvious lesions.  Musculoskeletal:  Normal gait. No extremity cyanosis, clubbing, or edema.  Psych:  Normal mood and affect. Alert and oriented x3. Judgment and insight is normal.      PLAN:    1. Left upper quadrant pain  2. Gastroesophageal reflux disease, esophagitis presence not " specified  Intermittent recurrent pain and swelling concern for ulcer plan to do high-dose omeprazole over 8 weeks as patient has been negative for H. pylori and will refer to gastroenterology for further workup  - REFERRAL TO GASTROENTEROLOGY  - omeprazole (PRILOSEC) 40 MG delayed-release capsule; Take 1 Cap by mouth every day.  Dispense: 60 Cap; Refill: 0    3. Psoriatic arthritis (HCC)  To follow-up with rheumatology    4. Seasonal depression (HCC)  Continue Zoloft.  - sertraline (ZOLOFT) 25 MG tablet; Take 1 Tab by mouth every day.  Dispense: 90 Tab; Refill: 0    5. Uterus, adenomyosis  6. Acquired hypothyroidism  Patient was seen by gynecology and diagnosed with adenomyosis states that in the past she has had some issues with borderline thyroid dysfunction, states that she was told she could not take hormones for the adenomyosis related to her meningioma patient is concerned and would like a guidance on these conditions as such patient is requesting referral to endocrinology.  - REFERRAL TO ENDOCRINOLOGY    7. Meningioma (HCC)  - REFERRAL TO ENDOCRINOLOGY    8. Lymphadenopathy of left cervical region  Repetitive intermittent tonsillar lymphadenopathy patient is requesting imaging.  - US-SOFT TISSUES OF HEAD - NECK; Future    Follow-up in 3 months or sooner as needed based on results. Patient is encouraged to be seen in the emergency room for chest pain, palpitations, shortness of breath, dizziness, severe abdominal pain or other concerning symptoms.    Please note that this dictation was created using voice recognition software. I have made every reasonable attempt to correct obvious errors, but I expect that there are errors of grammar and possibly content that I did not discover before finalizing the note.    Assessment/Plan:  1. Left upper quadrant pain  REFERRAL TO GASTROENTEROLOGY   2. Gastroesophageal reflux disease, esophagitis presence not specified  REFERRAL TO GASTROENTEROLOGY    omeprazole (PRILOSEC)  40 MG delayed-release capsule   3. Psoriatic arthritis (HCC)     4. Seasonal depression (HCC)  sertraline (ZOLOFT) 25 MG tablet   5. Uterus, adenomyosis  REFERRAL TO ENDOCRINOLOGY   6. Acquired hypothyroidism  REFERRAL TO ENDOCRINOLOGY   7. Meningioma (HCC)  REFERRAL TO ENDOCRINOLOGY   8. Lymphadenopathy of left cervical region  US-SOFT TISSUES OF HEAD - NECK          I have placed the below orders and discussed them with an approved delegating provider. The MA is performing the below orders under the direction of Dr. Frias.

## 2018-11-13 ENCOUNTER — HOSPITAL ENCOUNTER (OUTPATIENT)
Dept: RADIOLOGY | Facility: MEDICAL CENTER | Age: 38
End: 2018-11-13
Attending: NURSE PRACTITIONER
Payer: COMMERCIAL

## 2018-11-13 DIAGNOSIS — R59.0 LYMPHADENOPATHY OF LEFT CERVICAL REGION: ICD-10-CM

## 2018-11-13 PROCEDURE — 76536 US EXAM OF HEAD AND NECK: CPT

## 2018-11-15 ENCOUNTER — PATIENT MESSAGE (OUTPATIENT)
Dept: MEDICAL GROUP | Facility: PHYSICIAN GROUP | Age: 38
End: 2018-11-15

## 2018-11-16 ENCOUNTER — APPOINTMENT (OUTPATIENT)
Dept: RADIOLOGY | Facility: IMAGING CENTER | Age: 38
End: 2018-11-16
Attending: PHYSICIAN ASSISTANT
Payer: COMMERCIAL

## 2018-11-16 ENCOUNTER — OFFICE VISIT (OUTPATIENT)
Dept: URGENT CARE | Facility: PHYSICIAN GROUP | Age: 38
End: 2018-11-16
Payer: COMMERCIAL

## 2018-11-16 VITALS
HEIGHT: 64 IN | DIASTOLIC BLOOD PRESSURE: 78 MMHG | BODY MASS INDEX: 24.41 KG/M2 | HEART RATE: 100 BPM | SYSTOLIC BLOOD PRESSURE: 120 MMHG | WEIGHT: 143 LBS | RESPIRATION RATE: 18 BRPM | OXYGEN SATURATION: 97 % | TEMPERATURE: 99.8 F

## 2018-11-16 DIAGNOSIS — R07.89 CHEST TIGHTNESS: ICD-10-CM

## 2018-11-16 DIAGNOSIS — K21.9 GASTROESOPHAGEAL REFLUX DISEASE, ESOPHAGITIS PRESENCE NOT SPECIFIED: ICD-10-CM

## 2018-11-16 DIAGNOSIS — J35.8 TONSIL STONE: ICD-10-CM

## 2018-11-16 LAB
INT CON NEG: NORMAL
INT CON POS: NORMAL
S PYO AG THROAT QL: NORMAL

## 2018-11-16 PROCEDURE — 99214 OFFICE O/P EST MOD 30 MIN: CPT | Performed by: PHYSICIAN ASSISTANT

## 2018-11-16 PROCEDURE — 87880 STREP A ASSAY W/OPTIC: CPT | Performed by: PHYSICIAN ASSISTANT

## 2018-11-16 PROCEDURE — 71046 X-RAY EXAM CHEST 2 VIEWS: CPT | Mod: 26 | Performed by: PHYSICIAN ASSISTANT

## 2018-11-16 ASSESSMENT — ENCOUNTER SYMPTOMS
MYALGIAS: 0
HEARTBURN: 1
PALPITATIONS: 0
SHORTNESS OF BREATH: 0
NAUSEA: 0
DIZZINESS: 0
SORE THROAT: 0
HEADACHES: 0
WHEEZING: 0
ABDOMINAL PAIN: 1
VOMITING: 0
NECK PAIN: 0
CONSTIPATION: 0
BLURRED VISION: 0
CHILLS: 0
FEVER: 0
COUGH: 0
EYE PAIN: 0
BLOOD IN STOOL: 0
DIARRHEA: 0

## 2018-11-16 NOTE — PROGRESS NOTES
Subjective:      Nubia Arce is a 38 y.o. female who presents with Heartburn (patient has chest tightness, and muscle pain around the chest area. Patient started taking omeprazole about 1 week ago and it made the heartburn worse. she stopped taking it this morning.  sore throat. )      HPI:  This is a new problem. Nubia Acre is a 38 y.o. female who presents today for evaluation of chest pain/tightness and possible strep throat.  Patient was seen 1-2 weeks ago by her PCP for similar complaints of chest pain/tightness and reflux.  She was prescribed omeprazole and a referral was placed to gastroenterology.  She said that the omeprazole seemed to actually make her symptoms a little bit worse, however.  She states that last night she called the pharmacist and was instructed to discontinue taking the omeprazole.  She says that she has some tenderness on her chest in the area of her heart and so is concerned that something might be going on.  She also states that just feels tight in the center of her chest going from her abdomen up towards her throat.  She states that she has a lot of medical problems at the current moment and not sure what is related and was not.  She denies fever/chills, cough, shortness of breath, lightheadedness/dizziness, or n/v/d/c.  She also states that on Tuesday she was at the dentist and they told her that her throat looked a little bit red in the week she had some tonsil stones.  She said that they instructed her how to try to remove the stones herself were that she might want to be evaluated for strep throat.  Her throat does not hurt and she has had no sick contacts.          Review of Systems   Constitutional: Positive for malaise/fatigue. Negative for chills and fever.   HENT: Negative for congestion, ear pain and sore throat.    Eyes: Negative for blurred vision and pain.   Respiratory: Negative for cough, shortness of breath and wheezing.    Cardiovascular: Positive  "for chest pain. Negative for palpitations and leg swelling.   Gastrointestinal: Positive for abdominal pain and heartburn. Negative for blood in stool, constipation, diarrhea, melena, nausea and vomiting.   Musculoskeletal: Negative for myalgias and neck pain.   Skin: Negative for itching and rash.   Neurological: Negative for dizziness and headaches.       PMH:  has a past medical history of Allergy; Depression; HLA B27 (HLA B27 positive); MTHFR gene mutation (HCC); and Psoriatic arthritis (HCC).  MEDS:   Current Outpatient Prescriptions:   •  sertraline (ZOLOFT) 25 MG tablet, Take 1 Tab by mouth every day. (Patient not taking: Reported on 11/16/2018), Disp: 90 Tab, Rfl: 0  •  omeprazole (PRILOSEC) 40 MG delayed-release capsule, Take 1 Cap by mouth every day. (Patient not taking: Reported on 11/16/2018), Disp: 60 Cap, Rfl: 0  •  Turmeric 450 MG Cap, Take  by mouth., Disp: , Rfl:   •  Omega-3 Fatty Acids (FISH OIL) 1000 MG Cap capsule, Take 1,000 mg by mouth 3 times a day, with meals., Disp: , Rfl:   •  zinc sulfate 1 MG/ML Solution, by Intravenous route., Disp: , Rfl:   •  cyanocobalamin (VITAMIN B-12) 500 MCG Tab, Take 500 mcg by mouth every day., Disp: , Rfl:   •  vitamin D (CHOLECALCIFEROL) 1000 UNIT Tab, Take 6,000 Units by mouth every day., Disp: , Rfl:   ALLERGIES:   Allergies   Allergen Reactions   • Doxycycline Vomiting     2016      SURGHX:   Past Surgical History:   Procedure Laterality Date   • PRIMARY C SECTION       SOCHX:  reports that she has never smoked. She has never used smokeless tobacco. She reports that she drinks alcohol. She reports that she does not use drugs.  FH: Family history was reviewed, no pertinent findings to report       Objective:     /78 (BP Location: Left arm, Patient Position: Sitting, BP Cuff Size: Adult)   Pulse 100   Temp 37.7 °C (99.8 °F) (Temporal)   Resp 18   Ht 1.626 m (5' 4\")   Wt 64.9 kg (143 lb)   LMP 10/25/2018   SpO2 97%   BMI 24.55 kg/m²  "       Physical Exam   Constitutional: She is oriented to person, place, and time. She appears well-developed and well-nourished.   HENT:   Head: Normocephalic and atraumatic.   Right Ear: Tympanic membrane, external ear and ear canal normal.   Left Ear: Tympanic membrane, external ear and ear canal normal.   Nose: Nose normal.   Mouth/Throat: Uvula is midline, oropharynx is clear and moist and mucous membranes are normal. Tonsils are 1+ on the right. Tonsils are 1+ on the left. No tonsillar exudate.   Eyes: Pupils are equal, round, and reactive to light. Conjunctivae and EOM are normal.   Neck: Normal range of motion.   Cardiovascular: Normal rate, regular rhythm, S1 normal, S2 normal, normal heart sounds and normal pulses.  PMI is not displaced.  Exam reveals no friction rub.    No murmur heard.  Pulmonary/Chest: Effort normal and breath sounds normal. She has no decreased breath sounds. She has no wheezes. She has no rhonchi. She has no rales. She exhibits tenderness. Left breast exhibits tenderness. Left breast exhibits no mass.   Patient has mild costochondritis, worse on the left side.  Left breast exhibits tenderness more medially.  No erythema, ecchymosis, mass, or obvious deformity identified.   Abdominal: Soft. Normal appearance and bowel sounds are normal. There is no hepatosplenomegaly. There is tenderness in the epigastric area. There is no rigidity, no rebound and no guarding.   Lymphadenopathy:     She has no cervical adenopathy.   Neurological: She is alert and oriented to person, place, and time.   Skin: Skin is warm and dry. Capillary refill takes less than 2 seconds.   Psychiatric: She has a normal mood and affect. Her behavior is normal. Judgment normal.   Vitals reviewed.      EKG Interpretation-HR is 89 normal EKG, normal sinus rhythm, there are no previous tracings available for comparison    DX-CHEST-2 VIEWS  Impression:   Negative two views of the chest.         POCT Rapid Strep A -  Negative      Assessment/Plan:     1. Gastroesophageal reflux disease, esophagitis presence not specified  -Patient has an appointment with the gastroenterology office on Monday    2. Chest tightness  - DX-CHEST-2 VIEWS  -Normal chest x-ray and EKG with unremarkable physical exam findings.  Advised that it is likely due to her GERD  - Strict ER precautions discussed    3. Tonsil stone  - POCT Rapid Strep A  -No tonsil stones visualized on exam and negative strep.  Advised patient to follow-up with her primary care.         Differential Diagnosis, natural history, and supportive care discussed. Return to the Urgent Care or follow up with your PCP if symptoms fail to resolve, or for any new or worsening symptoms. Emergency room precautions discussed. Patient and/or family appears understanding of information.

## 2019-01-17 ENCOUNTER — OFFICE VISIT (OUTPATIENT)
Dept: MEDICAL GROUP | Facility: PHYSICIAN GROUP | Age: 39
End: 2019-01-17
Payer: COMMERCIAL

## 2019-01-17 VITALS
SYSTOLIC BLOOD PRESSURE: 118 MMHG | OXYGEN SATURATION: 100 % | TEMPERATURE: 97.8 F | HEIGHT: 64 IN | DIASTOLIC BLOOD PRESSURE: 72 MMHG | RESPIRATION RATE: 16 BRPM | WEIGHT: 143 LBS | BODY MASS INDEX: 24.41 KG/M2 | HEART RATE: 101 BPM

## 2019-01-17 DIAGNOSIS — L40.50 PSORIATIC ARTHRITIS (HCC): ICD-10-CM

## 2019-01-17 DIAGNOSIS — M94.0 COSTOCHONDRITIS: ICD-10-CM

## 2019-01-17 DIAGNOSIS — N64.4 BREAST PAIN, LEFT: ICD-10-CM

## 2019-01-17 DIAGNOSIS — K21.9 GASTROESOPHAGEAL REFLUX DISEASE, ESOPHAGITIS PRESENCE NOT SPECIFIED: ICD-10-CM

## 2019-01-17 PROCEDURE — 99214 OFFICE O/P EST MOD 30 MIN: CPT | Performed by: NURSE PRACTITIONER

## 2019-01-17 RX ORDER — VALACYCLOVIR HYDROCHLORIDE 1 G/1
1000 TABLET, FILM COATED ORAL 3 TIMES DAILY
Refills: 0 | COMMUNITY
Start: 2019-01-10 | End: 2019-04-10

## 2019-01-17 RX ORDER — OMEPRAZOLE 20 MG/1
20 CAPSULE, DELAYED RELEASE ORAL DAILY
Refills: 5 | COMMUNITY
Start: 2019-01-09 | End: 2019-04-10

## 2019-01-17 ASSESSMENT — PATIENT HEALTH QUESTIONNAIRE - PHQ9: CLINICAL INTERPRETATION OF PHQ2 SCORE: 0

## 2019-01-17 NOTE — PROGRESS NOTES
Chief Complaint   Patient presents with   • Chest Pressure     tightness; tender with breast        HISTORY OF THE PRESENT ILLNESS: This is a 38 y.o. female established patient who presents today for follow up on chest pressure.    Patient has a history of anxiety which is chronic in nature. She states she is feeling more anxious than usual today. She was noted to have an elevated heart rate of 101 in clinic today. She mentions recently starting a new job.    She has a history of chronic chest tightness since before 2017. She underwent multiple follow ups and tests in the past which were generally unrevealing. She was experiencing this pain during this past [place] which felt like costochondritis symptoms. She has had this pain when taking a breath which in turn makes it difficult for her to take a deep breath. She also started experiencing radiation of pain to her back which she did not have before. She states these symptoms seemed to generally subside to some degree in the last 1-2 weeks. She denies any radiation of sharp pain. The pain seems to be exacerbated when she is anxious. She was evaluated at Urgent Care in November 2018 and had a normal EKG. She was advised to seek medical attention if the pain does not improve. Patient mentions she has followed with rheumatology in the past but not in the last couple of years.    She has a history of gastroesophageal reflux which is chronic in nature. She mentions recently having to stop her omeprazole due to worsened reflux symptoms. She has an upcoming endoscopy procedure. She mentions she was previously told by a doctor that she possibly has a hiatal hernia.     Patient reports having tenderness to palpation of her left breast area which has been going since November 2018. Her last breast exam was in August 2018 by her OBGYN. Her next appointment OBGYN appointment is at the end of the month.  Patient denies any significant alleviating factors to the pain. She  denies any nipple discharge or lumps. States that it is a discreet area of pain that has not increased or decreased in size.    She has a history of psoriatic arthritis. Patient states over the last week, she has had increased sensitivity to her left scalp area. She reports noticing new psoriasis-like bumps in this area over the past 1.5 weeks. Patient reports having tenderness to the bumps when brushing her hair.  She had a Teladoc session for this recently and was prescribed valacyclovir due to concern for shingles. She states this mediation has provided some improvement to the bumps. She is no longer taking this medication. Patient reports having swollen lymph glands in her occipital area. No complaints of recent fevers or chills.      Past Medical History:   Diagnosis Date   • Allergy    • Depression    • HLA B27 (HLA B27 positive)    • MTHFR gene mutation (HCC)    • Psoriatic arthritis (HCC)        Past Surgical History:   Procedure Laterality Date   • PRIMARY C SECTION         Family Status   Relation Status   • Mo Alive   • Fa    • MGMo (Not Specified)   • MGFa (Not Specified)   • Bro Alive   • Son Alive     Family History   Problem Relation Age of Onset   • Psychiatry Mother         depression   • Hypertension Mother    • Arthritis Father         psoriatic   • Hypertension Father    • Psychiatry Father         depression   • Cancer Maternal Grandmother    • Cancer Maternal Grandfather         stomach   • Other Son         speech apraxia       Social History   Substance Use Topics   • Smoking status: Never Smoker   • Smokeless tobacco: Never Used   • Alcohol use Yes      Comment: Socially       Allergies: Doxycycline    Current Outpatient Prescriptions Ordered in [facility]   Medication Sig Dispense Refill   • sertraline (ZOLOFT) 25 MG tablet Take 1 Tab by mouth every day. 90 Tab 0   • omeprazole (PRILOSEC) 40 MG delayed-release capsule Take 1 Cap by mouth every day. 60 Cap 0   • Turmeric 450 MG Cap Take   "by mouth.     • Omega-3 Fatty Acids (FISH OIL) 1000 MG Cap capsule Take 1,000 mg by mouth 3 times a day, with meals.     • zinc sulfate 1 MG/ML Solution by Intravenous route.     • cyanocobalamin (VITAMIN B-12) 500 MCG Tab Take 500 mcg by mouth every day.     • vitamin D (CHOLECALCIFEROL) 1000 UNIT Tab Take 6,000 Units by mouth every day.     • valacyclovir (VALTREX) 1 GM Tab Take 1,000 mg by mouth 3 times a day.  0   • omeprazole (PRILOSEC) 20 MG delayed-release capsule Take 20 mg by mouth every day.  5     No current [facility]-ordered facility-administered medications on file.        Review of Systems   Constitutional: Negative for fever, chills, weight loss and malaise/fatigue.   HENT: Psoriasis-like bumps on left scalp, swelling of left occipital lymph glands. Negative for nosebleeds, congestion, sore throat    Gastrointestinal: Chronic reflux. Negative for nausea, vomiting and abdominal pain.   Musculoskeletal: Chest pressure/tightness, left neck pain (pt states pain also goes to ear). Negative for back pain and joint pain.   Breast: Left breast tenderness. Negative for nipple discharge or lumps.  Skin: Psoriasis-like bumps on left scalp.  All other systems reviewed and are negative except as in HPI.    Exam: Blood pressure 118/72, pulse (!) 101, temperature 36.6 °C (97.8 °F), temperature source Temporal, resp. rate 16, height 1.626 m (5' 4\"), weight 64.9 kg (143 lb), SpO2 100 %, not currently breastfeeding.  General:  Normal appearing. No distress.  HEENT: Normocephalic. Eyes conjunctiva clear lids without ptosis, pupils equal and reactive to light accommodation, ears normal shape and contour, canals are clear bilaterally, tympanic membranes are benign, nasal mucosa benign, oropharynx is without erythema, edema or exudates.   Neck:  Supple without JVD. Thyroid is not enlarged.  Pulmonary:  Clear to ausculation.  Normal effort. No rales, ronchi, or wheezing.  Cardiovascular:  Regular rate and rhythm without murmur. " Carotid and radial pulses are intact and equal bilaterally.  Neurologic:  Grossly nonfocal  Lymph: Mild swelling on left occipital lymph node. Otherwise no cervical, supraclavicular or axillary lymph nodes are palpable  Skin:  Warm and dry.  Small plaque of scaling, erythematous skin superior to left ear.  Musculoskeletal:  Normal gait. No extremity cyanosis, clubbing, or edema. Intercostal tenderness bilateral chest.   Breasts: Breasts examined seated and supine. No skin changes, peau d'orange or nipple retraction. No discharge. Breast move freely and equally without restriction. No axillary or supraclavicular adenopathy. No masses or nodularity palpable. Positive tenderness at 8 o'clock on left breast, 1 cm [place], no redness or skin changes over all areas of tenderness  Psych:  Normal mood and affect. Alert and oriented x3. Judgment and insight is normal.      PLAN:    1. Psoriatic arthritis (HCC)  - Patient states she has not been evaluated by rheumatology in a couple of years. Referring her to rheumatology.  - REFERRAL TO RHEUMATOLOGY    2. Breast pain, left  - Ordering ultrasound evaluation.  - US-BREAST COMPLETE-LEFT; Future     Follow-up as needed based on results. Patient is encouraged to be seen in the emergency room for chest pain, palpitations, shortness of breath, dizziness, severe abdominal pain or other concerning symptoms.     Please note that this dictation was created using voice recognition software. I have made every reasonable attempt to correct obvious errors, but I expect that there are errors of grammar and possibly content that I did not discover before finalizing the note.      Assessment/Plan  1. Psoriatic arthritis (HCC)  REFERRAL TO RHEUMATOLOGY   2. Breast pain, left  US-BREAST COMPLETE-LEFT         I have placed the below orders and discussed them with an approved delegating provider. The MA is performing the below orders under the direction of Dr. Frias.       Jos CORTEZ  (Scribe), am scribing for, and in the presence of, CLARISSA Buckley    Electronically signed by: Jos Villar (Scribe), 1/17/2019    I, CLARISSA Buckley personally performed the services described in this documentation, as scribed by Jos Villar in my presence, and it is both accurate and complete.

## 2019-01-30 ENCOUNTER — TELEPHONE (OUTPATIENT)
Dept: MEDICAL GROUP | Facility: PHYSICIAN GROUP | Age: 39
End: 2019-01-30

## 2019-01-30 NOTE — TELEPHONE ENCOUNTER
1. Caller Name: Nubia Arce    Call Back Number: [phone] (home)     Called and spoke to patient. Gave patient the number to call to make an apt [phone]. LM

## 2019-01-30 NOTE — TELEPHONE ENCOUNTER
----- Message from Nubia Arce sent at 1/30/2019  9:40 AM PST -----  Regarding: RE: Procedure Question  Contact: [phone]  Yes, my understanding is that where [facility] the order to. Maybe it just takes them longer to contact people than the main [facility].  ----- Message -----  From: Cecilia Sullivan, Med Ass't  Sent: 1/30/2019  7:02 AM PST  To: Nubia Arce  Subject: RE: Procedure Question  Kamar Snider did place the US order on 01/17/19. You were going to the breast center correct?     ----- Message -----     From: Nubia Arce     Sent: 1/29/2019  6:25 PM PST       To: Kamar Berkowitz, A.P.R.N.  Subject: Procedure Question    Devon Chen,    Just wanted to check in regarding the referral for the ultrasound of my left breast. I have not received a call about it yet.      Thank you

## 2019-02-23 DIAGNOSIS — F33.8 SEASONAL DEPRESSION (HCC): ICD-10-CM

## 2019-02-25 RX ORDER — SERTRALINE HYDROCHLORIDE 25 MG/1
TABLET, FILM COATED ORAL
Qty: 90 TAB | Refills: 0 | Status: SHIPPED | OUTPATIENT
Start: 2019-02-25 | End: 2019-05-30 | Stop reason: SDUPTHER

## 2019-03-13 ENCOUNTER — HOSPITAL ENCOUNTER (OUTPATIENT)
Dept: RADIOLOGY | Facility: MEDICAL CENTER | Age: 39
End: 2019-03-13
Attending: PHYSICIAN ASSISTANT
Payer: COMMERCIAL

## 2019-03-13 DIAGNOSIS — R10.9 STOMACH ACHE: ICD-10-CM

## 2019-03-13 PROCEDURE — 76700 US EXAM ABDOM COMPLETE: CPT

## 2019-03-15 ENCOUNTER — PATIENT MESSAGE (OUTPATIENT)
Dept: MEDICAL GROUP | Facility: PHYSICIAN GROUP | Age: 39
End: 2019-03-15

## 2019-03-18 ENCOUNTER — PATIENT MESSAGE (OUTPATIENT)
Dept: MEDICAL GROUP | Facility: PHYSICIAN GROUP | Age: 39
End: 2019-03-18

## 2019-03-18 NOTE — TELEPHONE ENCOUNTER
From: Nubia Arce  To: ETTA Barbosa  Sent: 3/18/2019 9:38 AM PDT  Subject: Non-Urgent Medical Question    Good morning!  That’s ok, I figured you were out. No, I have not heard from the GI doctor yet. I’m really suspecting that this inflammation could be from Costochondritis with my arthritis but how to I confirm this. What kind of doctor can diagnose and determine this? I just have an overall feeling of malaise. I did see the Rhuematologist that you referred me to. That’s a whole other topic. He was the most lazy, uninvolved doctor who could care less about my time there as a patient or what my concerns were. He answered zero questions. All he wanted to do was write a prescribed for Humira and send me on my way. His office was dirty and unprofessional. (I would highly recommend not referring patients to [facility]).   Sorry! Thank you Gust[facility]!  ----- Message -----  From: ETTA Barbosa  Sent: 3/18/2019 8:56 AM PDT  To: Nubia Arce  Subject: RE: Non-Urgent Medical Question  Devon Delacruz,    I apologize for the delay, I am out of the office on Fridays. Have you been able to discuss the ultrasound result with GI? They would be the best provider to discuss this with. If not let me know.    Thank you,    CLARISSA Buckley          ----- Message -----   From: Nubia Arce   Sent: 3/15/2019 1:27 PM PDT   To: ETTA Barbosa  Subject: Non-Urgent Medical Question    Devon Chen,  Hope you are well. I continue to have ongoing left side chest and abdominal pain. Breast ultrasound and mammogram revealed some cysts but otherwise good. But she has referred me to a general surgeon who I won’t see until April 8th. I just did a complete abdominal ultrasound and all organs look good but there appears to be “complex fluid” around my spleen, which is concerning. Infection?! My pain and discomfort is now constant and seems to be worsening. The GI  doctor is out until Monday to explain the ultrasound report to me. Would you be able to? Also, how can we check this further? I’m getting really concerned especially cause it involves my heart area too. Is there another doctor I should be seeing? Getting desperate. Thank you

## 2019-03-19 NOTE — TELEPHONE ENCOUNTER
From: Nubia Arce  To: ETTA Barbosa  Sent: 3/18/2019 4:48 PM PDT  Subject: Non-Urgent Medical Question    Thank you so much for contacting them for me! Still haven’t heard from them but I’ll let you know when I do.   ----- Message -----  From: ETTA Barbosa  Sent: 3/18/2019 2:12 PM PDT  To: Nubia Arce  Subject: RE: Non-Urgent Medical Question  I spoke with gastroenterology office, and asked specifically for them to get in touch with you regarding the result I also left my contact information so that they could call me if I need to assist with the follow-up. I can call again a request to speak with [facility] if they do not get in touch with you. Please let me know if they get in touch with you regarding this result, I agree that we need to discuss the next step with them as soon as we can.    Thank you,    CLARISSA Buckley      ----- Message -----   From: Nubia Arce   Sent: 3/18/2019 9:38 AM PDT   To: ETTA Barbosa  Subject: Non-Urgent Medical Question    Good morning!  That’s ok, I figured you were out. No, I have not heard from the GI doctor yet. I’m really suspecting that this inflammation could be from Costochondritis with my arthritis but how to I confirm this. What kind of doctor can diagnose and determine this? I just have an overall feeling of malaise. I did see the Rhuematologist that you referred me to. That’s a whole other topic. He was the most lazy, uninvolved doctor who could care less about my time there as a patient or what my concerns were. He answered zero questions. All he wanted to do was write a prescribed for Humira and send me on my way. His office was dirty and unprofessional. (I would highly recommend not referring patients to [facility]).   Sorry! Thank you [facility]!  ----- Message -----  From: ETTA Barbosa  Sent: 3/18/2019 8:56 AM PDT  To: Nubia Arce  Subject:  RE: Non-Urgent Medical Question  Devon Delacruz,    I apologize for the delay, I am out of the office on Fridays. Have you been able to discuss the ultrasound result with GI? They would be the best provider to discuss this with. If not let me know.    Thank you,    Kamar Berkowitz, CLARISSA          ----- Message -----   From: Nubia Arce   Sent: 3/15/2019 1:27 PM PDT   To: Kamar Berkowitz, A.P.R.NRachele  Subject: Non-Urgent Medical Question    Devon Chen,  Hope you are well. I continue to have ongoing left side chest and abdominal pain. Breast ultrasound and mammogram revealed some cysts but otherwise good. But she has referred me to a general surgeon who I won’t see until April 8th. I just did a complete abdominal ultrasound and all organs look good but there appears to be “complex fluid” around my spleen, which is concerning. Infection?! My pain and discomfort is now constant and seems to be worsening. The GI doctor is out until Monday to explain the ultrasound report to me. Would you be able to? Also, how can we check this further? I’m getting really concerned especially cause it involves my heart area too. Is there another doctor I should be seeing? Getting desperate. Thank you

## 2019-03-20 ENCOUNTER — PATIENT MESSAGE (OUTPATIENT)
Dept: MEDICAL GROUP | Facility: PHYSICIAN GROUP | Age: 39
End: 2019-03-20

## 2019-03-22 ENCOUNTER — HOSPITAL ENCOUNTER (EMERGENCY)
Dept: HOSPITAL 8 - ED | Age: 39
Discharge: HOME | End: 2019-03-22
Payer: COMMERCIAL

## 2019-03-22 VITALS — SYSTOLIC BLOOD PRESSURE: 113 MMHG | DIASTOLIC BLOOD PRESSURE: 77 MMHG

## 2019-03-22 VITALS — BODY MASS INDEX: 25.97 KG/M2 | HEIGHT: 64 IN | WEIGHT: 152.12 LBS

## 2019-03-22 DIAGNOSIS — R10.12: Primary | ICD-10-CM

## 2019-03-22 LAB
ALBUMIN SERPL-MCNC: 3.6 G/DL (ref 3.4–5)
ALP SERPL-CCNC: 31 U/L (ref 45–117)
ALT SERPL-CCNC: 19 U/L (ref 12–78)
ANION GAP SERPL CALC-SCNC: 4 MMOL/L (ref 5–15)
BASOPHILS # BLD AUTO: 0.01 X10^3/UL (ref 0–0.1)
BASOPHILS NFR BLD AUTO: 0 % (ref 0–1)
BILIRUB SERPL-MCNC: 0.3 MG/DL (ref 0.2–1)
CALCIUM SERPL-MCNC: 8.9 MG/DL (ref 8.5–10.1)
CHLORIDE SERPL-SCNC: 112 MMOL/L (ref 98–107)
CREAT SERPL-MCNC: 0.73 MG/DL (ref 0.55–1.02)
EOSINOPHIL # BLD AUTO: 0.04 X10^3/UL (ref 0–0.4)
EOSINOPHIL NFR BLD AUTO: 1 % (ref 1–7)
ERYTHROCYTE [DISTWIDTH] IN BLOOD BY AUTOMATED COUNT: 14.8 % (ref 9.6–15.2)
LYMPHOCYTES # BLD AUTO: 1.13 X10^3/UL (ref 1–3.4)
LYMPHOCYTES NFR BLD AUTO: 26 % (ref 22–44)
MCH RBC QN AUTO: 27.3 PG (ref 27–34.8)
MCHC RBC AUTO-ENTMCNC: 33.8 G/DL (ref 32.4–35.8)
MCV RBC AUTO: 80.8 FL (ref 80–100)
MD: NO
MONOCYTES # BLD AUTO: 0.19 X10^3/UL (ref 0.2–0.8)
MONOCYTES NFR BLD AUTO: 4 % (ref 2–9)
NEUTROPHILS # BLD AUTO: 3.03 X10^3/UL (ref 1.8–6.8)
NEUTROPHILS NFR BLD AUTO: 69 % (ref 42–75)
PLATELET # BLD AUTO: 269 X10^3/UL (ref 130–400)
PMV BLD AUTO: 7.9 FL (ref 7.4–10.4)
PROT SERPL-MCNC: 6.6 G/DL (ref 6.4–8.2)
RBC # BLD AUTO: 4.87 X10^6/UL (ref 3.82–5.3)
TROPONIN I SERPL-MCNC: < 0.015 NG/ML (ref 0–0.04)

## 2019-03-22 PROCEDURE — 80053 COMPREHEN METABOLIC PANEL: CPT

## 2019-03-22 PROCEDURE — 99284 EMERGENCY DEPT VISIT MOD MDM: CPT

## 2019-03-22 PROCEDURE — 85025 COMPLETE CBC W/AUTO DIFF WBC: CPT

## 2019-03-22 PROCEDURE — 93005 ELECTROCARDIOGRAM TRACING: CPT

## 2019-03-22 PROCEDURE — 36415 COLL VENOUS BLD VENIPUNCTURE: CPT

## 2019-03-22 PROCEDURE — 85379 FIBRIN DEGRADATION QUANT: CPT

## 2019-03-22 PROCEDURE — 83690 ASSAY OF LIPASE: CPT

## 2019-03-22 PROCEDURE — 84484 ASSAY OF TROPONIN QUANT: CPT

## 2019-04-10 ENCOUNTER — OFFICE VISIT (OUTPATIENT)
Dept: OTHER | Facility: IMAGING CENTER | Age: 39
End: 2019-04-10
Payer: COMMERCIAL

## 2019-04-10 VITALS
DIASTOLIC BLOOD PRESSURE: 70 MMHG | OXYGEN SATURATION: 100 % | HEIGHT: 64 IN | HEART RATE: 76 BPM | BODY MASS INDEX: 24.43 KG/M2 | WEIGHT: 143.08 LBS | TEMPERATURE: 99 F | RESPIRATION RATE: 12 BRPM | SYSTOLIC BLOOD PRESSURE: 112 MMHG

## 2019-04-10 DIAGNOSIS — Z13.220 SCREENING, LIPID: ICD-10-CM

## 2019-04-10 DIAGNOSIS — F43.29 STRESS AND ADJUSTMENT REACTION: ICD-10-CM

## 2019-04-10 DIAGNOSIS — D50.0 IRON DEFICIENCY ANEMIA DUE TO CHRONIC BLOOD LOSS: ICD-10-CM

## 2019-04-10 DIAGNOSIS — F33.8 SEASONAL DEPRESSION (HCC): ICD-10-CM

## 2019-04-10 DIAGNOSIS — L40.50 PSORIATIC ARTHRITIS (HCC): ICD-10-CM

## 2019-04-10 DIAGNOSIS — E34.8 PINEAL GLAND CYST: ICD-10-CM

## 2019-04-10 DIAGNOSIS — D32.9 MENINGIOMA (HCC): ICD-10-CM

## 2019-04-10 PROCEDURE — 99205 OFFICE O/P NEW HI 60 MIN: CPT | Performed by: FAMILY MEDICINE

## 2019-04-10 RX ORDER — NAPROXEN 500 MG/1
500 TABLET ORAL 2 TIMES DAILY WITH MEALS
COMMUNITY
End: 2019-04-10 | Stop reason: SDUPTHER

## 2019-04-10 RX ORDER — NAPROXEN 500 MG/1
500 TABLET ORAL
Qty: 30 TAB | Refills: 0 | Status: SHIPPED | OUTPATIENT
Start: 2019-04-10 | End: 2019-05-21 | Stop reason: SDUPTHER
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
## 2019-04-10 NOTE — LETTER
[employer] [facility]  Yesisca Duke M.D.  [address] [place]  [place] [address]  Fax: [phone]   Authorization for Release/Disclosure of   Protected Health Information   Name: NUBIA ARCE : 1980 SSN: [id number]   Address: [address] [place] [address]  [place] [address] Phone:    [phone] (home)    I authorize the entity listed below to release/disclose the PHI below to:   [facility]/Yessica Duke M.D. and Yessica Duke M.D.   Provider or Entity Name:     Address   City, State, Zip   Phone:      Fax:     Reason for request: continuity of care   Information to be released:    [  ] LAST COLONOSCOPY,  including any PATH REPORT and follow-up  [  ] LAST FIT/COLOGUARD RESULT [  ] LAST DEXA  [  ] LAST MAMMOGRAM  [  ] LAST PAP  [  ] LAST LABS [  ] RETINA EXAM REPORT  [  ] IMMUNIZATION RECORDS  [  ] Release all info      [  ] Check here and initial the line next to each item to release ALL health information INCLUDING  _____ Care and treatment for drug and / or alcohol abuse  _____ HIV testing, infection status, or AIDS  _____ Genetic Testing    DATES OF SERVICE OR TIME PERIOD TO BE DISCLOSED: _____________  I understand and acknowledge that:  * This Authorization may be revoked at any time by you in writing, except if your health information has already been used or disclosed.  * Your health information that will be used or disclosed as a result of you signing this authorization could be re-disclosed by the recipient. If this occurs, your re-disclosed health information may no longer be protected by State or Federal laws.  * You may refuse to sign this Authorization. Your refusal will not affect your ability to obtain treatment.  * This Authorization becomes effective upon signing and will  on (date) __________.      If no date is indicated, this Authorization will  one (1) year from the signature date.    Name: Nubia Arce    Signature:   Date:     4/10/2019       PLEASE FAX  REQUESTED RECORDS BACK TO: [phone]

## 2019-04-16 ENCOUNTER — APPOINTMENT (OUTPATIENT)
Dept: INTERNAL MEDICINE | Facility: MEDICAL CENTER | Age: 39
End: 2019-04-16
Payer: COMMERCIAL

## 2019-04-22 ENCOUNTER — APPOINTMENT (OUTPATIENT)
Dept: MEDICAL GROUP | Facility: IMAGING CENTER | Age: 39
End: 2019-04-22

## 2019-04-22 ENCOUNTER — HOSPITAL ENCOUNTER (OUTPATIENT)
Dept: LAB | Facility: MEDICAL CENTER | Age: 39
End: 2019-04-22
Attending: FAMILY MEDICINE
Payer: COMMERCIAL

## 2019-04-22 DIAGNOSIS — F33.8 SEASONAL DEPRESSION (HCC): ICD-10-CM

## 2019-04-22 DIAGNOSIS — D50.0 IRON DEFICIENCY ANEMIA DUE TO CHRONIC BLOOD LOSS: ICD-10-CM

## 2019-04-22 DIAGNOSIS — Z13.220 SCREENING, LIPID: ICD-10-CM

## 2019-04-22 DIAGNOSIS — L40.50 PSORIATIC ARTHRITIS (HCC): ICD-10-CM

## 2019-04-22 LAB
25(OH)D3 SERPL-MCNC: 45 NG/ML (ref 30–100)
BASOPHILS # BLD AUTO: 0.8 % (ref 0–1.8)
BASOPHILS # BLD: 0.03 K/UL (ref 0–0.12)
EOSINOPHIL # BLD AUTO: 0.09 K/UL (ref 0–0.51)
EOSINOPHIL NFR BLD: 2.5 % (ref 0–6.9)
ERYTHROCYTE [DISTWIDTH] IN BLOOD BY AUTOMATED COUNT: 43.2 FL (ref 35.9–50)
HCT VFR BLD AUTO: 41.3 % (ref 37–47)
HGB BLD-MCNC: 12.9 G/DL (ref 12–16)
IMM GRANULOCYTES # BLD AUTO: 0 K/UL (ref 0–0.11)
IMM GRANULOCYTES NFR BLD AUTO: 0 % (ref 0–0.9)
LYMPHOCYTES # BLD AUTO: 1.43 K/UL (ref 1–4.8)
LYMPHOCYTES NFR BLD: 39.8 % (ref 22–41)
MCH RBC QN AUTO: 26.5 PG (ref 27–33)
MCHC RBC AUTO-ENTMCNC: 31.2 G/DL (ref 33.6–35)
MCV RBC AUTO: 85 FL (ref 81.4–97.8)
MONOCYTES # BLD AUTO: 0.27 K/UL (ref 0–0.85)
MONOCYTES NFR BLD AUTO: 7.5 % (ref 0–13.4)
NEUTROPHILS # BLD AUTO: 1.77 K/UL (ref 2–7.15)
NEUTROPHILS NFR BLD: 49.4 % (ref 44–72)
NRBC # BLD AUTO: 0 K/UL
NRBC BLD-RTO: 0 /100 WBC
PLATELET # BLD AUTO: 252 K/UL (ref 164–446)
PMV BLD AUTO: 10.1 FL (ref 9–12.9)
RBC # BLD AUTO: 4.86 M/UL (ref 4.2–5.4)
TSH SERPL DL<=0.005 MIU/L-ACNC: 2.19 UIU/ML (ref 0.38–5.33)
WBC # BLD AUTO: 3.6 K/UL (ref 4.8–10.8)

## 2019-04-22 PROCEDURE — 36415 COLL VENOUS BLD VENIPUNCTURE: CPT

## 2019-04-22 PROCEDURE — 80061 LIPID PANEL: CPT

## 2019-04-22 PROCEDURE — 84443 ASSAY THYROID STIM HORMONE: CPT

## 2019-04-22 PROCEDURE — 82306 VITAMIN D 25 HYDROXY: CPT

## 2019-04-22 PROCEDURE — 80053 COMPREHEN METABOLIC PANEL: CPT

## 2019-04-22 PROCEDURE — 85025 COMPLETE CBC W/AUTO DIFF WBC: CPT

## 2019-04-23 LAB
ALBUMIN SERPL BCP-MCNC: 4.1 G/DL (ref 3.2–4.9)
ALBUMIN/GLOB SERPL: 1.6 G/DL
ALP SERPL-CCNC: 32 U/L (ref 30–99)
ALT SERPL-CCNC: 12 U/L (ref 2–50)
ANION GAP SERPL CALC-SCNC: 6 MMOL/L (ref 0–11.9)
AST SERPL-CCNC: 13 U/L (ref 12–45)
BILIRUB SERPL-MCNC: 0.5 MG/DL (ref 0.1–1.5)
BUN SERPL-MCNC: 16 MG/DL (ref 8–22)
CALCIUM SERPL-MCNC: 9.1 MG/DL (ref 8.5–10.5)
CHLORIDE SERPL-SCNC: 106 MMOL/L (ref 96–112)
CHOLEST SERPL-MCNC: 170 MG/DL (ref 100–199)
CO2 SERPL-SCNC: 25 MMOL/L (ref 20–33)
CREAT SERPL-MCNC: 0.73 MG/DL (ref 0.5–1.4)
FASTING STATUS PATIENT QL REPORTED: NORMAL
GLOBULIN SER CALC-MCNC: 2.5 G/DL (ref 1.9–3.5)
GLUCOSE SERPL-MCNC: 80 MG/DL (ref 65–99)
HDLC SERPL-MCNC: 62 MG/DL
LDLC SERPL CALC-MCNC: 89 MG/DL
POTASSIUM SERPL-SCNC: 4 MMOL/L (ref 3.6–5.5)
PROT SERPL-MCNC: 6.6 G/DL (ref 6–8.2)
SODIUM SERPL-SCNC: 137 MMOL/L (ref 135–145)
TRIGL SERPL-MCNC: 94 MG/DL (ref 0–149)

## 2019-04-25 ENCOUNTER — APPOINTMENT (OUTPATIENT)
Dept: MEDICAL GROUP | Facility: IMAGING CENTER | Age: 39
End: 2019-04-25

## 2019-05-09 ENCOUNTER — OFFICE VISIT (OUTPATIENT)
Dept: MEDICAL GROUP | Facility: IMAGING CENTER | Age: 39
End: 2019-05-09
Payer: COMMERCIAL

## 2019-05-09 VITALS
OXYGEN SATURATION: 100 % | HEART RATE: 84 BPM | WEIGHT: 144 LBS | SYSTOLIC BLOOD PRESSURE: 116 MMHG | BODY MASS INDEX: 24.59 KG/M2 | RESPIRATION RATE: 12 BRPM | HEIGHT: 64 IN | TEMPERATURE: 98 F | DIASTOLIC BLOOD PRESSURE: 70 MMHG

## 2019-05-09 DIAGNOSIS — L40.50 PSORIATIC ARTHRITIS (HCC): ICD-10-CM

## 2019-05-09 DIAGNOSIS — D50.0 IRON DEFICIENCY ANEMIA DUE TO CHRONIC BLOOD LOSS: ICD-10-CM

## 2019-05-09 DIAGNOSIS — R53.83 FATIGUE, UNSPECIFIED TYPE: ICD-10-CM

## 2019-05-09 DIAGNOSIS — D72.819 LEUKOPENIA, UNSPECIFIED TYPE: ICD-10-CM

## 2019-05-09 DIAGNOSIS — R07.89 CHEST WALL PAIN: ICD-10-CM

## 2019-05-09 DIAGNOSIS — F43.0 STRESS REACTION: ICD-10-CM

## 2019-05-09 DIAGNOSIS — Z15.89 MTHFR GENE MUTATION: ICD-10-CM

## 2019-05-09 PROCEDURE — 99214 OFFICE O/P EST MOD 30 MIN: CPT | Performed by: FAMILY MEDICINE

## 2019-05-09 RX ORDER — MULTIVIT WITH MINERALS/LUTEIN
TABLET ORAL
COMMUNITY

## 2019-05-09 ASSESSMENT — PAIN SCALES - GENERAL: PAINLEVEL: 4=SLIGHT-MODERATE PAIN

## 2019-05-09 NOTE — PROGRESS NOTES
SUBJECTIVE:      Chief Complaint   Patient presents with   • Results   • Other     inflammation in rib area       HPI:     Nubia Arce is a 38 y.o. female with psoriatic arthritis here for follow up of lab results.     Psoriatric arthritis- has had some chest wall issues which have been evaluated. Has had some discomfort of left side of sternum/breast area. Feels has some inflammation of her left chest/rib area. Started back in November 2018. Had CXR and mammogram completed. Unremarkable findings for her symptoms. Now feels having some symptoms on her right side.   She is trying to get in to see rheumatology.   She has not used naproxen regularly.   Has seen Sue Abdi for acupuncture therapy and finds it helps her to feel more relaxed. Chiropractic therapy discussed.   Noted to have T7-8 disc protrusion and some sacral issues. No current back pain otherwise.     Stress reaction- she has been doing deep breaths, yoga and managing sleep.   She was recommended to increase her methyl folate to 15 mg daily per psychiatry. However she increased to 7.5 mg now and wondering if this could be making some of her symptoms worse.   Tends to have seasonal depression.     Decreased WBC onrecent labs. No recent or recurrent illness, but has had some fatigue. Patient concerned about levels as they appear to be further decreased compared to prior.   She is interested in checking for EBV.   Had some past lab in the ER.     Anemia- stable on labs. Last saw hematology June/July. Issues with ferritin being low.   Restarted iron supplement with vitamin C-compounding pharmacy.   Notes she has a family history of thalassemia.   She has been better with her diet this week.   Gluten free for while previously, but then crept back to more regular diet.     ROS:  Denies any recent fevers or chills. No nausea or vomiting. No diarrhea. No chest pains or shortness of breath. No lower extremity edema.    Current Outpatient Prescriptions  "on File Prior to Visit   Medication Sig Dispense Refill   • sertraline (ZOLOFT) 25 MG tablet TAKE 1 TABLET BY MOUTH EVERY DAY 90 Tab 0   • Turmeric 450 MG Cap Take  by mouth.     • Omega-3 Fatty Acids (FISH OIL) 1000 MG Cap capsule Take 1,000 mg by mouth 3 times a day, with meals.     • zinc sulfate 1 MG/ML Solution by Intravenous route.     • cyanocobalamin (VITAMIN B-12) 500 MCG Tab Take 500 mcg by mouth every day.     • vitamin D (CHOLECALCIFEROL) 1000 UNIT Tab Take 6,000 Units by mouth every day.     • naproxen (NAPROSYN) 500 MG Tab Take 1 Tab by mouth 2 times daily with meals as needed. 30 Tab 0     No current facility-administered medications on file prior to visit.        Allergies   Allergen Reactions   • Doxycycline Vomiting     2016        Patient Active Problem List    Diagnosis Date Noted   • MTHFR gene mutation ([facility])    • Breast tenderness in female 01/17/2019   • Acquired hypothyroidism 11/07/2018   • Iron deficiency anemia due to chronic blood loss 07/27/2018   • Dysesthesia 05/25/2018   • Meningioma ([facility]) 05/25/2018   • Lymphadenopathy of head and neck 02/01/2018   • Abnormal brain MRI 01/23/2018   • Pineal gland cyst 01/23/2018   • Dizziness 12/29/2017   • Seasonal depression ([facility]) 12/29/2017   • Numbness and tingling of left side of face 09/05/2017   • Decreased sensation of lower extremity 06/30/2017   • Gastroesophageal reflux disease 06/30/2017   • Neck pain 03/13/2017   • Psoriatic arthritis ([facility]) 02/09/2017   • Left upper quadrant pain 02/09/2017       Past Medical History:   Diagnosis Date   • Adenomyosis     aug 2018   • Allergy    • Anxiety    • Depression    • Generalized hypermobility of joints    • HLA B27 (HLA B27 positive)    • Meningioma ([facility])     Jan 2017   • MTHFR gene mutation ([facility])    • 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   • Psoriatic arthritis ([facility])          OBJECTIVE:   /70   Pulse 84   Temp 36.7 °C (98 °F)   Resp 12   Ht 1.626 m (5' 4\")   Wt 65.3 kg (144 lb)   SpO2 100%   " BMI 24.72 kg/m²   General: Well-developed well-nourished female, no acute distress  Neck: supple, no lymphadenopathy- cervical or supraclavicular, no thyromegaly  Cardiovascular: regular rate and rhythm, no murmurs, gallops, rubs  Lungs: clear to auscultation bilaterally, no wheezes, crackles, or rhonchi  Chest wall: left side upper anterior chest wall costochondral region with mild TTP.   Abdomen: +bowel sounds, soft, nontender, nondistended, no rebound, no guarding, no hepatosplenomegaly  Extremities: no cyanosis, clubbing, edema  Skin: Warm and dry  Psych: appropriate mood and affect       Ref. Range 4/22/2019 07:16   WBC Latest Ref Range: 4.8 - 10.8 K/uL 3.6 (L)   RBC Latest Ref Range: 4.20 - 5.40 M/uL 4.86   Hemoglobin Latest Ref Range: 12.0 - 16.0 g/dL 12.9   Hematocrit Latest Ref Range: 37.0 - 47.0 % 41.3   MCV Latest Ref Range: 81.4 - 97.8 fL 85.0   MCH Latest Ref Range: 27.0 - 33.0 pg 26.5 (L)   MCHC Latest Ref Range: 33.6 - 35.0 g/dL 31.2 (L)   RDW Latest Ref Range: 35.9 - 50.0 fL 43.2   Platelet Count Latest Ref Range: 164 - 446 K/uL 252   MPV Latest Ref Range: 9.0 - 12.9 fL 10.1   Neutrophils-Polys Latest Ref Range: 44.00 - 72.00 % 49.40   Neutrophils (Absolute) Latest Ref Range: 2.00 - 7.15 K/uL 1.77 (L)   Lymphocytes Latest Ref Range: 22.00 - 41.00 % 39.80   Lymphs (Absolute) Latest Ref Range: 1.00 - 4.80 K/uL 1.43   Monocytes Latest Ref Range: 0.00 - 13.40 % 7.50   Monos (Absolute) Latest Ref Range: 0.00 - 0.85 K/uL 0.27   Eosinophils Latest Ref Range: 0.00 - 6.90 % 2.50   Eos (Absolute) Latest Ref Range: 0.00 - 0.51 K/uL 0.09   Basophils Latest Ref Range: 0.00 - 1.80 % 0.80   Baso (Absolute) Latest Ref Range: 0.00 - 0.12 K/uL 0.03   Immature Granulocytes Latest Ref Range: 0.00 - 0.90 % 0.00   Immature Granulocytes (abs) Latest Ref Range: 0.00 - 0.11 K/uL 0.00   Nucleated RBC Latest Units: /100 WBC 0.00   NRBC (Absolute) Latest Units: K/uL 0.00   Sodium Latest Ref Range: 135 - 145 mmol/L 137    Potassium Latest Ref Range: 3.6 - 5.5 mmol/L 4.0   Chloride Latest Ref Range: 96 - 112 mmol/L 106   Co2 Latest Ref Range: 20 - 33 mmol/L 25   Anion Gap Latest Ref Range: 0.0 - 11.9  6.0   Glucose Latest Ref Range: 65 - 99 mg/dL 80   Bun Latest Ref Range: 8 - 22 mg/dL 16   Creatinine Latest Ref Range: 0.50 - 1.40 mg/dL 0.73   GFR If  Latest Ref Range: >60 mL/min/1.73 m 2 >60   GFR If Non  Latest Ref Range: >60 mL/min/1.73 m 2 >60   Calcium Latest Ref Range: 8.5 - 10.5 mg/dL 9.1   AST(SGOT) Latest Ref Range: 12 - 45 U/L 13   ALT(SGPT) Latest Ref Range: 2 - 50 U/L 12   Alkaline Phosphatase Latest Ref Range: 30 - 99 U/L 32   Total Bilirubin Latest Ref Range: 0.1 - 1.5 mg/dL 0.5   Albumin Latest Ref Range: 3.2 - 4.9 g/dL 4.1   Total Protein Latest Ref Range: 6.0 - 8.2 g/dL 6.6   Globulin Latest Ref Range: 1.9 - 3.5 g/dL 2.5   A-G Ratio Latest Units: g/dL 1.6   Fasting Status Unknown Fasting   Cholesterol,Tot Latest Ref Range: 100 - 199 mg/dL 170   Triglycerides Latest Ref Range: 0 - 149 mg/dL 94   HDL Latest Ref Range: >=40 mg/dL 62   LDL Latest Ref Range: <100 mg/dL 89   25-Hydroxy   Vitamin D 25 Latest Ref Range: 30 - 100 ng/mL 45   TSH Latest Ref Range: 0.380 - 5.330 uIU/mL 2.190         ASSESSMENT/PLAN:    38 y.o.female with psoriatic arthritis.     1. Psoriatic arthritis (HCC)-as below.   -Patient will schedule with rheumatology.     2. Chest wall pain- appears costochondral component and related to thoracic spine. Unclear if component due to psoriatic arthritis or other inflammatory condition.   -Recommend modify activities. Recommend consider continue acupuncture therapy weekly and chiropractic or physical therapy.  WESTERGREN SED RATE    URIC ACID    CRP QUANTITIVE (NON-CARDIAC)   3. Stress reaction- stable.   -Continue with psychiatrist follow up. Discussed limiting methyl folate supplement therapy at this time, consider decreasing and monitoring. May reassess and increase dose  in future.     4. MTHFR gene mutation (HCC)- as above.      5. Leukopenia, unspecified type- mild, overall stable compared to 2017 labs in system.   -Will continue to monitor. Repeat labs in 3 months, sooner if any new symptoms occur.      6. Fatigue, unspecified type- likely multifactorial.   -Will assess EBV status. Continue to work on self care. Discussed healthy diet, routine exercise, adequate sleep and stress management.   EBV CHRONIC/ACTIVE INFECTION   7. Iron deficiency anemia due to chronic blood loss- stable labs. She is on iron supplement which she will continue at this time. Monitor.          Return in about 1 month (around 6/9/2019).    This medical record contains text that has been entered with the assistance of computer voice recognition and dictation software.  Therefore, it may contain unintended errors in text, spelling, punctuation, or grammar.

## 2019-06-05 ENCOUNTER — TELEPHONE (OUTPATIENT)
Dept: MEDICAL GROUP | Facility: IMAGING CENTER | Age: 39
End: 2019-06-05

## 2019-06-05 NOTE — TELEPHONE ENCOUNTER
Called and spoke with patient and let her know labs were non-fasting. No other questions at this time.

## 2019-06-05 NOTE — TELEPHONE ENCOUNTER
----- Message from Shantel Ferguson sent at 6/5/2019 11:00 AM PDT -----  Regarding: Lab work request  Contact: [phone]  Pt needed to know whether or not the labs [facility] ordered on 05/9/19 were fasting or not.     Please call patient to confirm     Thanks!  Shantel

## 2019-06-12 ENCOUNTER — TELEPHONE (OUTPATIENT)
Dept: MEDICAL GROUP | Facility: IMAGING CENTER | Age: 39
End: 2019-06-12

## 2019-06-12 NOTE — TELEPHONE ENCOUNTER
Spoke to pt. Notified her that Dr. Duke would prefer her to get her urine tested, especially since she has never had a uti before. Pt reports that burning sensation has gone away, but noticed that urine was cloudy and odorous today. She continues to have urgency on and off and has a feeling of pressure in her abdomen. Pt will try to go to urgent care tonight. If she is unable, will notify us in the morning for possible antibiotic course.

## 2019-06-12 NOTE — TELEPHONE ENCOUNTER
----- Message from Nubia Arce sent at 6/12/2019  3:44 PM PDT -----  Regarding: RE: Non-Urgent Medical Question  Contact: [phone]  Hi,  Thank you for your reply. I’m taking care of my grandparents while I’m here so I won’t be able to get into urgent care until Friday when I’m back in [place].     Thank you,  Nubia  ----- Message -----  From: Cait Hampton R.N.  Sent: 6/11/2019  8:54 AM PDT  To: Nubia Arce  Subject: RE: Non-Urgent Medical Question  Devon Delacruz,     I'm sorry to hear you're feeling bad. It does sound like a UTI. Typically it is best to test a urine sample to check for infection so we can give you the best treatment. Is there an urgent care that you could visit?    Cait    ----- Message -----     From: Nubia Arce     Sent: 6/11/2019  7:56 AM PDT       To: Yessica Duke M.D.  Subject: Non-Urgent Medical Question    Good morning Dr. Duke,  I am out of town visiting family in the [place] area. Last night I woke up to go to the bathroom and peeing was a bit painful. Now I am having the constant urgency to pee even though there’s not a lot there. It is still burning when I go too. I don’t think I’ve ever had a UTI, but it sure feels a lot like one. (My  and I had intercourse two nights ago, so maybe that is what caused it). I won’t be back in [place] until Friday. What do you recommend I do? I’ve been trying to drink a ton of water in the meantime.     Thanks in advance!   Nubia Arce

## 2019-06-13 ENCOUNTER — TELEPHONE (OUTPATIENT)
Dept: MEDICAL GROUP | Facility: IMAGING CENTER | Age: 39
End: 2019-06-13

## 2019-06-13 NOTE — TELEPHONE ENCOUNTER
Called and spoke with patient. Has not called pharmacy this morning to see if antibiotic was filled. Let her know to call them and if they still do not have it, we will call one in for her. Patient agrees and will give office a call back after calling the pharmacy.

## 2019-07-11 ENCOUNTER — OFFICE VISIT (OUTPATIENT)
Dept: MEDICAL GROUP | Facility: IMAGING CENTER | Age: 39
End: 2019-07-11
Payer: COMMERCIAL

## 2019-07-11 ENCOUNTER — HOSPITAL ENCOUNTER (OUTPATIENT)
Facility: MEDICAL CENTER | Age: 39
End: 2019-07-11
Attending: FAMILY MEDICINE
Payer: COMMERCIAL

## 2019-07-11 VITALS
HEART RATE: 88 BPM | TEMPERATURE: 99.2 F | WEIGHT: 146.2 LBS | RESPIRATION RATE: 12 BRPM | BODY MASS INDEX: 24.96 KG/M2 | HEIGHT: 64 IN | SYSTOLIC BLOOD PRESSURE: 118 MMHG | DIASTOLIC BLOOD PRESSURE: 76 MMHG | OXYGEN SATURATION: 100 %

## 2019-07-11 DIAGNOSIS — M94.0 COSTOCHONDRITIS: ICD-10-CM

## 2019-07-11 DIAGNOSIS — R92.30 DENSE BREAST TISSUE: ICD-10-CM

## 2019-07-11 DIAGNOSIS — Z86.19 HISTORY OF HERPES GENITALIS: ICD-10-CM

## 2019-07-11 DIAGNOSIS — R10.2 PELVIC PAIN: ICD-10-CM

## 2019-07-11 DIAGNOSIS — N90.89 LABIAL IRRITATION: ICD-10-CM

## 2019-07-11 DIAGNOSIS — F41.9 ANXIETY: ICD-10-CM

## 2019-07-11 DIAGNOSIS — D72.819 LEUKOPENIA, UNSPECIFIED TYPE: ICD-10-CM

## 2019-07-11 DIAGNOSIS — R35.0 URINARY FREQUENCY: ICD-10-CM

## 2019-07-11 DIAGNOSIS — R07.89 CHEST WALL PAIN: ICD-10-CM

## 2019-07-11 LAB
APPEARANCE UR: CLEAR
BILIRUB UR STRIP-MCNC: NEGATIVE MG/DL
COLOR UR AUTO: YELLOW
GLUCOSE UR STRIP.AUTO-MCNC: NEGATIVE MG/DL
KETONES UR STRIP.AUTO-MCNC: NEGATIVE MG/DL
LEUKOCYTE ESTERASE UR QL STRIP.AUTO: NEGATIVE
NITRITE UR QL STRIP.AUTO: NEGATIVE
PH UR STRIP.AUTO: 6 [PH] (ref 5–8)
PROT UR QL STRIP: NEGATIVE MG/DL
RBC UR QL AUTO: NEGATIVE
SP GR UR STRIP.AUTO: 1
UROBILINOGEN UR STRIP-MCNC: 0.2 MG/DL

## 2019-07-11 PROCEDURE — 81002 URINALYSIS NONAUTO W/O SCOPE: CPT | Performed by: FAMILY MEDICINE

## 2019-07-11 PROCEDURE — 87491 CHLMYD TRACH DNA AMP PROBE: CPT

## 2019-07-11 PROCEDURE — 99215 OFFICE O/P EST HI 40 MIN: CPT | Performed by: FAMILY MEDICINE

## 2019-07-11 PROCEDURE — 87480 CANDIDA DNA DIR PROBE: CPT

## 2019-07-11 PROCEDURE — 87591 N.GONORRHOEAE DNA AMP PROB: CPT

## 2019-07-11 PROCEDURE — 87510 GARDNER VAG DNA DIR PROBE: CPT

## 2019-07-11 PROCEDURE — 87660 TRICHOMONAS VAGIN DIR PROBE: CPT

## 2019-07-11 RX ORDER — ACYCLOVIR 400 MG/1
400 TABLET ORAL 2 TIMES DAILY
Refills: 0 | Status: CANCELLED | OUTPATIENT
Start: 2019-07-11

## 2019-07-11 NOTE — PROGRESS NOTES
SUBJECTIVE:      Chief Complaint   Patient presents with   • Urinary Frequency   • Fever     feels feverish    • Other     possible herpes outbreak, x 4 days       HPI:     Nubia Arce is a 38 y.o. female here for urinary frequency.   Also noted feeling feverish. Has history of genital herpes and thinks she may be having an outbreak. Has noticed slight irritation of left labial region the past 4 days.  Has felt feverish as well, thus was concerned. Felt she had some chills today.  Has not had outbreak in a long time. Denies symptoms of burning in area. States usually intercourse may provoke symptoms. Has been on acyclovir and valtrex in past without issues.   Denies sore throat, skin rash, cough or diarrhea. States her son recently went to the ER for a temperature of 106 F with unclear etiology, but he is now better.     Had some dysuria a few weeks ago. Was seen at urgent care and treated with antibiotic therapy.   Had culture done at that time. Was visiting family in [place].   [place] her UTI cleared up. 2 weeks later felt tenderness again and urgency to urinate. No prior diagnosis of OAB.   Has noticed tenderness in lower pelvic region on and off since UTI, but she was ovulating.   Adenomyosis was diagnosed in past as she has had some prior symptoms and was evaluated with gynecology.   No surgical exploration was completed then.   LMP- finished 2 weeks. Cycle is every 26 days or so.   Last year was spotting between periods but states she was better after biopsy.  No abnormal vaginal discharge or bleeding currently.  Previously had chest imaging.  With left side ovulation- has pulling/tugging pain on same side of chest/diaphragm area. For 1-2 days with ovulation. Has had painful ovulation in past. Has had symptoms of pressure associated in past. Feels some symptoms today.   She is  and monogamous.   Notes she drinks a lot of black and green tea.   Also has costchondritis- acupuncture helped with   Jin, but has not discussed her issues with ovulation. Diet changes also helped her costochondritis.  Drinks more water.      Notes she has had more anxiety the past couple weeks.   Notes she sometimes has to push to urinate. No current blood in urine.     Has seen gynecology- recommend repeat breast ultrasound in 6 months. Last 2/2019. Has dense breast tissue. She will follow up with gynecology on this.     WBC- decreased. Past 2 years with low dose naltrexone- seems wbc improved.   Keeps gaining weight. Not exercising regularly currently.       ROS:  Denies any recent fevers or chills. No nausea or vomiting. No diarrhea. No chest pains or shortness of breath. No lower extremity edema.    Current Outpatient Prescriptions on File Prior to Visit   Medication Sig Dispense Refill   • IRON PO Take  by mouth.     • Ascorbic Acid (VITAMIN C) 1000 MG Tab Take  by mouth.     • 5-Methyltetrahydrofolate (METHYL FOLATE) Powder 7.5 mg by Does not apply route every day.     • Turmeric 450 MG Cap Take  by mouth.     • Omega-3 Fatty Acids (FISH OIL) 1000 MG Cap capsule Take 1,000 mg by mouth 3 times a day, with meals.     • zinc sulfate 1 MG/ML Solution by Intravenous route.     • cyanocobalamin (VITAMIN B-12) 500 MCG Tab Take 500 mcg by mouth every day.     • vitamin D (CHOLECALCIFEROL) 1000 UNIT Tab Take 6,000 Units by mouth every day.     • sertraline (ZOLOFT) 25 MG tablet TAKE 1 TABLET BY MOUTH EVERY DAY (Patient not taking: Reported on 7/11/2019) 90 Tab 1   • naproxen (NAPROSYN) 500 MG Tab TAKE 1 TAB BY MOUTH 2 TIMES DAILY WITH MEALS AS NEEDED. 30 Tab 0     No current facility-administered medications on file prior to visit.        Allergies   Allergen Reactions   • Doxycycline Vomiting     2016        Patient Active Problem List    Diagnosis Date Noted   • Dense breast tissue 07/12/2019   • MTHFR gene mutation (HCC)    • Breast tenderness in female 01/17/2019   • Acquired hypothyroidism 11/07/2018   • Iron deficiency  "anemia due to chronic blood loss 07/27/2018   • Dysesthesia 05/25/2018   • Meningioma ([facility]) 05/25/2018   • Lymphadenopathy of head and neck 02/01/2018   • Abnormal brain MRI 01/23/2018   • Pineal gland cyst 01/23/2018   • Dizziness 12/29/2017   • Seasonal depression (HCC) 12/29/2017   • Numbness and tingling of left side of face 09/05/2017   • Decreased sensation of lower extremity 06/30/2017   • Gastroesophageal reflux disease 06/30/2017   • Neck pain 03/13/2017   • Psoriatic arthritis ([facility]) 02/09/2017   • Left upper quadrant pain 02/09/2017       Past Medical History:   Diagnosis Date   • Adenomyosis     aug 2018   • Allergy    • Anxiety    • Depression    • Generalized hypermobility of joints    • Genital HSV    • HLA B27 (HLA B27 positive)    • Meningioma ([facility])     Jan 2017   • MTHFR gene mutation ([facility])    • 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   • Psoriatic arthritis ([facility])        OBJECTIVE:   /76 (BP Location: Left arm, Patient Position: Sitting, BP Cuff Size: Adult)   Pulse 88   Temp 37.3 °C (99.2 °F) (Temporal)   Resp 12   Ht 1.626 m (5' 4\")   Wt 66.3 kg (146 lb 3.2 oz)   SpO2 100%   BMI 25.10 kg/m²   General: Well-developed well-nourished female, no acute distress  Neck: supple, no lymphadenopathy- cervical or supraclavicular, no thyromegaly  Cardiovascular: regular rate and rhythm, no murmurs, gallops, rubs  Lungs: clear to auscultation bilaterally, no wheezes, crackles, or rhonchi  Abdomen: +bowel sounds, soft, nontender, nondistended, no rebound, no guarding, no hepatosplenomegaly  Extremities: no cyanosis, clubbing, edema  Skin: Warm and dry  Psych: appropriate mood and affect  PELVIC EXAM: External genitalia and vagina normal, no active lesions noted. Normal appearing cervix. No abnormal discharge. No cervical motion tenderness. Bimanual exam without adnexal masses or tenderness to palpation.     Urine poc: negative.     ASSESSMENT/PLAN:    38 y.o.female with hypothyroidism and anxiety.     1. Labial " irritation- no active findings on exam for HSV.   -Consider use of barrier cream of area.      2. History of herpes genitalis  -Plan to prescribe acyclovir for future use as needed.      3. Urinary frequency- unclear etiology.   -Will check hgba1c. Discussed modifying diet, avoid caffeine and other aggravating factors. Consider due to OAB.  Recommend regular kegel exercises. Discussed consider PVR imaging if no further improvements.  HEMOGLOBIN A1C    POCT Urinalysis   4. Pelvic pain -appears more discomfort which may be related to ovulation.   -Swabs completed. Recommend seek further therapy with acupuncture regarding symptoms related to ovulation. She will otherwise plan to complete routine pap with her gynecologist when due and discuss further.  VAGINAL PATHOGENS DNA PANEL    CHLAMYDIA/GC PCR URINE OR SWAB   5. Dense breast tissue - she will follow up with gynecology regarding repeat imaging.     6. Anxiety   -Discussed management of current stressors and anxiety. Monitor.     7. Leukopenia, unspecified type- mild.   -Monitor.   CBC WITH DIFFERENTIAL     8. Chest wall pain-pulling sensation associated with ovulation of unclear etiology. Recommend further discuss with gynecology. Query possible endometriosis.       9. Costochondritis- improved.        Return in about 2 weeks (around 7/25/2019).    This medical record contains text that has been entered with the assistance of computer voice recognition and dictation software.  Therefore, it may contain unintended errors in text, spelling, punctuation, or grammar.    > 55 minutes face to face time spent with this patient of which > 30  minutes spent on counseling and coordination of care as above, excluding any time for procedures.

## 2019-07-12 ENCOUNTER — TELEPHONE (OUTPATIENT)
Dept: MEDICAL GROUP | Facility: IMAGING CENTER | Age: 39
End: 2019-07-12

## 2019-07-12 DIAGNOSIS — B37.31 VAGINAL YEAST INFECTION: ICD-10-CM

## 2019-07-12 PROBLEM — R92.30 DENSE BREAST TISSUE: Status: ACTIVE | Noted: 2019-07-12

## 2019-07-12 LAB
CANDIDA DNA VAG QL PROBE+SIG AMP: POSITIVE
G VAGINALIS DNA VAG QL PROBE+SIG AMP: NEGATIVE
T VAGINALIS DNA VAG QL PROBE+SIG AMP: NEGATIVE

## 2019-07-12 RX ORDER — FLUCONAZOLE 150 MG/1
150 TABLET ORAL ONCE
Qty: 1 TAB | Refills: 0 | Status: SHIPPED | OUTPATIENT
Start: 2019-07-12 | End: 2019-07-25 | Stop reason: SDUPTHER

## 2019-07-12 NOTE — TELEPHONE ENCOUNTER
Left msg for pt to check mychart regarding test results and recommendations.    Patient off the floor for forensics evaluation.

## 2019-07-13 LAB
C TRACH DNA SPEC QL NAA+PROBE: NEGATIVE
N GONORRHOEA DNA SPEC QL NAA+PROBE: NEGATIVE
SPECIMEN SOURCE: NORMAL

## 2019-08-13 ENCOUNTER — HOSPITAL ENCOUNTER (OUTPATIENT)
Dept: LAB | Facility: MEDICAL CENTER | Age: 39
End: 2019-08-13
Attending: FAMILY MEDICINE
Payer: COMMERCIAL

## 2019-08-13 DIAGNOSIS — R53.83 FATIGUE, UNSPECIFIED TYPE: ICD-10-CM

## 2019-08-13 DIAGNOSIS — R07.89 CHEST WALL PAIN: ICD-10-CM

## 2019-08-13 DIAGNOSIS — D72.819 LEUKOPENIA, UNSPECIFIED TYPE: ICD-10-CM

## 2019-08-13 DIAGNOSIS — R35.0 URINARY FREQUENCY: ICD-10-CM

## 2019-08-13 LAB
BASOPHILS # BLD AUTO: 0.7 % (ref 0–1.8)
BASOPHILS # BLD: 0.04 K/UL (ref 0–0.12)
CRP SERPL HS-MCNC: 0.04 MG/DL (ref 0–0.75)
EOSINOPHIL # BLD AUTO: 0.07 K/UL (ref 0–0.51)
EOSINOPHIL NFR BLD: 1.2 % (ref 0–6.9)
ERYTHROCYTE [DISTWIDTH] IN BLOOD BY AUTOMATED COUNT: 41.4 FL (ref 35.9–50)
ERYTHROCYTE [SEDIMENTATION RATE] IN BLOOD BY WESTERGREN METHOD: 3 MM/HOUR (ref 0–20)
EST. AVERAGE GLUCOSE BLD GHB EST-MCNC: 117 MG/DL
HBA1C MFR BLD: 5.7 % (ref 0–5.6)
HCT VFR BLD AUTO: 43.2 % (ref 37–47)
HGB BLD-MCNC: 14 G/DL (ref 12–16)
IMM GRANULOCYTES # BLD AUTO: 0 K/UL (ref 0–0.11)
IMM GRANULOCYTES NFR BLD AUTO: 0 % (ref 0–0.9)
LYMPHOCYTES # BLD AUTO: 1.56 K/UL (ref 1–4.8)
LYMPHOCYTES NFR BLD: 27.6 % (ref 22–41)
MCH RBC QN AUTO: 27.1 PG (ref 27–33)
MCHC RBC AUTO-ENTMCNC: 32.4 G/DL (ref 33.6–35)
MCV RBC AUTO: 83.7 FL (ref 81.4–97.8)
MONOCYTES # BLD AUTO: 0.34 K/UL (ref 0–0.85)
MONOCYTES NFR BLD AUTO: 6 % (ref 0–13.4)
NEUTROPHILS # BLD AUTO: 3.65 K/UL (ref 2–7.15)
NEUTROPHILS NFR BLD: 64.5 % (ref 44–72)
NRBC # BLD AUTO: 0 K/UL
NRBC BLD-RTO: 0 /100 WBC
PLATELET # BLD AUTO: 277 K/UL (ref 164–446)
PMV BLD AUTO: 10.3 FL (ref 9–12.9)
QORDR QORDR: NORMAL
RBC # BLD AUTO: 5.16 M/UL (ref 4.2–5.4)
URATE SERPL-MCNC: 4 MG/DL (ref 1.9–8.2)
WBC # BLD AUTO: 5.7 K/UL (ref 4.8–10.8)

## 2019-08-13 PROCEDURE — 86664 EPSTEIN-BARR NUCLEAR ANTIGEN: CPT

## 2019-08-13 PROCEDURE — 36415 COLL VENOUS BLD VENIPUNCTURE: CPT

## 2019-08-13 PROCEDURE — 86663 EPSTEIN-BARR ANTIBODY: CPT

## 2019-08-13 PROCEDURE — 86140 C-REACTIVE PROTEIN: CPT

## 2019-08-13 PROCEDURE — 85652 RBC SED RATE AUTOMATED: CPT

## 2019-08-13 PROCEDURE — 85025 COMPLETE CBC W/AUTO DIFF WBC: CPT

## 2019-08-13 PROCEDURE — 84550 ASSAY OF BLOOD/URIC ACID: CPT

## 2019-08-13 PROCEDURE — 83036 HEMOGLOBIN GLYCOSYLATED A1C: CPT

## 2019-08-13 PROCEDURE — 86665 EPSTEIN-BARR CAPSID VCA: CPT

## 2019-08-15 LAB
EBV EA-D IGG SER-ACNC: <5 U/ML (ref 0–10.9)
EBV NA IGG SER IA-ACNC: 369 U/ML (ref 0–21.9)
EBV VCA IGG SER IA-ACNC: 49.7 U/ML (ref 0–21.9)

## 2019-08-22 ENCOUNTER — HOSPITAL ENCOUNTER (OUTPATIENT)
Facility: MEDICAL CENTER | Age: 39
End: 2019-08-22
Attending: FAMILY MEDICINE
Payer: COMMERCIAL

## 2019-08-22 ENCOUNTER — OFFICE VISIT (OUTPATIENT)
Dept: MEDICAL GROUP | Facility: IMAGING CENTER | Age: 39
End: 2019-08-22
Payer: COMMERCIAL

## 2019-08-22 VITALS
RESPIRATION RATE: 14 BRPM | TEMPERATURE: 99.2 F | OXYGEN SATURATION: 100 % | BODY MASS INDEX: 25.13 KG/M2 | DIASTOLIC BLOOD PRESSURE: 78 MMHG | HEART RATE: 83 BPM | HEIGHT: 64 IN | SYSTOLIC BLOOD PRESSURE: 118 MMHG | WEIGHT: 147.2 LBS

## 2019-08-22 DIAGNOSIS — R59.9 GLANDS SWOLLEN: ICD-10-CM

## 2019-08-22 DIAGNOSIS — M94.0 COSTOCHONDRITIS: ICD-10-CM

## 2019-08-22 DIAGNOSIS — R89.9 ABNORMAL LABORATORY TEST: ICD-10-CM

## 2019-08-22 DIAGNOSIS — F33.8 SEASONAL DEPRESSION (HCC): ICD-10-CM

## 2019-08-22 DIAGNOSIS — N90.89 LABIAL IRRITATION: ICD-10-CM

## 2019-08-22 DIAGNOSIS — Z20.818 EXPOSURE TO STREP THROAT: ICD-10-CM

## 2019-08-22 DIAGNOSIS — F43.0 STRESS REACTION: ICD-10-CM

## 2019-08-22 DIAGNOSIS — R53.83 FATIGUE, UNSPECIFIED TYPE: ICD-10-CM

## 2019-08-22 DIAGNOSIS — F41.9 ANXIETY: ICD-10-CM

## 2019-08-22 LAB
INT CON NEG: NEGATIVE
INT CON POS: POSITIVE
S PYO AG THROAT QL: NEGATIVE

## 2019-08-22 PROCEDURE — 99215 OFFICE O/P EST HI 40 MIN: CPT | Performed by: FAMILY MEDICINE

## 2019-08-22 PROCEDURE — 87880 STREP A ASSAY W/OPTIC: CPT | Performed by: FAMILY MEDICINE

## 2019-08-22 PROCEDURE — 87070 CULTURE OTHR SPECIMN AEROBIC: CPT

## 2019-08-22 NOTE — PROGRESS NOTES
"SUBJECTIVE:        Chief Complaint   Patient presents with   • Swollen Glands     x 2-3 days    • Cold Exposure     son has strep throat       HPI:     Nubia Arce is a 38 y.o. female with psoriatic arthritis, anxiety/ seasonal depression, meningioma, MTHFR gene mutation, and hypothyroidism here for symptoms of swollen gland and fullness of throat past 2-3 days.   Son was diagnosed with strep about 3 days ago.   Feels slight fullness in throat. Felt feverish but no fever noted. Has felt more tired.   Notes that she has had some more intermittent throat symptoms in the past. No sore throat.   No coughing, sneezing, or rhinorrhea. No diarrhea. No emesis.   Some nausea last couple days, but has had intermittent issues in past.     Labial irritation improved after 2 doses of fluconazole. Was found to have yeast infection.     HgbA1c elevated, has been more fruits lately.   Mostly fruits/vegetables/meat/fish. Occasional white rice. Corn tortilla.   Increasing exercise, has been doing more than what she was doing about a year ago. Tries to walk more. Sometimes feels tired or sore after exercise.   Thinking of joining \"juice [employer].\"  Moderate yoga class in park- felt wiped out physically afterward.   Sore and rib cage hurt- has had issues in past. Has been told left side of rib cage is more prominent.     Anxiety- has been seeing counseling therapy and recently further discussed her stressful experiences.    is done a lot due to his work. Finds she may feel resentful at times. Will be looking into marriage counseling. 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  Tends to have seasonal depression and takes zoloft during that time, but has been feeling more emotional lately. Wondering if she should take the medication now. Has been seeing psychiatrist and counseling therapy.      Had blood work done.     ROS:  Denies any recent fevers or chills. No nausea or vomiting. No diarrhea. " No chest pains or shortness of breath. No lower extremity edema.    Current Outpatient Medications on File Prior to Visit   Medication Sig Dispense Refill   • IRON PO Take  by mouth.     • Ascorbic Acid (VITAMIN C) 1000 MG Tab Take  by mouth.     • 5-Methyltetrahydrofolate (METHYL FOLATE) Powder 7.5 mg by Does not apply route every day.     • Turmeric 450 MG Cap Take  by mouth.     • Omega-3 Fatty Acids (FISH OIL) 1000 MG Cap capsule Take 1,000 mg by mouth 3 times a day, with meals.     • cyanocobalamin (VITAMIN B-12) 500 MCG Tab Take 500 mcg by mouth every day.     • vitamin D (CHOLECALCIFEROL) 1000 UNIT Tab Take 6,000 Units by mouth every day.     • acyclovir (ZOVIRAX) 400 MG tablet Take 1 Tab by mouth 3 times a day. Take at first sign of symptoms for 5 days. (Patient not taking: Reported on 8/22/2019) 15 Tab 2   • sertraline (ZOLOFT) 25 MG tablet TAKE 1 TABLET BY MOUTH EVERY DAY (Patient not taking: Reported on 7/11/2019) 90 Tab 1   • naproxen (NAPROSYN) 500 MG Tab TAKE 1 TAB BY MOUTH 2 TIMES DAILY WITH MEALS AS NEEDED. (Patient not taking: Reported on 8/22/2019) 30 Tab 0   • zinc sulfate 1 MG/ML Solution by Intravenous route.       No current facility-administered medications on file prior to visit.        Allergies   Allergen Reactions   • Doxycycline Vomiting     2016        Patient Active Problem List    Diagnosis Date Noted   • Dense breast tissue 07/12/2019   • MTHFR gene mutation ([facility])    • Breast tenderness in female 01/17/2019   • Acquired hypothyroidism 11/07/2018   • Iron deficiency anemia due to chronic blood loss 07/27/2018   • Dysesthesia 05/25/2018   • Meningioma ([facility]) 05/25/2018   • Lymphadenopathy of head and neck 02/01/2018   • Abnormal brain MRI 01/23/2018   • Pineal gland cyst 01/23/2018   • Dizziness 12/29/2017   • Seasonal depression ([facility]) 12/29/2017   • Numbness and tingling of left side of face 09/05/2017   • Decreased sensation of lower extremity 06/30/2017   • Gastroesophageal reflux  "disease 06/30/2017   • Neck pain 03/13/2017   • Psoriatic arthritis (HCC) 02/09/2017   • Left upper quadrant pain 02/09/2017       Past Medical History:   Diagnosis Date   • Adenomyosis     aug 2018   • Allergy    • Anxiety    • Depression    • Generalized hypermobility of joints    • Genital HSV    • HLA B27 (HLA B27 positive)    • Meningioma (HCC)     Jan 2017   • MTHFR gene mutation (HCC)    • 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   • Psoriatic arthritis (HCC)          OBJECTIVE:   /78 (BP Location: Left arm, Patient Position: Sitting, BP Cuff Size: Adult)   Pulse 83   Temp 37.3 °C (99.2 °F) (Temporal)   Resp 14   Ht 1.626 m (5' 4\")   Wt 66.8 kg (147 lb 3.2 oz)   SpO2 100%   BMI 25.27 kg/m²   General: Well-developed well-nourished female, no acute distress  HEENT: oropharynx clear, eyes clear, TMs clear and intact  Neck: supple, slightly enlarged gland on L> R anterior cervical region with mild TTP.  No thyromegaly  Cardiovascular: regular rate and rhythm, no murmurs, gallops, rubs  Lungs: clear to auscultation bilaterally, no wheezes, crackles, or rhonchi  Abdomen: +bowel sounds, soft, nontender, nondistended, no rebound, no guarding, no hepatosplenomegaly. Minimal prominence of left side costochondral region compared to right side.   Extremities: no cyanosis, clubbing, edema  Skin: Warm and dry  Psych: appropriate mood and affect       Ref. Range 8/13/2019 09:47 8/22/2019 14:30   WBC Latest Ref Range: 4.8 - 10.8 K/uL 5.7    RBC Latest Ref Range: 4.20 - 5.40 M/uL 5.16    Hemoglobin Latest Ref Range: 12.0 - 16.0 g/dL 14.0    Hematocrit Latest Ref Range: 37.0 - 47.0 % 43.2    MCV Latest Ref Range: 81.4 - 97.8 fL 83.7    MCH Latest Ref Range: 27.0 - 33.0 pg 27.1    MCHC Latest Ref Range: 33.6 - 35.0 g/dL 32.4 (L)    RDW Latest Ref Range: 35.9 - 50.0 fL 41.4    Platelet Count Latest Ref Range: 164 - 446 K/uL 277    MPV Latest Ref Range: 9.0 - 12.9 fL 10.3    Neutrophils-Polys Latest Ref Range: 44.00 - 72.00 % 64.50 "    Neutrophils (Absolute) Latest Ref Range: 2.00 - 7.15 K/uL 3.65    Lymphocytes Latest Ref Range: 22.00 - 41.00 % 27.60    Lymphs (Absolute) Latest Ref Range: 1.00 - 4.80 K/uL 1.56    Monocytes Latest Ref Range: 0.00 - 13.40 % 6.00    Monos (Absolute) Latest Ref Range: 0.00 - 0.85 K/uL 0.34    Eosinophils Latest Ref Range: 0.00 - 6.90 % 1.20    Eos (Absolute) Latest Ref Range: 0.00 - 0.51 K/uL 0.07    Basophils Latest Ref Range: 0.00 - 1.80 % 0.70    Baso (Absolute) Latest Ref Range: 0.00 - 0.12 K/uL 0.04    Immature Granulocytes Latest Ref Range: 0.00 - 0.90 % 0.00    Immature Granulocytes (abs) Latest Ref Range: 0.00 - 0.11 K/uL 0.00    Nucleated RBC Latest Units: /100 WBC 0.00    NRBC (Absolute) Latest Units: K/uL 0.00    Sed Rate Westergren Latest Ref Range: 0 - 20 mm/hour 3    Uric Acid Latest Ref Range: 1.9 - 8.2 mg/dL 4.0    Glycohemoglobin Latest Ref Range: 0.0 - 5.6 % 5.7 (H)    Estim. Avg Glu Latest Units: mg/dL 117    Ebv Ab Vca, Igg Latest Ref Range: 0.0 - 21.9 U/mL 49.7 (H)    Ebv Ea-Igg Latest Ref Range: 0.0 - 10.9 U/mL <5.0    Ebv Na-Abs Latest Ref Range: 0.0 - 21.9 U/mL 369.0 (H)    Stat C-Reactive Protein Latest Ref Range: 0.00 - 0.75 mg/dL 0.04    Rapid Strep Screen Unknown  Negative       ASSESSMENT/PLAN:    38 y.o.female with psoriatic arthritis, anxiety/ seasonal depression, meningioma, MTHFR gene mutation, and hypothyroidism.     1. Glands swollen- may be viral etiology, but with recent exposure to strep. POC strep negative. Culture done.   -Discussed routine supportive care. Monitor. Follow up if no further improvement or any worsening symptoms.   POCT Rapid Strep A    CULTURE THROAT   2. Exposure to strep throat - as above.  POCT Rapid Strep A    CULTURE THROAT   3. Labial irritation- resolved after treatment of yeast infection.     4. Abnormal laboratory test - elevated HgbA1c, mild.   -Recommend modification of diet and discussed routine exercise. Monitor in future.     5. Fatigue,  unspecified type- likely multifactorial. Component associated with stress reaction. Reviewed recent lab work, EBV findings appear consistent with past infection.  Discuss management of energy throughout day and week. Continue to monitor.      6. Stress reaction- multifactorial.   -Counseled on management of symptoms. Discussed source to obtain MFT. Discussed gradual changes with exercise and lifestyle, setting small goals and expectations.       7. Seasonal depression (HCC)   -Discussed starting her zoloft therapy at this time. She will discuss further with psychiatry and psychology.     8. Anxiety - as above.     9. Costochondritis-stable.   -Discussed recommended exercise activities.     Rest, keep well hydrated, and discussed over-the-counter medications to use for symptom relief.  Follow up if no improvement in symptoms in 1-2 weeks.  Otherwise routine follow up in 2-3 months recommended.       This medical record contains text that has been entered with the assistance of computer voice recognition and dictation software.  Therefore, it may contain unintended errors in text, spelling, punctuation, or grammar.      > 40 minutes face to face time spent with this patient of which > 30  minutes spent on counseling and coordination of care as above, excluding any time for procedures.

## 2019-08-23 DIAGNOSIS — Z20.818 EXPOSURE TO STREP THROAT: ICD-10-CM

## 2019-08-25 LAB
BACTERIA SPEC RESP CULT: NORMAL
SIGNIFICANT IND 70042: NORMAL
SITE SITE: NORMAL
SOURCE SOURCE: NORMAL

## 2019-08-28 DIAGNOSIS — L40.50 PSORIATIC ARTHRITIS (HCC): ICD-10-CM

## 2019-08-28 DIAGNOSIS — R07.89 CHEST WALL PAIN: ICD-10-CM

## 2019-08-28 DIAGNOSIS — M94.0 COSTOCHONDRITIS: ICD-10-CM

## 2019-09-17 ENCOUNTER — TELEPHONE (OUTPATIENT)
Dept: MEDICAL GROUP | Facility: IMAGING CENTER | Age: 39
End: 2019-09-17

## 2019-09-17 NOTE — TELEPHONE ENCOUNTER
Received fax from Dr. Yessica Aguilera office stating they are unable to accept patient as a new patient due to having a limited capacity. They suggest trying [facility], Dr. Burden at [facility] or [facility].

## 2019-10-17 ENCOUNTER — OFFICE VISIT (OUTPATIENT)
Dept: MEDICAL GROUP | Facility: IMAGING CENTER | Age: 39
End: 2019-10-17
Payer: COMMERCIAL

## 2019-10-17 VITALS
HEIGHT: 64 IN | SYSTOLIC BLOOD PRESSURE: 124 MMHG | DIASTOLIC BLOOD PRESSURE: 82 MMHG | HEART RATE: 95 BPM | BODY MASS INDEX: 25.1 KG/M2 | WEIGHT: 147 LBS | RESPIRATION RATE: 14 BRPM | OXYGEN SATURATION: 100 % | TEMPERATURE: 99.8 F

## 2019-10-17 DIAGNOSIS — R20.2 TINGLING IN EXTREMITIES: ICD-10-CM

## 2019-10-17 DIAGNOSIS — L40.50 PSORIATIC ARTHRITIS (HCC): ICD-10-CM

## 2019-10-17 DIAGNOSIS — R00.2 PALPITATIONS: ICD-10-CM

## 2019-10-17 DIAGNOSIS — F43.9 STRESS: ICD-10-CM

## 2019-10-17 DIAGNOSIS — R53.1 WEAKNESS: ICD-10-CM

## 2019-10-17 DIAGNOSIS — R27.9 COORDINATION PROBLEM: ICD-10-CM

## 2019-10-17 DIAGNOSIS — F41.9 ANXIETY: ICD-10-CM

## 2019-10-17 DIAGNOSIS — F33.8 SEASONAL DEPRESSION (HCC): ICD-10-CM

## 2019-10-17 DIAGNOSIS — L65.9 HAIR LOSS: ICD-10-CM

## 2019-10-17 DIAGNOSIS — D32.9 MENINGIOMA (HCC): ICD-10-CM

## 2019-10-17 PROCEDURE — 99214 OFFICE O/P EST MOD 30 MIN: CPT | Mod: 25 | Performed by: FAMILY MEDICINE

## 2019-10-17 PROCEDURE — 93000 ELECTROCARDIOGRAM COMPLETE: CPT | Performed by: FAMILY MEDICINE

## 2019-10-17 NOTE — PROGRESS NOTES
"  SUBJECTIVE:    Chief Complaint   Patient presents with   • Heart Problem     x 3 weeks ago, feels like heart is racing and fluttering    • Weakness   • Hand Numbness     worse over the last 3 days, right hand        HPI:     Nubia Arce is a 38 y.o. Female with psoriatic arthritis and prior history of neurological symptoms here for symptoms of intermittent heart fluttering for past 3 weeks. No correlation with anything specific that she could tell.  No chest pains, shortness of breath or dizziness.    Her current \"bigger concern\" is \"all over weakness.\"  She feels she cannot control her right hand properly. Feels uncoordinated. Feels same with her right leg. Started in right leg. Felt hips were weak. Felt she had to support herself.  States that she felt she dropped her fingernail clippers when trying to use them.  Was with a friend who is neuro nurse who evaluated her strength and did not notice any significant findings.   \"Overall weird feeling\" comes in waves.  May occasionally have some dizziness.  Feels some weakness, like her \"muscles are worn out.\"  Overwhelming - life matters.    with neck injury, had spinal epidural.   usually takes care of them.  She was previously talked with the therapist and walked working on childhood issues but was referred to another provider to work on the things associated with her health issues.    Was on vacation last week.  Thought being on vacation would help, but felt her symptoms most days while on vacation.   Feels that it is sometimes hard to form words, but feels front of tongue is heavy.   Has a lot of stress. No new supplements she is taking.   Has 2 bottles of zoloft 25 mg, which she thought might be related thus tried the other bottle.  She is currently on Zoloft 25 mg daily for seasonal depression and anxiety.  Has had some hair loss and brain fog.   Tingling on and off right leg/arm. No significant neck pain.   She did have some recent " blood work completed a couple weeks ago with an extensive panel from another provider which has not resulted yet.  She will notify us once the results come in.    She has had evaluation with neurology in the past. Has had somewhat similar symptoms in the past.  She saw Dr. Rita carrillo is no longer in town. She has been evaluated by [facility] and appears MS was ruled out at that time.   She has had a abnormal MRI in the past with a cerebellar meningioma noted.  Does not feel that she is having any balance issues currently and was told that might be a symptom associated with that.  She did have a prior spinal tap when her symptoms initially occurred.  Noted the CSF protein was high during that time.  States she saw Dr. Granda-had EMG/NCS, same areas as her current symptoms were affected but states it was slightly different.  She had some right lower extremity loss of sensation and tingling at that time but not in her arm.  She has had 2 brain MRIs in 2017 and 2019, notes that she opted out of the contrast during that time.  Due to having the MTHFR gene mutation she was concerned about decreased clearance of the contrast in her system.  She has also had cervical spine, thoracic and lumbar MRIs in the past between 2017 and 2018.    She has a trip planned to [place] on November 2 for her  and herself.  Things seem overwhelming at this point.  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  She feels that she may have some grief regarding the changes in her life with her health and what she would like to do as well as what is expected.  She does note feeling fatigued.      ROS:  Denies any recent fevers or chills. No nausea or vomiting. No diarrhea. No chest pains or shortness of breath. No lower extremity edema.    Current Outpatient Medications on File Prior to Visit   Medication Sig Dispense Refill   • sertraline (ZOLOFT) 25 MG  tablet TAKE 1 TABLET BY MOUTH EVERY DAY 90 Tab 1   • IRON PO Take  by mouth.     • Ascorbic Acid (VITAMIN C) 1000 MG Tab Take  by mouth.     • 5-Methyltetrahydrofolate (METHYL FOLATE) Powder 7.5 mg by Does not apply route every day.     • Turmeric 450 MG Cap Take  by mouth.     • Omega-3 Fatty Acids (FISH OIL) 1000 MG Cap capsule Take 1,000 mg by mouth 3 times a day, with meals.     • cyanocobalamin (VITAMIN B-12) 500 MCG Tab Take 500 mcg by mouth every day.     • vitamin D (CHOLECALCIFEROL) 1000 UNIT Tab Take 6,000 Units by mouth every day.     • acyclovir (ZOVIRAX) 400 MG tablet Take 1 Tab by mouth 3 times a day. Take at first sign of symptoms for 5 days. (Patient not taking: Reported on 8/22/2019) 15 Tab 2   • naproxen (NAPROSYN) 500 MG Tab TAKE 1 TAB BY MOUTH 2 TIMES DAILY WITH MEALS AS NEEDED. (Patient not taking: Reported on 8/22/2019) 30 Tab 0   • zinc sulfate 1 MG/ML Solution by Intravenous route.       No current facility-administered medications on file prior to visit.        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   • Dizziness 12/29/2017   • Seasonal depression ([facility]) 12/29/2017   • Numbness and tingling of left side of face 09/05/2017   • Decreased sensation of lower extremity 06/30/2017   • Gastroesophageal reflux disease 06/30/2017   • Neck pain 03/13/2017   • Psoriatic arthritis ([facility]) 02/09/2017   • Left upper quadrant pain 02/09/2017       Past Medical History:   Diagnosis Date   • Adenomyosis     aug 2018   • Allergy    • Anxiety    •  "Depression    • Generalized hypermobility of joints    • Genital HSV    • HLA B27 (HLA B27 positive)    • Meningioma (HCC)     Jan 2017   • MTHFR gene mutation (HCC)    • 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   • Psoriatic arthritis (HCC)        OBJECTIVE:   /82 (BP Location: Left arm, Patient Position: Sitting, BP Cuff Size: Adult)   Pulse 95   Temp 37.7 °C (99.8 °F) (Temporal)   Resp 14   Ht 1.626 m (5' 4\")   Wt 66.7 kg (147 lb)   SpO2 100%   BMI 25.23 kg/m²   General: Well-developed well-nourished female, no acute distress  HEENT: oropharynx clear, eyes clear, TMs clear and intact, PERRL, EOMI  Neck: supple, no lymphadenopathy- cervical or supraclavicular, no thyromegaly  Cardiovascular: regular rate and rhythm, no murmurs, gallops, rubs  Lungs: clear to auscultation bilaterally, no wheezes, crackles, or rhonchi  Abdomen: +bowel sounds, soft, nontender, nondistended, no rebound, no guarding, no hepatosplenomegaly  Extremities: no cyanosis, clubbing, edema  Skin: Warm and dry  Psych: tearful at times during visit.   Neuro: 2+ DTR throughout, except slightly diminished of Achilles bilaterally. 5/5 strength throughout, appears to have good  strength.  Good strength with abduction and abduction of fingers bilaterally.  CN 2-12 intact bilaterally.  Sensation intact to light touch throughout her extremities, slightly diminished right lateral UE.     EKG: HR 87, sinus      ASSESSMENT/PLAN:    38 y.o.female psoriatic arthritis and prior history of neurological symptoms.     1. Palpitations- normal ekg, currently stable. Consider due to anxiety.   -Await labs. Consider holter monitor as needed.  Will monitor at this time. Otherwise as below.  EKG   2. Weakness- unclear etiology.     CREATINE KINASE    WESTERGREN SED RATE    CRP QUANTITIVE (NON-CARDIAC)   3. Meningioma (HCC)- cerebellar     4. Coordination problem     5. Tingling in extremities     6. Hair loss     7. Psoriatic arthritis (HCC)     8. Stress     9. " Seasonal depression (HCC)     10. Anxiety     -Await lab results recently drawn by outside provider, patient to send over results.   Discussed possibility of neurological as well as psychological causes of her above symptoms.  She will increase Zoloft to 50 mg daily as tolerated.  Counseled on current management of stressors and regular counseling therapy.  Discussed obtaining repeat EMG/NCS.  Discussed obtaining repeat MRI of brain.  Patient to discuss with neurology regarding recommendations for brain MRI with and without contrast.  Patient had declined contrast on prior MRI imaging. Discussed options to see another neurologist in the area as her neurologist is no longer in the area.   Recommend that patient contact her [facility] neurologist also to further discuss/evaluate. Patient is agreeable with this plan.     Return in about 2 weeks (around 10/31/2019).    This medical record contains text that has been entered with the assistance of computer voice recognition and dictation software.  Therefore, it may contain unintended errors in text, spelling, punctuation, or grammar.        > 40 minutes face to face time spent with this patient of which > 30  minutes spent on counseling and coordination of care as above, excluding any time for procedures.

## 2019-10-22 ENCOUNTER — HOSPITAL ENCOUNTER (OUTPATIENT)
Dept: LAB | Facility: MEDICAL CENTER | Age: 39
End: 2019-10-22
Attending: FAMILY MEDICINE
Payer: COMMERCIAL

## 2019-10-22 DIAGNOSIS — R53.1 WEAKNESS: ICD-10-CM

## 2019-10-22 LAB
CK SERPL-CCNC: 32 U/L (ref 0–154)
CRP SERPL HS-MCNC: 0.08 MG/DL (ref 0–0.75)
ERYTHROCYTE [SEDIMENTATION RATE] IN BLOOD BY WESTERGREN METHOD: 4 MM/HOUR (ref 0–20)

## 2019-10-22 PROCEDURE — 85652 RBC SED RATE AUTOMATED: CPT

## 2019-10-22 PROCEDURE — 36415 COLL VENOUS BLD VENIPUNCTURE: CPT

## 2019-10-22 PROCEDURE — 86140 C-REACTIVE PROTEIN: CPT

## 2019-10-22 PROCEDURE — 82550 ASSAY OF CK (CPK): CPT

## 2019-10-23 ENCOUNTER — OFFICE VISIT (OUTPATIENT)
Dept: DERMATOLOGY | Facility: IMAGING CENTER | Age: 39
End: 2019-10-23
Payer: COMMERCIAL

## 2019-10-23 VITALS — TEMPERATURE: 98.8 F | WEIGHT: 145 LBS | HEIGHT: 64 IN | BODY MASS INDEX: 24.75 KG/M2

## 2019-10-23 DIAGNOSIS — L85.8 KERATOSIS PILARIS: ICD-10-CM

## 2019-10-23 DIAGNOSIS — L40.9 PSORIASIS: ICD-10-CM

## 2019-10-23 PROCEDURE — 99203 OFFICE O/P NEW LOW 30 MIN: CPT | Performed by: DERMATOLOGY

## 2019-10-23 RX ORDER — CLOBETASOL PROPIONATE 0.5 MG/G
AEROSOL, FOAM TOPICAL
Qty: 1 CAN | Refills: 3 | Status: SHIPPED | OUTPATIENT
Start: 2019-10-23 | End: 2020-10-22 | Stop reason: SDUPTHER

## 2019-10-23 NOTE — PROGRESS NOTES
DERMATOLOGY NOTE  NEW VISIT     10/23/19  Nubia Arce  1980  MRN: [id number]     Chief complaint: Establish Care       Nubia Arce is a 38 y.o. female who presents for psoriasis of scalp.    HPI: hx psoriasis dx 10 years ago + psoriatic arthritis  Onset: 10 yrs ago  Symptoms: itching and flaking of scalp  Aggravating factors: none  Alleviating factors: clobetasol foam or solution (prefers foam)  Treatments: see above  Has seen a number of rheumatologists over the years but is not seeking treatment for arthritis at this time, prefers to continue with lifestyle modifications as she is going through some neurological work up at this time.    History of skin cancer: No  History of precancers/actinic keratoses: No  History of biopsies:No  History of blistering/severe sunburns:No  Family history of skin cancer:No  Family history of atypical moles:Yes, Details: mom unknown     Tobacco use: Never  Alcohol use:DRINKS: occasionally in social situations  Allergies:Yes, Details: see allergies     Past Medical History:   Diagnosis Date   • Adenomyosis     aug 2018   • Allergy    • Anxiety    • Depression    • Generalized hypermobility of joints    • Genital HSV    • HLA B27 (HLA B27 positive)    • Meningioma (HCC)     Jan 2017   • MTHFR gene mutation ([facility])    • 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   • Psoriatic arthritis ([facility])         Family History   Problem Relation Age of Onset   • Psychiatric Illness Mother         depression   • Hypertension Mother    • Arthritis Father         psoriatic   • Hypertension Father    • Psychiatric Illness Father         depression   • Cancer Maternal Grandmother    • Cancer Maternal Grandfather         stomach   • Other Son         speech apraxia        Social History     Socioeconomic History   • Marital status:      Spouse name: Not on file   • Number of children: Not on file   • Years of education: Not on file   • Highest education level: Not on file   Occupational  History   • Not on file   Social Needs   • Financial resource strain: Not on file   • Food insecurity:     Worry: Not on file     Inability: Not on file   • Transportation needs:     Medical: Not on file     Non-medical: Not on file   Tobacco Use   • Smoking status: Never Smoker   • Smokeless tobacco: Never Used   Substance and Sexual Activity   • Alcohol use: Yes     Alcohol/week: 0.0 - 3.6 oz     Comment: just on the weekends   • Drug use: No     Comment: cbd oil prn   • Sexual activity: Yes     Partners: Male     Birth control/protection: Condom   Lifestyle   • Physical activity:     Days per week: Not on file     Minutes per session: Not on file   • Stress: Not on file   Relationships   • Social connections:     Talks on phone: Not on file     Gets together: Not on file     Attends Gnosticism service: Not on file     Active member of club or organization: Not on file     Attends meetings of clubs or organizations: Not on file     Relationship status: Not on file   • Intimate partner violence:     Fear of current or ex partner: Not on file     Emotionally abused: Not on file     Physically abused: Not on file     Forced sexual activity: Not on file   Other Topics Concern   • Not on file   Social History Narrative   • Not on file        Allergies   Allergen Reactions   • Doxycycline Vomiting     2016         MEDICATIONS:  Medications relevant to specialty reviewed.    Current Outpatient Medications:   •  clobetasol (OLUX) 0.05 % foam, Apply once daily to affected area of scalp as needed until scalp is smooth, Disp: 1 Can, Rfl: 3  •  acyclovir (ZOVIRAX) 400 MG tablet, Take 1 Tab by mouth 3 times a day. Take at first sign of symptoms for 5 days. (Patient not taking: Reported on 8/22/2019), Disp: 15 Tab, Rfl: 2  •  sertraline (ZOLOFT) 25 MG tablet, TAKE 1 TABLET BY MOUTH EVERY DAY, Disp: 90 Tab, Rfl: 1  •  naproxen (NAPROSYN) 500 MG Tab, TAKE 1 TAB BY MOUTH 2 TIMES DAILY WITH MEALS AS NEEDED. (Patient not taking:  "Reported on 8/22/2019), Disp: 30 Tab, Rfl: 0  •  IRON PO, Take  by mouth., Disp: , Rfl:   •  Ascorbic Acid (VITAMIN C) 1000 MG Tab, Take  by mouth., Disp: , Rfl:   •  5-Methyltetrahydrofolate (METHYL FOLATE) Powder, 7.5 mg by Does not apply route every day., Disp: , Rfl:   •  Turmeric 450 MG Cap, Take  by mouth., Disp: , Rfl:   •  Omega-3 Fatty Acids (FISH OIL) 1000 MG Cap capsule, Take 1,000 mg by mouth 3 times a day, with meals., Disp: , Rfl:   •  zinc sulfate 1 MG/ML Solution, by Intravenous route., Disp: , Rfl:   •  cyanocobalamin (VITAMIN B-12) 500 MCG Tab, Take 500 mcg by mouth every day., Disp: , Rfl:   •  vitamin D (CHOLECALCIFEROL) 1000 UNIT Tab, Take 6,000 Units by mouth every day., Disp: , Rfl:      REVIEW OF SYSTEMS:   Positive for skin (see HPI) + joint pains  Negative for fevers and chills  All other systems reviewed and are negative.     EXAM:  Temp 37.1 °C (98.8 °F)   Ht 1.626 m (5' 4\")   Wt 65.8 kg (145 lb)   BMI 24.89 kg/m²   Constitutional: Well-developed, well-nourished, and in no distress.   HENT:   Head: normocephalic and atraumatic.  Right Ear: External ear normal.   Left Ear: External ear normal.   Nose: Nose normal.   Mouth/Throat: Oropharynx is clear and moist.   Eyes: Conjunctivae and lids are normal.   Neck: Normal range of motion. Neck supple.   Pulmonary/Chest: Effort normal.   Neurological: Alert and oriented.    A full mucocutaneous exam was completed including: scalp, hair, ears, face, eyelids, conjunctiva, lips, neck, chest, upper chest, breasts, abdomen, back , lower back, upper back, left and right upper extremities (including hands/digits and fingernails), left and right lower extremities (including feet/toes, but toenails covered in polish) and buttocks with the following pertinent findings listed below. Remaining above-listed examined areas within normal limits / negative for rashes or lesions.  - left temporal and occipital scalp with well demarcated erythematous plaques " with micaceous scale  - extensor surfaces of the arms and legs, buttocks and abdomen with perifollicular, red-brown hyperkeratotic papules      IMPRESSION / PLAN:    1. Psoriasis, BSA <1% + Psoriatic arthritis  -psoriatic arthritis? yes  -educated patient about diagnosis, management options, and expectations of treatment  -discussed the nature of the condition and that there is no cure   -discussed associated co-morbidities, including systemic inflammation (i.e. Joint involvement, cardiovascular disease); smoking cessation discussed  -start clobetasol 0.05% foam to affected area of the scalp daily (pt to call if too expensive and will send rx to [facility] pharmacy)  -Side effects of topical steroids discussed, including systemic absorption, skin thinning, appearance of stretch marks (striae), easy bruising, telangiectasias, and possible increased hair growth   -T/sal (salicylic acid 3%) shampoo 2-3x/week. To leave on scalp for 5-10 minutes prior to rinsing  -discussed option of ILK given localized disease, patient to call if flaring and would like to be added on for ILK  -Has seen a number of rheumatologists over the years but is not seeking treatment for arthritis at this time, prefers to continue with lifestyle modifications as she is going through some neurological work up at this time.    2. Keratosis pilaris  -discussed in detail that this is a common benign skin condition, often considered a chronic inherited skin condition  -treatment options discussed with patient, but patient informed that improvement from treatments is temporary and condition generally returns when treatments are stopped   -start OTC Cerave SA or Amlactin 1-2 times daily   -start good dry skin care (luke warm water, limited fragrance-free gentle soap, apply bland fragrance-free moisturizer within 3 min of exiting the shower)    Return to clinic in: Return in about 6 months (around 4/23/2020). and as needed for any new or changing skin lesions or  sooner for ILK if patient desires.    Inge Carrasco M.D.

## 2019-11-18 DIAGNOSIS — R59.9 GLANDS SWOLLEN: ICD-10-CM

## 2019-11-18 DIAGNOSIS — F41.9 ANXIETY: ICD-10-CM

## 2019-11-18 DIAGNOSIS — R20.2 TINGLING IN EXTREMITIES: ICD-10-CM

## 2019-11-18 DIAGNOSIS — L40.50 PSORIATIC ARTHRITIS (HCC): ICD-10-CM

## 2019-11-18 DIAGNOSIS — R53.83 FATIGUE, UNSPECIFIED TYPE: ICD-10-CM

## 2019-11-18 DIAGNOSIS — L65.9 HAIR LOSS: ICD-10-CM

## 2019-12-03 ENCOUNTER — HOSPITAL ENCOUNTER (OUTPATIENT)
Dept: LAB | Facility: MEDICAL CENTER | Age: 39
End: 2019-12-03
Attending: FAMILY MEDICINE
Payer: COMMERCIAL

## 2019-12-03 DIAGNOSIS — L40.50 PSORIATIC ARTHRITIS (HCC): ICD-10-CM

## 2019-12-03 DIAGNOSIS — R53.83 FATIGUE, UNSPECIFIED TYPE: ICD-10-CM

## 2019-12-03 DIAGNOSIS — R59.9 GLANDS SWOLLEN: ICD-10-CM

## 2019-12-03 DIAGNOSIS — R20.2 TINGLING IN EXTREMITIES: ICD-10-CM

## 2019-12-03 DIAGNOSIS — L65.9 HAIR LOSS: ICD-10-CM

## 2019-12-03 LAB
ERYTHROCYTE [SEDIMENTATION RATE] IN BLOOD BY WESTERGREN METHOD: 5 MM/HOUR (ref 0–20)
RHEUMATOID FACT SER IA-ACNC: <10 IU/ML (ref 0–14)

## 2019-12-03 PROCEDURE — 85652 RBC SED RATE AUTOMATED: CPT

## 2019-12-03 PROCEDURE — 36415 COLL VENOUS BLD VENIPUNCTURE: CPT

## 2019-12-03 PROCEDURE — 86431 RHEUMATOID FACTOR QUANT: CPT

## 2019-12-03 PROCEDURE — 86038 ANTINUCLEAR ANTIBODIES: CPT

## 2019-12-05 LAB — NUCLEAR IGG SER QL IA: NORMAL

## 2020-02-12 ENCOUNTER — HOSPITAL ENCOUNTER (OUTPATIENT)
Facility: MEDICAL CENTER | Age: 40
End: 2020-02-12
Attending: PHYSICIAN ASSISTANT
Payer: COMMERCIAL

## 2020-02-12 ENCOUNTER — OFFICE VISIT (OUTPATIENT)
Dept: URGENT CARE | Facility: CLINIC | Age: 40
End: 2020-02-12
Payer: COMMERCIAL

## 2020-02-12 VITALS
RESPIRATION RATE: 16 BRPM | HEART RATE: 124 BPM | SYSTOLIC BLOOD PRESSURE: 120 MMHG | DIASTOLIC BLOOD PRESSURE: 80 MMHG | TEMPERATURE: 100.5 F | BODY MASS INDEX: 25.1 KG/M2 | HEIGHT: 64 IN | OXYGEN SATURATION: 98 % | WEIGHT: 147 LBS

## 2020-02-12 DIAGNOSIS — R10.2 PELVIC PAIN: ICD-10-CM

## 2020-02-12 DIAGNOSIS — J10.1 INFLUENZA A: ICD-10-CM

## 2020-02-12 LAB
APPEARANCE UR: CLEAR
BILIRUB UR STRIP-MCNC: NORMAL MG/DL
COLOR UR AUTO: YELLOW
FLUAV+FLUBV AG SPEC QL IA: NORMAL
GLUCOSE UR STRIP.AUTO-MCNC: NORMAL MG/DL
INT CON NEG: NORMAL
INT CON NEG: NORMAL
INT CON POS: NORMAL
INT CON POS: NORMAL
KETONES UR STRIP.AUTO-MCNC: NORMAL MG/DL
LEUKOCYTE ESTERASE UR QL STRIP.AUTO: NORMAL
NITRITE UR QL STRIP.AUTO: NORMAL
PH UR STRIP.AUTO: 6 [PH] (ref 5–8)
POC URINE PREGNANCY TEST: NEGATIVE
PROT UR QL STRIP: NORMAL MG/DL
RBC UR QL AUTO: NORMAL
SP GR UR STRIP.AUTO: 1
UROBILINOGEN UR STRIP-MCNC: 0.2 MG/DL

## 2020-02-12 PROCEDURE — 81025 URINE PREGNANCY TEST: CPT | Performed by: PHYSICIAN ASSISTANT

## 2020-02-12 PROCEDURE — 87804 INFLUENZA ASSAY W/OPTIC: CPT | Performed by: PHYSICIAN ASSISTANT

## 2020-02-12 PROCEDURE — 81002 URINALYSIS NONAUTO W/O SCOPE: CPT | Performed by: PHYSICIAN ASSISTANT

## 2020-02-12 PROCEDURE — 99214 OFFICE O/P EST MOD 30 MIN: CPT | Performed by: PHYSICIAN ASSISTANT

## 2020-02-12 PROCEDURE — 87660 TRICHOMONAS VAGIN DIR PROBE: CPT

## 2020-02-12 PROCEDURE — 87480 CANDIDA DNA DIR PROBE: CPT

## 2020-02-12 PROCEDURE — 87510 GARDNER VAG DNA DIR PROBE: CPT

## 2020-02-12 RX ORDER — OSELTAMIVIR PHOSPHATE 75 MG/1
75 CAPSULE ORAL 2 TIMES DAILY
Qty: 10 CAP | Refills: 0 | Status: SHIPPED | OUTPATIENT
Start: 2020-02-12 | End: 2020-02-17

## 2020-02-12 ASSESSMENT — ENCOUNTER SYMPTOMS
EYE REDNESS: 0
FEVER: 1
BACK PAIN: 0
MYALGIAS: 1
CHILLS: 1
FLANK PAIN: 0
SPUTUM PRODUCTION: 0
COUGH: 1
NECK PAIN: 0
WHEEZING: 0
VOMITING: 0
EYE PAIN: 0
SHORTNESS OF BREATH: 1
CONSTIPATION: 0
SORE THROAT: 1
NAUSEA: 0
BRUISES/BLEEDS EASILY: 0
DIZZINESS: 0
SINUS PAIN: 0
HEADACHES: 1
ABDOMINAL PAIN: 1
EYE DISCHARGE: 0
DIARRHEA: 0

## 2020-02-12 NOTE — PROGRESS NOTES
Subjective:      Nubia Arce is a 39 y.o. female who presents with Cough (x 3 days, little cough, chest tightness, shortness of breath and fever) and Tampon Removal (x 5 days, tampon may be stuck)            HPI  1. Flu like symptoms   39-year-old female presents to urgent care with new problem of flulike symptoms onset 2 nights ago.  Patient reports cough, fever, sinus pressure and body aches.  She reports mild associated shortness of breath and chest tightness secondary to cough.  She has been taking Advil with good symptomatic relief.  She did not get influenza vaccination this season.     2. Vaginal discharge   Patient also reports vaginal discharge and left-sided pelvic pain onset few days ago.  Patient reports she recently ended menstrual cycle about 4 days ago.  She reports she may have forgot to remove the tampon.  Denies nausea or vomiting.  Patient denies pregnancy.  She denies exposure to STDs.  She is in a monogamous relationship with her .     Review of Systems   Constitutional: Positive for chills, fever and malaise/fatigue.   HENT: Positive for congestion, ear pain and sore throat. Negative for sinus pain.    Eyes: Negative for pain, discharge and redness.   Respiratory: Positive for cough and shortness of breath. Negative for sputum production and wheezing.    Cardiovascular: Negative for chest pain.   Gastrointestinal: Positive for abdominal pain. Negative for constipation, diarrhea, nausea and vomiting.   Genitourinary: Negative for dysuria, flank pain, frequency, hematuria and urgency.        Vaginal discharge and mild discomfort   Musculoskeletal: Positive for myalgias. Negative for back pain and neck pain.   Skin: Negative for rash.   Neurological: Positive for headaches. Negative for dizziness.   Endo/Heme/Allergies: Does not bruise/bleed easily.       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     • Ascorbic Acid (VITAMIN C) 1000 MG Tab Take  by mouth.     • Turmeric 450 MG Cap Take  by mouth.     • Omega-3 Fatty Acids (FISH OIL) 1000 MG Cap capsule Take 1,000 mg by mouth 3 times a day, with meals.     • zinc sulfate 1 MG/ML Solution by Intravenous route.     • cyanocobalamin (VITAMIN B-12) 500 MCG Tab Take 500 mcg by mouth every day.     • acyclovir (ZOVIRAX) 400 MG tablet Take 1 Tab by mouth 3 times a day. Take at first sign of symptoms for 5 days. (Patient not taking: Reported on 8/22/2019) 15 Tab 2   • naproxen (NAPROSYN) 500 MG Tab TAKE 1 TAB BY MOUTH 2 TIMES DAILY WITH MEALS AS NEEDED. (Patient not taking: Reported on 8/22/2019) 30 Tab 0   • 5-Methyltetrahydrofolate (METHYL FOLATE) Powder 7.5 mg by Does not apply route every day.     • vitamin D (CHOLECALCIFEROL) 1000 UNIT Tab Take 6,000 Units by mouth every day.       No current facility-administered medications on file prior to visit.      Allergies   Allergen Reactions   • Doxycycline Vomiting     2016      Social History     Tobacco Use   • Smoking status: Never Smoker   • Smokeless tobacco: Never Used   Substance Use Topics   • Alcohol use: Yes     Alcohol/week: 0.0 - 3.6 oz     Comment: just on the weekends      Objective:     /80 (BP Location: Left arm, Patient Position: Sitting, BP Cuff Size: Adult)   Pulse (!) 124   Temp (!) 38.1 °C (100.5 °F) (Temporal)   Resp 16   Ht 1.626 m (5' 4\")   Wt 66.7 kg (147 lb)   SpO2 98%   BMI " 25.23 kg/m²      Physical Exam  Vitals signs reviewed.   Constitutional:       General: She is not in acute distress.     Appearance: Normal appearance. She is well-developed. She is not ill-appearing.   HENT:      Head: Normocephalic and atraumatic.      Right Ear: Tympanic membrane and ear canal normal.      Left Ear: Tympanic membrane and ear canal normal.      Nose: Congestion and rhinorrhea present.      Mouth/Throat:      Mouth: Mucous membranes are moist.      Pharynx: Oropharynx is clear.   Eyes:      Extraocular Movements: Extraocular movements intact.      Conjunctiva/sclera: Conjunctivae normal.   Neck:      Musculoskeletal: Normal range of motion and neck supple.   Cardiovascular:      Rate and Rhythm: Regular rhythm. Tachycardia present.      Heart sounds: Normal heart sounds.   Pulmonary:      Effort: Pulmonary effort is normal. No respiratory distress.      Breath sounds: Normal breath sounds.   Abdominal:      General: There is no distension.      Palpations: Abdomen is soft.      Tenderness: There is abdominal tenderness in the suprapubic area and left lower quadrant. There is no right CVA tenderness, left CVA tenderness, guarding or rebound.   Genitourinary:     Exam position: Prone.      Vagina: No foreign body. Vaginal discharge present. No bleeding.      Cervix: Normal.      Comments: Minimal brownish vaginal discharge present.  No odor present.  No cervical motion tenderness.    Musculoskeletal: Normal range of motion.   Skin:     General: Skin is warm and dry.      Findings: No erythema.   Neurological:      General: No focal deficit present.      Mental Status: She is alert and oriented to person, place, and time.   Psychiatric:         Mood and Affect: Mood normal.         Behavior: Behavior normal.         Thought Content: Thought content normal.         Judgment: Judgment normal.                 Assessment/Plan:     1. Influenza A  POCT Influenza A/B    oseltamivir (TAMIFLU) 75 MG Cap   2.  Pelvic pain  POCT Urinalysis    POCT Pregnancy    US-PELVIC COMPLETE (TRANSABDOMINAL/TRANSVAGINAL) (COMBO)    VAGINAL PATHOGENS DNA PANEL    CANCELED: US-PELVIC TRANSVAGINAL ONLY     Results for orders placed or performed in visit on 02/12/20   POCT Influenza A/B   Result Value Ref Range    Rapid Influenza A-B Positive flu A     Internal Control Positive Valid     Internal Control Negative Valid    POCT Urinalysis   Result Value Ref Range    POC Color Yellow Negative    POC Appearance clear Negative    POC Leukocyte Esterase neg Negative    POC Nitrites neg Negative    POC Urobiligen 0.2 Negative (0.2) mg/dL    POC Protein neg Negative mg/dL    POC Urine PH 6.0 5.0 - 8.0    POC Blood neg Negative    POC Specific Gravity 1.005 <1.005 - >1.030    POC Ketones neg Negative mg/dL    POC Bilirubin neg Negative mg/dL    POC Glucose neg Negative mg/dL   POCT Pregnancy   Result Value Ref Range    POC Urine Pregnancy Test Negative Negative    Internal Control Positive Valid     Internal Control Negative Valid        1. Advised patient symptoms are viral in etiology, recommend supportive care. Increased fluids and rest.  Recommend over-the-counter cold and flu medications and Tylenol and/or ibuprofen for symptomatic relief and fevers.  Discussed use of nedi-pot, humidifier, and Flonase nasal spray as well.  Discussed good hand hygiene and ways to decrease spread of disease.  Follow-up with PCP return for reevaluation if symptoms persist/worsen.  Patient offered discharge instructions regarding flu.  The patient demonstrated a good understanding and agreed with the treatment plan.     2.  Transabdominal/trans-vaginal ultrasound ordered.  Patient given scheduling for 215, however she states she is unable to make this appointment.  Patient advised to speak with scheduling to obtain an appointment that works with her schedule.  Patient advised to proceed to emergency department for any worsening of symptoms.  We will follow-up  pending lab results.    Patient verbalized understanding of treatment plan and has no further questions regarding care.

## 2020-02-13 LAB
CANDIDA DNA VAG QL PROBE+SIG AMP: NEGATIVE
G VAGINALIS DNA VAG QL PROBE+SIG AMP: NEGATIVE
T VAGINALIS DNA VAG QL PROBE+SIG AMP: NEGATIVE

## 2020-02-27 ENCOUNTER — OFFICE VISIT (OUTPATIENT)
Dept: MEDICAL GROUP | Facility: IMAGING CENTER | Age: 40
End: 2020-02-27
Payer: COMMERCIAL

## 2020-02-27 VITALS
BODY MASS INDEX: 24.41 KG/M2 | RESPIRATION RATE: 13 BRPM | SYSTOLIC BLOOD PRESSURE: 124 MMHG | TEMPERATURE: 98.6 F | WEIGHT: 143 LBS | DIASTOLIC BLOOD PRESSURE: 76 MMHG | OXYGEN SATURATION: 99 % | HEIGHT: 64 IN | HEART RATE: 81 BPM

## 2020-02-27 DIAGNOSIS — Z12.39 SCREENING FOR BREAST CANCER: ICD-10-CM

## 2020-02-27 DIAGNOSIS — F41.9 ANXIETY: ICD-10-CM

## 2020-02-27 DIAGNOSIS — G44.201 ACUTE INTRACTABLE TENSION-TYPE HEADACHE: ICD-10-CM

## 2020-02-27 DIAGNOSIS — A04.8 HELICOBACTER PYLORI INFECTION: ICD-10-CM

## 2020-02-27 DIAGNOSIS — F33.8 SEASONAL DEPRESSION (HCC): ICD-10-CM

## 2020-02-27 DIAGNOSIS — M24.9 HYPERMOBILITY OF JOINT: ICD-10-CM

## 2020-02-27 DIAGNOSIS — F43.29 STRESS AND ADJUSTMENT REACTION: ICD-10-CM

## 2020-02-27 DIAGNOSIS — L40.50 PSORIATIC ARTHRITIS (HCC): ICD-10-CM

## 2020-02-27 DIAGNOSIS — R10.12 LUQ PAIN: ICD-10-CM

## 2020-02-27 PROCEDURE — 99215 OFFICE O/P EST HI 40 MIN: CPT | Performed by: FAMILY MEDICINE

## 2020-02-27 RX ORDER — NAPROXEN 500 MG/1
500 TABLET ORAL
Qty: 30 TAB | Refills: 0 | Status: SHIPPED | OUTPATIENT
Start: 2020-02-27 | End: 2020-03-15

## 2020-02-27 RX ORDER — SERTRALINE HYDROCHLORIDE 25 MG/1
37.5 TABLET, FILM COATED ORAL DAILY
Qty: 135 TAB | Refills: 1 | Status: SHIPPED | OUTPATIENT
Start: 2020-02-27 | End: 2020-03-15

## 2020-02-27 RX ORDER — SERTRALINE HYDROCHLORIDE 25 MG/1
25 TABLET, FILM COATED ORAL
Qty: 90 TAB | Refills: 1 | Status: CANCELLED | OUTPATIENT
Start: 2020-02-27

## 2020-02-27 ASSESSMENT — PATIENT HEALTH QUESTIONNAIRE - PHQ9
CLINICAL INTERPRETATION OF PHQ2 SCORE: 1
SUM OF ALL RESPONSES TO PHQ QUESTIONS 1-9: 7
5. POOR APPETITE OR OVEREATING: 0 - NOT AT ALL

## 2020-02-27 ASSESSMENT — PAIN SCALES - GENERAL: PAINLEVEL: 4=SLIGHT-MODERATE PAIN

## 2020-02-27 NOTE — PROGRESS NOTES
"  SUBJECTIVE:      Chief Complaint   Patient presents with   • Headache     constant headache for x6 weeks. tense jaw and teeth. pain on crown of head   • Other     left rib pain. when ovulating \"feels like someone is pulling on my heart\" ovulating pain getting worse.   • Medication Refill     sertraline and naproxen       HPI:     Nubia Arce is a 39 y.o. female with hypothyroidism, psoriatic arthritis, seasonal depression, anxiety, pineal gland cyst, meningioma, MTHFR gene mutation and reflux here for symptoms of headaches and LUQ pain.     Headache- at crown of head. Feels has tension in jaw and teeth. Symptoms for past 6 weeks. Feels she is clenching a lot during day and night. Sometimes notices throat issues. Does not like to take a lot of medication.   Denies any numbness, tingling or weakness. No visual changes or dizziness.   Felt acupuncture has helped in her past.   Anxiety might exacerbate symptoms.   Feels she has overall good support. Friends are maybe not as close.  No nausea or vomiting.      Psoriatic arthritis- Rarely takes naproxen for joint pain.   Takes once every few weeks or so.   Has had hypermobility diagnosed by rheumatology.   Searching for a rheumatologist, had prior referral, needed further lab work completed prior to scheduling. States she got frustrated thus has not completed.     LUQ pain- possibly related to rib pain and psoriatic arthritis. Has had in past. Feels more so with ovulation. Feels like something is \"pulling\" up to her chest area during ovulation. No chest pressure or shortness of breath.  Hx of adenomysosis.   Has been seeing [facility]- Trevon Puente. Had multiple test completed.   Noted has h pylori- was recommended a supplement protocol. Wondering about recommendations. No significant abdominal pain.   Gut bacteria looked good.   Vitamin D was high, thus stopped supplement.   Discussed iron being off,  dessicated liver capsules. " "  Glucose on higher end noted.     Seasonal depression and anxiety- she has been on Zoloft 25 mg, 1.5 tab daily.   Discussed with psychiatrist, seasonally weaning her medication therapy.   Has puppy.   Has done pilates and hot yoga. Has pushed herself a little.   Medical issues are triggers- went there for her tumor.   Working with different therapist. Chronic pain.   Recommended psychiatrist and therapist.   Some ocd symptoms possibly. Different methods of treatment.   Was recommended to see her PCP monthly to check and consider if she is being over reactive of some of her symptoms.   Has not talked about possible ptsd with her therapist.   She has gone through a lot in the past several years and finding out about the brain MRI findings.    10 years ago with miscarriage- feeling out of control. 2, but one was twins. Fear of what is wrong with her.   Had son- was on low dose asa and progesterone therapy.   A lot of testing, 2 year last psoriatic arthritis.   2 years ago- concerns for MS. Was challenging. ER visit- blood patch, felt she was in a \"chop shop,\" at the time. Had male nurse and anesthesiologist. Felt she got poked a lot and there was lack of compassion.   Last labs 8/2019.     ROS:  Denies any recent fevers or chills. No nausea or vomiting. No diarrhea. No chest pains or shortness of breath. No lower extremity edema.    Current Outpatient Medications on File Prior to Visit   Medication Sig Dispense Refill   • clobetasol (OLUX) 0.05 % foam Apply once daily to affected area of scalp as needed until scalp is smooth 1 Can 3   • IRON PO Take  by mouth.     • Ascorbic Acid (VITAMIN C) 1000 MG Tab Take  by mouth.     • Turmeric 450 MG Cap Take  by mouth.     • Omega-3 Fatty Acids (FISH OIL) 1000 MG Cap capsule Take 1,000 mg by mouth 3 times a day, with meals.     • cyanocobalamin (VITAMIN B-12) 500 MCG Tab Take 500 mcg by mouth every day.     • acyclovir (ZOVIRAX) 400 MG tablet Take 1 Tab by mouth 3 times a day. " "Take at first sign of symptoms for 5 days. (Patient not taking: Reported on 8/22/2019) 15 Tab 2   • 5-Methyltetrahydrofolate (METHYL FOLATE) Powder 7.5 mg by Does not apply route every day.     • zinc sulfate 1 MG/ML Solution by Intravenous route.     • vitamin D (CHOLECALCIFEROL) 1000 UNIT Tab Take 6,000 Units by mouth every day.       No current facility-administered medications on file prior to visit.        Allergies   Allergen Reactions   • Doxycycline Vomiting     2016        Patient Active Problem List    Diagnosis Date Noted   • Dense breast tissue 07/12/2019   • MTHFR gene mutation ([facility])    • Breast tenderness in female 01/17/2019   • Acquired hypothyroidism 11/07/2018   • Iron deficiency anemia due to chronic blood loss 07/27/2018   • Dysesthesia 05/25/2018   • Meningioma ([facility]) 05/25/2018   • Lymphadenopathy of head and neck 02/01/2018   • Abnormal brain MRI 01/23/2018   • Pineal gland cyst 01/23/2018   • Dizziness 12/29/2017   • Seasonal depression ([facility]) 12/29/2017   • Numbness and tingling of left side of face 09/05/2017   • Decreased sensation of lower extremity 06/30/2017   • Gastroesophageal reflux disease 06/30/2017   • Neck pain 03/13/2017   • Psoriatic arthritis ([facility]) 02/09/2017   • Left upper quadrant pain 02/09/2017       Past Medical History:   Diagnosis Date   • Adenomyosis     aug 2018   • Allergy    • Anxiety    • Depression    • Generalized hypermobility of joints    • Genital HSV    • HLA B27 (HLA B27 positive)    • Meningioma ([facility])     Jan 2017   • MTHFR gene mutation ([facility])    • 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 No spinal TTP, full ROM. Head/jaw nontender to palpation.   Neck: supple, no lymphadenopathy- cervical or supraclavicular, no thyromegaly  Cardiovascular: regular rate and rhythm, no murmurs, gallops, rubs  Lungs: clear to auscultation bilaterally, no wheezes, crackles, or rhonchi  Abdomen: +bowel sounds, soft, nontender, nondistended, no rebound, no guarding, no hepatosplenomegaly. Mild TTP of costochondral region.   Extremities: no cyanosis, clubbing, edema  Skin: Warm and dry  Psych: appropriate mood and affect  Neuro: 2+ DTR throughout, 5/5 strength throughout      ASSESSMENT/PLAN:    39 y.o.female with hypothyroidism, psoriatic arthritis, seasonal depression, anxiety, pineal gland cyst, meningioma, MTHFR gene mutation and reflux.      1. Acute intractable tension-type headache- tension in jaw region as well. Prefers to limit medication therapy.   -Discussed options of therapy. May use naproxen as needed. Consider acupuncture therapy. Recommend physical therapy. Consider chiropractic therapy. Modify stressors. Will monitor at this time. Consider further imaging if continued or worsening symptoms.  REFERRAL TO PHYSICAL THERAPY   2. Psoriatic arthritis (HCC)  -Naproxen as needed. Referred to rheumatology.   REFERRAL TO RHEUMATOLOGY   3. Hypermobility of joint -consider further evaluation for possible arslan danlos.   -Refer to rheumatology for further evaluation and discussion.  REFERRAL TO RHEUMATOLOGY AND PHYSICAL THERAPY   4. Seasonal depression (HCC) - seeing psychiatry and therapist. Weaning medication with seasonal changes. Otherwise stable.  sertraline (ZOLOFT) 25 MG tablet   5. Anxiety - continue current medication. Counseled on management of symptoms. Continue follow up with psychiatry and counseling therapy.     6. Stress and adjustment reaction- consider component due to ptsd or other stress syndrome due to patient's past experiences. Recommend discuss further with  therapist.      7. Helicobacter pylori infection - per functional medicine provider.   -Discussed options of therapy for current recommendations per functional medicine provider and conventional triple therapy. Patient will plan to proceed with therapy per functional medicine provider at this time. Monitor.     8. LUQ pain-appears component due to MSK etiology. Unclear association with ovulation at this time.   -Consider working with chiropractic therapy or physical therapy. Will continue to monitor.     9. Screening for breast cancer  [id number]   Recommend use of health journal. Recommend journal and monitor symptoms.     Return in about 3 weeks (around 3/19/2020).    This medical record contains text that has been entered with the assistance of computer voice recognition and dictation software.  Therefore, it may contain unintended errors in text, spelling, punctuation, or grammar.    > 40 minutes face to face time spent with this patient of which > 30  minutes spent on counseling and coordination of care as above, excluding any time for procedures.

## 2020-03-15 RX ORDER — SERTRALINE HYDROCHLORIDE 25 MG/1
25 TABLET, FILM COATED ORAL
Qty: 90 TAB | Refills: 1 | Status: SHIPPED | OUTPATIENT
Start: 2020-03-15 | End: 2020-08-31

## 2020-03-15 RX ORDER — NAPROXEN 500 MG/1
500 TABLET ORAL
Qty: 30 TAB | Refills: 0 | Status: SHIPPED | OUTPATIENT
Start: 2020-03-15 | End: 2022-02-02

## 2020-03-19 ENCOUNTER — OFFICE VISIT (OUTPATIENT)
Dept: MEDICAL GROUP | Facility: IMAGING CENTER | Age: 40
End: 2020-03-19
Payer: COMMERCIAL

## 2020-03-19 DIAGNOSIS — L40.50 PSORIATIC ARTHRITIS (HCC): ICD-10-CM

## 2020-03-19 DIAGNOSIS — A04.8 H. PYLORI INFECTION: ICD-10-CM

## 2020-03-19 DIAGNOSIS — R10.12 CHRONIC LUQ PAIN: ICD-10-CM

## 2020-03-19 DIAGNOSIS — F41.9 ANXIETY: ICD-10-CM

## 2020-03-19 DIAGNOSIS — G44.209 TENSION-TYPE HEADACHE, NOT INTRACTABLE, UNSPECIFIED CHRONICITY PATTERN: ICD-10-CM

## 2020-03-19 DIAGNOSIS — G89.29 CHRONIC LUQ PAIN: ICD-10-CM

## 2020-03-19 PROCEDURE — 99443 PR PHYSICIAN TELEPHONE EVALUATION 21-30 MIN: CPT | Mod: 95,CR | Performed by: FAMILY MEDICINE

## 2020-03-19 NOTE — PROGRESS NOTES
Telephone Appointment Visit   As a means of avoiding spread of COVID-19, this visit is being conducted by telephone. This telephone visit was initiated by the patient and they verbally consented.    Time at start of call: 10:30 am    Reason for Call:  Symptom Follow-up- anxiety and chronic LUQ pain    Patient Comments / History:     Nubia Arce is a 39 y.o. female with hypothyroidism, psoriatic arthritis, seasonal depression, anxiety, pineal gland cyst, meningioma, MTHFR gene mutation and reflux here for anxiety and chronic LUQ pain symptoms.     Anxiety-  with having her medical issues. More so with past few days with current community concerns for coronavirus as well.   Fear of not having access to medical care. She is on zoloft 25 mg daily.   Does see counseling therapy, recommended to follow up with PCP more regularly.     LUQ pain- has been a chronic issue.   Feels correlates with her cycle. Feels on whole left side, like ribs, tugging at muscle underneath her heart.   Has had imaging done in past. Soft tissue below rib TTP for past couple years,  more towards left.  bearing down aggravates tugging sensation.   Has gynecologist- hx of adenomyosis in past 2 years. No clear history of endometriosis in past.   Last month had some in between period spotting.    Wondering about psychosomatic d/o.   Overall her symptoms have been within the past 2-3 years.   Endoscopy last year. Normal.   2/2018 MRI T 7-8 small disc protrusion.  has chiropractor.   Was not able to get in for acupuncture.   Did yoga this morning. Any stretching bending, needs to be careful.   Psoriatic arthritis- waw Dr. Blade Burden in past, would like to see a new provider.     Headache- slightly better after she increased her iron.   Brain MRI in 2018.     H Pylori- per functional medicine provider, but has not started protocol recommended.   Feels has GI issues with nsaids.     She will schedule mammogram.     ROS:  Denies any  recent fevers or chills. No nausea or vomiting. No diarrhea. No chest pains or shortness of breath. No lower extremity edema.  Current Outpatient Medications on File Prior to Visit   Medication Sig Dispense Refill   • sertraline (ZOLOFT) 25 MG tablet Take 1 Tab by mouth every day. 90 Tab 1   • naproxen (NAPROSYN) 500 MG Tab Take 1 Tab by mouth 2 times daily with meals as needed. 30 Tab 0   • clobetasol (OLUX) 0.05 % foam Apply once daily to affected area of scalp as needed until scalp is smooth 1 Can 3   • acyclovir (ZOVIRAX) 400 MG tablet Take 1 Tab by mouth 3 times a day. Take at first sign of symptoms for 5 days. (Patient not taking: Reported on 8/22/2019) 15 Tab 2   • IRON PO Take  by mouth.     • Ascorbic Acid (VITAMIN C) 1000 MG Tab Take  by mouth.     • 5-Methyltetrahydrofolate (METHYL FOLATE) Powder 7.5 mg by Does not apply route every day.     • Turmeric 450 MG Cap Take  by mouth.     • Omega-3 Fatty Acids (FISH OIL) 1000 MG Cap capsule Take 1,000 mg by mouth 3 times a day, with meals.     • zinc sulfate 1 MG/ML Solution by Intravenous route.     • cyanocobalamin (VITAMIN B-12) 500 MCG Tab Take 500 mcg by mouth every day.     • vitamin D (CHOLECALCIFEROL) 1000 UNIT Tab Take 6,000 Units by mouth every day.       No current facility-administered medications on file prior to visit.        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     COMPARISON: None     FINDINGS:  The liver is normal in contour. There is no evidence of solid mass lesion. The liver measures 14.86 cm.     The gallbladder is normal. There is no evidence of cholelithiasis. The gallbladder wall thickness measures 0.02 cm  There is no pericholecystic fluid.     The common duct measures 0.29 cm.     The visualized pancreas is unremarkable.  The visualized aorta is normal in caliber.     Intrahepatic IVC is patent.     The portal vein is patent with hepatopetal flow.     The right kidney measures 10.08 cm.  The left kidney measures 9.61 cm.  There is no hydronephrosis.     The spleen measures 8.20 cm maximally.     The bladder is decompressed.     There is no ascites. There is suggestion of possible thin complex fluid collection between the spleen and diaphragm measuring up to 1 cm in thickness.     IMPRESSION:     1.  Possible complex fluid collection or inflammation between spleen and diaphragm is noted.     2.  No other abnormalities identified.       Assessment and Plan:     Nubia Arce is a 39 y.o. female with hypothyroidism, psoriatic arthritis, seasonal depression, anxiety, pineal gland cyst, meningioma, MTHFR gene mutation and reflux.    1. Anxiety- recent further stressors.   -Counseled on management of symptoms and patient reassured medical care will be available. Continue zoloft 25 mg daily and routine counseling therapy. Consider NET therapy.     2. Chronic LUQ pain- unclear etiology, component may be related to psoriatic arthritis, thoracic spine component, possible underlying scar tissue, endometriosis or other myofascial/musculoskeletal  component.   -Will obtain abdominal u/s due to prior finding on imaging of possible complex fluid collection. Modify activities, avoid aggravating. Discussed consider chiropractic therapy or physical therapy.  US-ABDOMEN COMPLETE SURVEY   3. Psoriatic arthritis (HCC)   -Awaiting to establish with new  rheumatologist.     4. Tension-type headache, not intractable, unspecified chronicity pattern - some improvements currently.   -Will continue to monitor.     5. H. pylori infection - she has protocol to follow per functional medicine.         Follow-up: Return in about 1 month (around 4/19/2020). Follow up sooner as needed.     Time at end of call: 11:03 am  Total Time Spent: 33 minutes    Yessica Duke M.D.

## 2020-03-20 DIAGNOSIS — R92.30 DENSE BREAST TISSUE: ICD-10-CM

## 2020-03-20 DIAGNOSIS — Z12.39 SCREENING FOR BREAST CANCER: ICD-10-CM

## 2020-03-23 ENCOUNTER — HOSPITAL ENCOUNTER (OUTPATIENT)
Dept: RADIOLOGY | Facility: MEDICAL CENTER | Age: 40
End: 2020-03-23
Attending: FAMILY MEDICINE
Payer: COMMERCIAL

## 2020-03-23 DIAGNOSIS — G89.29 CHRONIC LUQ PAIN: ICD-10-CM

## 2020-03-23 DIAGNOSIS — R10.12 CHRONIC LUQ PAIN: ICD-10-CM

## 2020-03-23 PROCEDURE — 76700 US EXAM ABDOM COMPLETE: CPT

## 2020-03-26 PROBLEM — G44.209 TENSION-TYPE HEADACHE, NOT INTRACTABLE: Status: ACTIVE | Noted: 2020-03-26

## 2020-03-26 PROBLEM — A04.8 H. PYLORI INFECTION: Status: ACTIVE | Noted: 2020-03-26

## 2020-03-26 PROBLEM — G89.29 CHRONIC LUQ PAIN: Status: ACTIVE | Noted: 2017-02-09

## 2020-05-20 ENCOUNTER — HOSPITAL ENCOUNTER (OUTPATIENT)
Dept: RADIOLOGY | Facility: MEDICAL CENTER | Age: 40
End: 2020-05-20
Payer: COMMERCIAL

## 2020-05-28 ENCOUNTER — APPOINTMENT (OUTPATIENT)
Dept: RADIOLOGY | Facility: MEDICAL CENTER | Age: 40
End: 2020-05-28
Attending: FAMILY MEDICINE
Payer: COMMERCIAL

## 2020-06-04 DIAGNOSIS — Z12.39 SCREENING FOR BREAST CANCER: ICD-10-CM

## 2020-06-10 DIAGNOSIS — Z12.39 SCREENING FOR BREAST CANCER: ICD-10-CM

## 2020-06-10 DIAGNOSIS — R92.30 DENSE BREAST TISSUE: ICD-10-CM

## 2020-08-06 ENCOUNTER — HOSPITAL ENCOUNTER (OUTPATIENT)
Dept: LAB | Facility: MEDICAL CENTER | Age: 40
End: 2020-08-06
Attending: INTERNAL MEDICINE
Payer: COMMERCIAL

## 2020-08-06 LAB
25(OH)D3 SERPL-MCNC: 50 NG/ML (ref 30–100)
ALBUMIN SERPL BCP-MCNC: 4.1 G/DL (ref 3.2–4.9)
ALP SERPL-CCNC: 38 U/L (ref 30–99)
ALT SERPL-CCNC: 10 U/L (ref 2–50)
AST SERPL-CCNC: 11 U/L (ref 12–45)
BASOPHILS # BLD AUTO: 0.8 % (ref 0–1.8)
BASOPHILS # BLD: 0.04 K/UL (ref 0–0.12)
BILIRUB CONJ SERPL-MCNC: <0.2 MG/DL (ref 0.1–0.5)
BILIRUB INDIRECT SERPL-MCNC: ABNORMAL MG/DL (ref 0–1)
BILIRUB SERPL-MCNC: 0.3 MG/DL (ref 0.1–1.5)
CREAT SERPL-MCNC: 0.61 MG/DL (ref 0.5–1.4)
CRP SERPL HS-MCNC: 0.03 MG/DL (ref 0–0.75)
EOSINOPHIL # BLD AUTO: 0.06 K/UL (ref 0–0.51)
EOSINOPHIL NFR BLD: 1.2 % (ref 0–6.9)
ERYTHROCYTE [DISTWIDTH] IN BLOOD BY AUTOMATED COUNT: 47.5 FL (ref 35.9–50)
HCT VFR BLD AUTO: 42.9 % (ref 37–47)
HGB BLD-MCNC: 13 G/DL (ref 12–16)
IMM GRANULOCYTES # BLD AUTO: 0.01 K/UL (ref 0–0.11)
IMM GRANULOCYTES NFR BLD AUTO: 0.2 % (ref 0–0.9)
LYMPHOCYTES # BLD AUTO: 1.29 K/UL (ref 1–4.8)
LYMPHOCYTES NFR BLD: 26.5 % (ref 22–41)
MCH RBC QN AUTO: 25.6 PG (ref 27–33)
MCHC RBC AUTO-ENTMCNC: 30.3 G/DL (ref 33.6–35)
MCV RBC AUTO: 84.4 FL (ref 81.4–97.8)
MONOCYTES # BLD AUTO: 0.26 K/UL (ref 0–0.85)
MONOCYTES NFR BLD AUTO: 5.3 % (ref 0–13.4)
NEUTROPHILS # BLD AUTO: 3.21 K/UL (ref 2–7.15)
NEUTROPHILS NFR BLD: 66 % (ref 44–72)
NRBC # BLD AUTO: 0 K/UL
NRBC BLD-RTO: 0 /100 WBC
PLATELET # BLD AUTO: 281 K/UL (ref 164–446)
PMV BLD AUTO: 10.8 FL (ref 9–12.9)
PROT SERPL-MCNC: 6.6 G/DL (ref 6–8.2)
RBC # BLD AUTO: 5.08 M/UL (ref 4.2–5.4)
WBC # BLD AUTO: 4.9 K/UL (ref 4.8–10.8)

## 2020-08-06 PROCEDURE — 82565 ASSAY OF CREATININE: CPT

## 2020-08-06 PROCEDURE — 80076 HEPATIC FUNCTION PANEL: CPT

## 2020-08-06 PROCEDURE — 82306 VITAMIN D 25 HYDROXY: CPT

## 2020-08-06 PROCEDURE — 85652 RBC SED RATE AUTOMATED: CPT

## 2020-08-06 PROCEDURE — 86140 C-REACTIVE PROTEIN: CPT

## 2020-08-06 PROCEDURE — 36415 COLL VENOUS BLD VENIPUNCTURE: CPT

## 2020-08-06 PROCEDURE — 85025 COMPLETE CBC W/AUTO DIFF WBC: CPT

## 2020-08-07 ENCOUNTER — HOSPITAL ENCOUNTER (OUTPATIENT)
Dept: LAB | Facility: MEDICAL CENTER | Age: 40
End: 2020-08-07
Attending: FAMILY MEDICINE
Payer: COMMERCIAL

## 2020-08-07 DIAGNOSIS — Z11.59 SCREENING FOR VIRAL DISEASE: ICD-10-CM

## 2020-08-07 LAB
COVID ORDER STATUS COVID19: NORMAL
ERYTHROCYTE [SEDIMENTATION RATE] IN BLOOD BY WESTERGREN METHOD: 2 MM/HOUR (ref 0–20)

## 2020-08-07 PROCEDURE — U0003 INFECTIOUS AGENT DETECTION BY NUCLEIC ACID (DNA OR RNA); SEVERE ACUTE RESPIRATORY SYNDROME CORONAVIRUS 2 (SARS-COV-2) (CORONAVIRUS DISEASE [COVID-19]), AMPLIFIED PROBE TECHNIQUE, MAKING USE OF HIGH THROUGHPUT TECHNOLOGIES AS DESCRIBED BY CMS-2020-01-R: HCPCS

## 2020-08-07 PROCEDURE — C9803 HOPD COVID-19 SPEC COLLECT: HCPCS

## 2020-08-08 LAB
SARS-COV-2 RNA RESP QL NAA+PROBE: NOTDETECTED
SPECIMEN SOURCE: NORMAL

## 2020-08-10 ENCOUNTER — TELEPHONE (OUTPATIENT)
Dept: MEDICAL GROUP | Facility: IMAGING CENTER | Age: 40
End: 2020-08-10

## 2020-08-10 NOTE — TELEPHONE ENCOUNTER
Pt called for covid test result. Notified her of negative result. Pt thinks her symptoms may have been related to her UTI that she was treated for. The UTI symptoms have resolved, but she does have some itching that she is managing with OTC medication. She will reach out to us with any needs.

## 2020-09-26 ENCOUNTER — IMMUNIZATION (OUTPATIENT)
Dept: SOCIAL WORK | Facility: CLINIC | Age: 40
End: 2020-09-26
Payer: COMMERCIAL

## 2020-09-26 DIAGNOSIS — Z23 NEED FOR VACCINATION: ICD-10-CM

## 2020-09-26 PROCEDURE — 90686 IIV4 VACC NO PRSV 0.5 ML IM: CPT | Performed by: FAMILY MEDICINE

## 2020-09-26 PROCEDURE — 90471 IMMUNIZATION ADMIN: CPT | Performed by: FAMILY MEDICINE

## 2020-10-22 ENCOUNTER — OFFICE VISIT (OUTPATIENT)
Dept: DERMATOLOGY | Facility: IMAGING CENTER | Age: 40
End: 2020-10-22
Payer: COMMERCIAL

## 2020-10-22 DIAGNOSIS — D23.9 DERMATOFIBROMA: ICD-10-CM

## 2020-10-22 DIAGNOSIS — L40.9 PSORIASIS OF SCALP: ICD-10-CM

## 2020-10-22 DIAGNOSIS — L82.1 SEBORRHEIC KERATOSES: ICD-10-CM

## 2020-10-22 DIAGNOSIS — L50.8 CHRONIC URTICARIA: ICD-10-CM

## 2020-10-22 PROCEDURE — 11900 INJECT SKIN LESIONS </W 7: CPT | Performed by: DERMATOLOGY

## 2020-10-22 PROCEDURE — 99214 OFFICE O/P EST MOD 30 MIN: CPT | Mod: 25 | Performed by: DERMATOLOGY

## 2020-10-22 RX ORDER — CLOBETASOL PROPIONATE 0.5 MG/G
AEROSOL, FOAM TOPICAL
Qty: 50 G | Refills: 4 | Status: SHIPPED | OUTPATIENT
Start: 2020-10-22 | End: 2021-12-01 | Stop reason: SDUPTHER

## 2020-10-22 NOTE — PROGRESS NOTES
DERMATOLOGY NOTE  FOLLOW UP VISIT     Chief complaint: Psoriasis     Nubia Gary Arce is a 39 y.o. female who presents for psoriasis of scalp.  Still using clobetasol foam.  States recent flare.  Area of scalp irritation now farther down neck, into hairline. Also persistent patch on left temporal scalp never goes away.    Also gets red blotchy itchy skin on arms after hot baths and sometimes around neck area, quickly goes away, never tried any medicine for it.    Also a few spots of concern.    Initial hx:  HPI: hx psoriasis dx 10 years ago + psoriatic arthritis  Onset: 10 yrs ago  Symptoms: itching and flaking of scalp  Aggravating factors: none  Alleviating factors: clobetasol foam or solution (prefers foam)  Treatments: see above  Has seen a number of rheumatologists over the years but is not seeking treatment for arthritis at this time, prefers to continue with lifestyle modifications as she is going through some neurological work up at this time.    History of skin cancer: No  History of precancers/actinic keratoses: No  History of biopsies:No  History of blistering/severe sunburns:No  Family history of skin cancer:No  Family history of atypical moles:Yes, Details: mom unknown     Tobacco use: Never  Alcohol use:DRINKS: occasionally in social situations  Allergies:Yes, Details: see allergies     Past Medical History:   Diagnosis Date   • Adenomyosis     aug 2018   • Allergy    • Anxiety    • Depression    • Generalized hypermobility of joints    • Genital HSV    • HLA B27 (HLA B27 positive)    • Meningioma (HCC)     Jan 2017   • MTHFR gene mutation (HCC)    • 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   • Psoriatic arthritis (HCC)         Family History   Problem Relation Age of Onset   • Psychiatric Illness Mother         depression   • Hypertension Mother    • Arthritis Father         psoriatic   • Hypertension Father    • Psychiatric Illness Father         depression   • Cancer Maternal Grandmother    • Cancer Maternal  Grandfather         stomach   • Other Son         speech apraxia        Social History     Socioeconomic History   • Marital status:      Spouse name: Not on file   • Number of children: Not on file   • Years of education: Not on file   • Highest education level: Not on file   Occupational History   • Not on file   Social Needs   • Financial resource strain: Not on file   • Food insecurity     Worry: Not on file     Inability: Not on file   • Transportation needs     Medical: Not on file     Non-medical: Not on file   Tobacco Use   • Smoking status: Never Smoker   • Smokeless tobacco: Never Used   Substance and Sexual Activity   • Alcohol use: Yes     Alcohol/week: 0.0 - 3.6 oz     Comment: just on the weekends   • Drug use: No     Comment: cbd oil prn   • Sexual activity: Yes     Partners: Male     Birth control/protection: Condom   Lifestyle   • Physical activity     Days per week: Not on file     Minutes per session: Not on file   • Stress: Not on file   Relationships   • Social connections     Talks on phone: Not on file     Gets together: Not on file     Attends Judaism service: Not on file     Active member of club or organization: Not on file     Attends meetings of clubs or organizations: Not on file     Relationship status: Not on file   • Intimate partner violence     Fear of current or ex partner: Not on file     Emotionally abused: Not on file     Physically abused: Not on file     Forced sexual activity: Not on file   Other Topics Concern   • Not on file   Social History Narrative   • Not on file        Allergies   Allergen Reactions   • Doxycycline Vomiting     2016         MEDICATIONS:  Medications relevant to specialty reviewed.    Current Outpatient Medications:   •  sertraline (ZOLOFT) 25 MG tablet, TAKE 1 TABLET BY MOUTH EVERY DAY, Disp: 90 Tab, Rfl: 0  •  naproxen (NAPROSYN) 500 MG Tab, Take 1 Tab by mouth 2 times daily with meals as needed., Disp: 30 Tab, Rfl: 0  •  clobetasol (OLUX)  0.05 % foam, Apply once daily to affected area of scalp as needed until scalp is smooth, Disp: 1 Can, Rfl: 3  •  acyclovir (ZOVIRAX) 400 MG tablet, Take 1 Tab by mouth 3 times a day. Take at first sign of symptoms for 5 days. (Patient not taking: Reported on 8/22/2019), Disp: 15 Tab, Rfl: 2  •  IRON PO, Take  by mouth., Disp: , Rfl:   •  Ascorbic Acid (VITAMIN C) 1000 MG Tab, Take  by mouth., Disp: , Rfl:   •  5-Methyltetrahydrofolate (METHYL FOLATE) Powder, 7.5 mg by Does not apply route every day., Disp: , Rfl:   •  Turmeric 450 MG Cap, Take  by mouth., Disp: , Rfl:   •  Omega-3 Fatty Acids (FISH OIL) 1000 MG Cap capsule, Take 1,000 mg by mouth 3 times a day, with meals., Disp: , Rfl:   •  zinc sulfate 1 MG/ML Solution, by Intravenous route., Disp: , Rfl:   •  cyanocobalamin (VITAMIN B-12) 500 MCG Tab, Take 500 mcg by mouth every day., Disp: , Rfl:   •  vitamin D (CHOLECALCIFEROL) 1000 UNIT Tab, Take 6,000 Units by mouth every day., Disp: , Rfl:      REVIEW OF SYSTEMS:   Positive for skin (see HPI) + joint pains  Negative for fevers and chills       EXAM:  There were no vitals taken for this visit.  Constitutional: Well-developed, well-nourished, and in no distress.   HENT: Head normocephalic and atraumatic.   Neurological: Alert and oriented.    A focused skin exam was performed including the affected areas of the head (including face), neck, chest, abdomen, back, bilateral upper extremities and bilateral lower extremities. Notable findings on exam today listed below.     - left temporal with well demarcated erythematous plaque with micaceous scale  - left arm and right posterior leg with firm well demarcated papules with dimpling on lateral pressure and surrounding post-inflammatory hyperpigmentation  - right lower leg with brown-grey, waxy, hyperkeratotic papule    IMPRESSION / PLAN:    1. Psoriasis, BSA <1% + Psoriatic arthritis  -psoriatic arthritis? yes  -educated patient about diagnosis, management options,  and expectations of treatment  -discussed the nature of the condition and that there is no cure   -discussed associated co-morbidities, including systemic inflammation (i.e. Joint involvement, cardiovascular disease); smoking cessation discussed  -cont  clobetasol 0.05% foam to affected area of the scalp daily (pt to call if too expensive and will send rx to [facility] pharmacy)  -Side effects of topical steroids discussed, including systemic absorption, skin thinning, appearance of stretch marks (striae), easy bruising, telangiectasias, and possible increased hair growth   -T/sal (salicylic acid 3%) shampoo 2-3x/week. To leave on scalp for 5-10 minutes prior to rinsing  -Has seen a number of rheumatologists over the years but is not seeking treatment for arthritis at this time, prefers to continue with lifestyle modifications as she is going through some neurological work up at this time.  -discussed option of ILK again given localized disease, patient agreeable    Procedure: Informed consent was obtained.  The procedure, risks and complications including scar, bleeding, discoloration, atrophy, infection, recurrence were explained in detail and understood by the patient.  The area(s) above (left temporal) was infiltrated with 5mg/ml of kenalog.   Total amount injected: 1 ml. The patient tolerated the procedure well.      2. Chronic urticaria  - no rash today  - start daily scheduled antihistamine (zyrtec or allegra)    3. Dermatofibroma  -nature of the diagnosis was discussed in detail  -clinically benign today on exam, reassurance was given  -continue to monitor for any changes, pt to call if any changes occur    4. Seborrheic keratoses  -nature of the diagnosis was discussed in detail  -clinically benign today on exam, reassurance was given  -continue to monitor for any changes, pt to call if any changes occur    Return to clinic in: Return 4-6 weeks for repeat ILK. and as needed for any new or changing skin lesions or  sooner for ILK if patient desires.    Inge Carrasco M.D.

## 2020-12-04 ENCOUNTER — APPOINTMENT (OUTPATIENT)
Dept: DERMATOLOGY | Facility: IMAGING CENTER | Age: 40
End: 2020-12-04
Payer: COMMERCIAL

## 2021-01-14 ENCOUNTER — OFFICE VISIT (OUTPATIENT)
Dept: MEDICAL GROUP | Facility: IMAGING CENTER | Age: 41
End: 2021-01-14
Payer: COMMERCIAL

## 2021-01-14 VITALS
BODY MASS INDEX: 25.61 KG/M2 | DIASTOLIC BLOOD PRESSURE: 72 MMHG | HEIGHT: 64 IN | OXYGEN SATURATION: 100 % | HEART RATE: 82 BPM | TEMPERATURE: 98.3 F | SYSTOLIC BLOOD PRESSURE: 120 MMHG | WEIGHT: 150 LBS | RESPIRATION RATE: 12 BRPM

## 2021-01-14 DIAGNOSIS — A04.8 H. PYLORI INFECTION: ICD-10-CM

## 2021-01-14 DIAGNOSIS — M79.18 MYOFASCIAL PAIN: ICD-10-CM

## 2021-01-14 DIAGNOSIS — L40.50 PSORIATIC ARTHRITIS (HCC): ICD-10-CM

## 2021-01-14 DIAGNOSIS — R09.81 NASAL CONGESTION: ICD-10-CM

## 2021-01-14 DIAGNOSIS — M25.50 ARTHRALGIA, UNSPECIFIED JOINT: ICD-10-CM

## 2021-01-14 PROCEDURE — 99214 OFFICE O/P EST MOD 30 MIN: CPT | Performed by: FAMILY MEDICINE

## 2021-01-14 SDOH — HEALTH STABILITY: MENTAL HEALTH: HOW MANY STANDARD DRINKS CONTAINING ALCOHOL DO YOU HAVE ON A TYPICAL DAY?: 1 OR 2

## 2021-01-14 SDOH — HEALTH STABILITY: MENTAL HEALTH: HOW OFTEN DO YOU HAVE A DRINK CONTAINING ALCOHOL?: 4 OR MORE TIMES A WEEK

## 2021-01-14 SDOH — HEALTH STABILITY: PHYSICAL HEALTH: ON AVERAGE, HOW MANY MINUTES DO YOU ENGAGE IN EXERCISE AT THIS LEVEL?: 40 MIN

## 2021-01-14 SDOH — HEALTH STABILITY: PHYSICAL HEALTH: ON AVERAGE, HOW MANY DAYS PER WEEK DO YOU ENGAGE IN MODERATE TO STRENUOUS EXERCISE (LIKE A BRISK WALK)?: 3 DAYS

## 2021-01-14 ASSESSMENT — PATIENT HEALTH QUESTIONNAIRE - PHQ9: CLINICAL INTERPRETATION OF PHQ2 SCORE: 0

## 2021-01-14 ASSESSMENT — PAIN SCALES - GENERAL: PAINLEVEL: 3=SLIGHT PAIN

## 2021-01-14 ASSESSMENT — FIBROSIS 4 INDEX: FIB4 SCORE: 0.51

## 2021-01-14 NOTE — PROGRESS NOTES
SUBJECTIVE:        Chief Complaint   Patient presents with   • Arthritis     joint pain worsening. last 2 months right hip and knee very achey   • Other     feels like blockage in nostril- left sided x6 weeks   • Other     lisa brar- working on hpylori diagnosis       HPI:     Nubia Arce is a 40 y.o. female with hypothyroidism, psoriatic arthritis, seasonal depression, anxiety, pineal gland cyst, meningioma, MTHFR gene mutation and reflux here for symptoms of joint pain, blockage of left nostril and update of GI status.    Worse joint pain- dull and sometimes sharp ache. HLA-B27.  Psoriatic arthritis.  New symptoms of right hip and knee area mostly. At night keeps her up.   Established with rheumatology in April, plans to do xray for spine.  Has had back issues previously.  Needs to reschedule.   Taking advil 400 mg once a day past couple week, helps with current symptoms..   Feels has inflammation.   Renovations for 5 months of her home but will be moving back in soon.  Hypermobile per PT. reviewed with GI who did not suspect E. Danlos.   No stretching and strengthening routinely lately.   No hip dysplasia.   No injury.   Throbbing- right posterior lateral hip but deep.   Inferior to knee pain- no swelling.   More at night if sleeping on it.   Slight tingling, not new. Right side ankle injury in past.   Has had prior issues with low back pain.    States she has been to performance chiropractor's in the past.    Awaiting H pylori results- tried natural supplements. Had antibiotic probiotic.    Functional medicine [facility].  Nutrition and health .     Left nostril blocked- has not tried netipot. Doing saline.   No rhinorrhea.  Recent renovations in her home could be a possible contributing factor.  Right side of her ear with slight discomfort but states she has been clenching her jaw.      ROS:  Denies any recent fevers or chills. No nausea or vomiting. No diarrhea. No chest pains or shortness of  breath. No lower extremity edema.    Current Outpatient Medications on File Prior to Visit   Medication Sig Dispense Refill   • Multiple Vitamins-Minerals (ZINC PO) Take  by mouth.     • acyclovir (ZOVIRAX) 400 MG tablet Take 1 Tab by mouth 3 times a day. Take at first sign of symptoms for 5 days. 15 Tab 2   • sertraline (ZOLOFT) 25 MG tablet Take 1.5 Tabs by mouth every day. 135 Tab 0   • clobetasol (OLUX) 0.05 % foam Apply once daily to affected area of scalp as needed until scalp is smooth 50 g 4   • naproxen (NAPROSYN) 500 MG Tab Take 1 Tab by mouth 2 times daily with meals as needed. 30 Tab 0   • IRON PO Take  by mouth.     • Ascorbic Acid (VITAMIN C) 1000 MG Tab Take  by mouth.     • Turmeric 450 MG Cap Take  by mouth.     • Omega-3 Fatty Acids (FISH OIL) 1000 MG Cap capsule Take 1,000 mg by mouth 3 times a day, with meals.     • cyanocobalamin (VITAMIN B-12) 500 MCG Tab Take 500 mcg by mouth every day.     • 5-Methyltetrahydrofolate (METHYL FOLATE) Powder 7.5 mg by Does not apply route every day.     • vitamin D (CHOLECALCIFEROL) 1000 UNIT Tab Take 6,000 Units by mouth every day.       No current facility-administered medications on file prior to visit.        Allergies   Allergen Reactions   • Doxycycline Vomiting     2016        Patient Active Problem List    Diagnosis Date Noted   • Tension-type headache, not intractable 03/26/2020   • H. pylori infection 03/26/2020   • Dense breast tissue 07/12/2019   • MTHFR gene mutation ([facility])    • Breast tenderness in female 01/17/2019   • Acquired hypothyroidism 11/07/2018   • Iron deficiency anemia due to chronic blood loss 07/27/2018   • Dysesthesia 05/25/2018   • Meningioma ([facility]) 05/25/2018   • Lymphadenopathy of head and neck 02/01/2018   • Abnormal brain MRI 01/23/2018   • Pineal gland cyst 01/23/2018   • Dizziness 12/29/2017   • Seasonal depression ([facility]) 12/29/2017   • Numbness and tingling of left side of face 09/05/2017   • Decreased sensation of lower  "extremity 06/30/2017   • Gastroesophageal reflux disease 06/30/2017   • Neck pain 03/13/2017   • Psoriatic arthritis (HCC) 02/09/2017   • Chronic LUQ pain 02/09/2017       Past Medical History:   Diagnosis Date   • Adenomyosis     aug 2018   • Allergy    • Anxiety    • Depression    • Generalized hypermobility of joints    • Genital HSV    • HLA B27 (HLA B27 positive)    • Meningioma (HCC)     Jan 2017   • MTHFR gene mutation (HCC)    • 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   • Psoriatic arthritis (HCC)              OBJECTIVE:   /72   Pulse 82   Temp 36.8 °C (98.3 °F)   Resp 12   Ht 1.626 m (5' 4\")   Wt 68 kg (150 lb)   LMP 01/08/2021 (Exact Date)   SpO2 100%   BMI 25.75 kg/m²   General: Well-developed well-nourished female, no acute distress  HEENT: oropharynx clear, eyes clear, TMs clear and intact, nose-left nasal mucosa with slight edema  Neck: supple, no lymphadenopathy- cervical or supraclavicular, no thyromegaly  Cardiovascular: regular rate and rhythm, no murmurs, gallops, rubs  Lungs: clear to auscultation bilaterally, no wheezes, crackles, or rhonchi  Abdomen: +bowel sounds, soft, nontender, nondistended, no rebound, no guarding, no hepatosplenomegaly  Extremities: no cyanosis, clubbing, edema  Skin: Warm and dry  Psych: appropriate mood and affect        ASSESSMENT/PLAN:    40 y.o.female with hypothyroidism, psoriatic arthritis, seasonal depression, anxiety, pineal gland cyst, meningioma, MTHFR gene mutation and reflux.      1. Arthralgia, unspecified joint     2. Myofascial pain     3. Psoriatic arthritis (HCC)     4. H. pylori infection     5. Nasal congestion     -Arthralgia/myofascial pain-prior history of right ankle injury may be contributing to symptoms.  She does have psoriatic arthritis but current symptoms appear more so related to myofascial symptoms.    Recommend routine stretching and strengthening exercises.  Patient given resources for exercise program.  Recommend " healthy/anti-inflammatory diet.  Counseled on routine self-care and stress management.  Discussed use ibuprofen 400 mg 1-2 times as needed daily with food, best to limit use as needed.  Reviewed precautions of medication use due to history of H. Pylori. Follow-up if no improvement in symptoms in 6 weeks.  -Psoriatic arthritis-recently established with rheumatology.  Continue routine follow-up.  -H. pylori infection-awaiting results post treatment.  -Nasal congestion-appears likely associated with environmental allergens.  Recommend replacing air filters.  Recommend consider use of neti pot or over the counter allergy medication.  If no improvement could consider Flonase.    Return in about 3 months (around 4/14/2021).    This medical record contains text that has been entered with the assistance of computer voice recognition and dictation software.  Therefore, it may contain unintended errors in text, spelling, punctuation, or grammar.

## 2021-01-29 ENCOUNTER — APPOINTMENT (OUTPATIENT)
Dept: MEDICAL GROUP | Facility: IMAGING CENTER | Age: 41
End: 2021-01-29

## 2021-02-02 ENCOUNTER — APPOINTMENT (OUTPATIENT)
Dept: MEDICAL GROUP | Facility: IMAGING CENTER | Age: 41
End: 2021-02-02

## 2021-02-04 ENCOUNTER — APPOINTMENT (OUTPATIENT)
Dept: MEDICAL GROUP | Facility: IMAGING CENTER | Age: 41
End: 2021-02-04

## 2021-03-12 ENCOUNTER — OFFICE VISIT (OUTPATIENT)
Dept: MEDICAL GROUP | Facility: IMAGING CENTER | Age: 41
End: 2021-03-12
Payer: COMMERCIAL

## 2021-03-12 ENCOUNTER — HOSPITAL ENCOUNTER (OUTPATIENT)
Facility: MEDICAL CENTER | Age: 41
End: 2021-03-12
Attending: FAMILY MEDICINE
Payer: COMMERCIAL

## 2021-03-12 VITALS
OXYGEN SATURATION: 100 % | WEIGHT: 149 LBS | RESPIRATION RATE: 12 BRPM | HEART RATE: 88 BPM | TEMPERATURE: 97.1 F | DIASTOLIC BLOOD PRESSURE: 80 MMHG | SYSTOLIC BLOOD PRESSURE: 124 MMHG | BODY MASS INDEX: 25.44 KG/M2 | HEIGHT: 64 IN

## 2021-03-12 DIAGNOSIS — R53.83 FATIGUE, UNSPECIFIED TYPE: ICD-10-CM

## 2021-03-12 DIAGNOSIS — R53.1 FEELING WEAK: ICD-10-CM

## 2021-03-12 DIAGNOSIS — Z12.31 ENCOUNTER FOR SCREENING MAMMOGRAM FOR MALIGNANT NEOPLASM OF BREAST: ICD-10-CM

## 2021-03-12 DIAGNOSIS — Z13.1 SCREENING FOR DIABETES MELLITUS: ICD-10-CM

## 2021-03-12 DIAGNOSIS — Z13.220 SCREENING, LIPID: ICD-10-CM

## 2021-03-12 DIAGNOSIS — Z87.440 HISTORY OF UTI: ICD-10-CM

## 2021-03-12 DIAGNOSIS — Z12.4 SCREENING FOR CERVICAL CANCER: ICD-10-CM

## 2021-03-12 DIAGNOSIS — Z01.419 WELL WOMAN EXAM WITH ROUTINE GYNECOLOGICAL EXAM: ICD-10-CM

## 2021-03-12 DIAGNOSIS — Z13.0 SCREENING FOR DEFICIENCY ANEMIA: ICD-10-CM

## 2021-03-12 DIAGNOSIS — N93.9 VAGINAL SPOTTING: ICD-10-CM

## 2021-03-12 DIAGNOSIS — N80.03 ADENOMYOSIS: ICD-10-CM

## 2021-03-12 PROCEDURE — 88175 CYTOPATH C/V AUTO FLUID REDO: CPT

## 2021-03-12 PROCEDURE — 87624 HPV HI-RISK TYP POOLED RSLT: CPT

## 2021-03-12 PROCEDURE — 99396 PREV VISIT EST AGE 40-64: CPT | Performed by: FAMILY MEDICINE

## 2021-03-12 ASSESSMENT — PAIN SCALES - GENERAL: PAINLEVEL: 4=SLIGHT-MODERATE PAIN

## 2021-03-12 ASSESSMENT — FIBROSIS 4 INDEX: FIB4 SCORE: 0.51

## 2021-03-12 NOTE — PROGRESS NOTES
Chief Complaint   Patient presents with   • Gynecologic Exam     spotting in between periods, pain with ovulation         <SUBJECTIVE>  Nubia Arce is a 40 y.o. female for routine annual evaluation.     Patient's last menstrual period was 03/08/2021 (exact date).    Spotting between periods on and off for a couple years, was better then started back again. On and off has had painful ovulation. Lately more painful with ovulation. More so on left.  Has had left abdominal symptoms, but tugs on area with ovulation.   Adenomysosis a few years ago diagnosed.   More frequent headaches, mood changes.   8/2018- had biopsy in past.   Sees women's health next month.     No UTI lately, but last year 3 times. Started on D mannose.     Working with nutritionist- [facility]- functional medicine.   Low on glutathione noted.   Heavy metals test- mercury and lead.   Minimally elevated hgba1c on prior labs.     One dose Pedro Luis- Pedro Luis COVID-19 vaccine.   Feeling tired. Got fever 24 hour 102 deg F fever. Some chills, took ibuprofen. Feels weakness and fatigued. Exhausted muscles.   Nausea intermittent.     Health Maintenance:  Last pap: 2018- had ultrasound as well  Diet: healthy diet- gluten and lactose.   Exercise: not regular, hard to motivate herself. Meditation/yoga. Walks dog.   Immunizations: as noted  Mammogram: 6/2020    Immunization History   Administered Date(s) Administered   • Influenza Vaccine Quad Inj (Pf) 09/26/2020         Current Outpatient Medications   Medication Sig Dispense Refill   • D-MANNOSE PO Take  by mouth.     • CRANBERRY PO Take  by mouth.     • Multiple Vitamins-Minerals (ZINC PO) Take  by mouth.     • acyclovir (ZOVIRAX) 400 MG tablet Take 1 Tab by mouth 3 times a day. Take at first sign of symptoms for 5 days. 15 Tab 2   • sertraline (ZOLOFT) 25 MG tablet Take 1.5 Tabs by mouth every day. 135 Tab 0   • clobetasol (OLUX) 0.05 % foam Apply once daily to affected area of scalp as needed until  scalp is smooth 50 g 4   • naproxen (NAPROSYN) 500 MG Tab Take 1 Tab by mouth 2 times daily with meals as needed. 30 Tab 0   • IRON PO Take  by mouth.     • Ascorbic Acid (VITAMIN C) 1000 MG Tab Take  by mouth.     • Turmeric 450 MG Cap Take  by mouth.     • Omega-3 Fatty Acids (FISH OIL) 1000 MG Cap capsule Take 1,000 mg by mouth 3 times a day, with meals.     • cyanocobalamin (VITAMIN B-12) 500 MCG Tab Take 500 mcg by mouth every day.     • vitamin D (CHOLECALCIFEROL) 1000 UNIT Tab Take 6,000 Units by mouth every day.       No current facility-administered medications for this visit.     Drug allergies: Doxycycline    OB History    Para Term  AB Living   3 1 1 0 2 0   SAB TAB Ectopic Molar Multiple Live Births   2 0 0 0 0 0   Obstetric Comments   2 prior miscarriage- 1 set of twins other at 14 weeks       Past Medical History:   Diagnosis Date   • Adenomyosis     aug 2018   • Allergy    • Anxiety    • Depression    • Generalized hypermobility of joints    • Genital HSV    • HLA B27 (HLA B27 positive)    • Meningioma (HCC)     2017   • MTHFR gene mutation ([facility])    • Pineal gland cyst 2017   • Psoriatic arthritis (HCC)        Past Surgical History:   Procedure Laterality Date   • LUMPECTOMY      left breast fibroadenoma removed   • PRIMARY C SECTION         Family History   Problem Relation Age of Onset   • Psychiatric Illness Mother         depression   • Hypertension Mother    • Arthritis Father         psoriatic   • Hypertension Father    • Psychiatric Illness Father         depression   • Cancer Maternal Grandmother    • Cancer Maternal Grandfather         stomach   • Other Son         speech apraxia       Social History     Socioeconomic History   • Marital status:      Spouse name: Not on file   • Number of children: Not on file   • Years of education: Not on file   • Highest education level: Not on file   Occupational History   • Not on file   Tobacco Use   • Smoking status:  "Never Smoker   • Smokeless tobacco: Never Used   Substance and Sexual Activity   • Alcohol use: Yes     Alcohol/week: 0.0 - 3.6 oz   • Drug use: No     Comment: cbd oil prn   • Sexual activity: Yes     Partners: Male     Birth control/protection: Condom   Other Topics Concern   • Not on file   Social History Narrative   • Not on file       ROS:  No fevers/chills. No TIA's, no dysphagia. No prolonged cough. No dyspnea or chest pain on exertion.  No black or bloody stools.  No urinary tract symptoms. No new or enlarging breast lumps, nipple discharge or breast pain. Gyn: No abnormal discharge.    <OBJECTIVE>  /80   Pulse 88   Temp 36.2 °C (97.1 °F)   Resp 12   Ht 1.626 m (5' 4\")   Wt 67.6 kg (149 lb)   LMP 03/08/2021 (Exact Date)   SpO2 100%   BMI 25.58 kg/m²   HEAD AND NECK:  Ears normal.  Patient wearing mask.   Neck supple. No adenopathy or masses in the neck or supraclavicular regions.  No thyromegaly.  NEURO: Cranial nerves are normal in visible areas. Neck supple. DTR's normal and symmetric.  CHEST:  Clear, good air entry, no wheezes, rhonchi or rales.  HEART:  S1 and S2 normal, no murmurs, clicks, gallops or rubs. Regular rate and rhythm.  No edema.  ABDOMEN:  Soft without tenderness, guarding, mass or organomegaly. No CVA tenderness or inguinal adenopathy.  EXTREMITIES:  Extremities, reflexes and peripheral pulses are normal.  SKIN:  No rashes or suspicious skin lesions noted.  BREAST EXAM: Inspection negative. No nipple discharge or bleeding. No masses or nodularity palpable. No axillary ALLEY.   PELVIC EXAM: External genitalia and vagina normal. Normal appearing cervix. No abnormal discharge. No cervical motion tenderness. No inguinal ALLEY.  Bimanual exam without adnexal masses or tenderness to palpation.       ASSESSMENT/PLAN:    41 yo female for routine well woman and gynecological exam.     1. Well woman exam with routine gynecological exam     2. Screening for cervical cancer  THINPREP PAP " WITH HPV   3. Vaginal spotting  US-PELVIC COMPLETE (TRANSABDOMINAL/TRANSVAGINAL) (COMBO)    TSH WITH REFLEX TO FT4   4. Adenomyosis  US-PELVIC COMPLETE (TRANSABDOMINAL/TRANSVAGINAL) (COMBO)   5. History of UTI     6. Encounter for screening mammogram for malignant neoplasm of breast  US-SCREENING WHOLE BREAST (3D SCREENING)    [id number]   7. Fatigue, unspecified type     8. Feeling weak     9. Screening for diabetes mellitus  Comp Metabolic Panel   10. Screening, lipid  Lipid Profile   11. Screening for deficiency anemia  CBC WITHOUT DIFFERENTIAL   -Recommend healthy diet and routine exercise.  -Screenings for cervical cancer -Pap smear completed today.  -Vaginal spotting-prior diagnosis of adenomyosis.  Notes ovulatory symptoms as well.  Will further assess with repeat ultrasound imaging. Patient scheduled with gynecology next month.   -History of UTI-appears stable on d-mannose.  Continue supplement.  Continue urinary hygiene.  -Mammogram screening recommended.  -Fatigue and feeling weak-notes after having COVID-19 vaccination.  Otherwise normal strength on exam noted.    We will continue to monitor at this time.  Obtain routine lab work as above.    Follow up after labs and imaging complete. Follow up sooner as needed.     This medical record contains text that has been entered with the assistance of computer voice recognition and dictation software.  Therefore, it may contain unintended errors in text, spelling, punctuation, or grammar.

## 2021-03-15 LAB
CYTOLOGY REG CYTOL: NORMAL
HPV HR 12 DNA CVX QL NAA+PROBE: NEGATIVE
HPV16 DNA SPEC QL NAA+PROBE: NEGATIVE
HPV18 DNA SPEC QL NAA+PROBE: NEGATIVE
SPECIMEN SOURCE: NORMAL

## 2021-03-28 ENCOUNTER — HOSPITAL ENCOUNTER (OUTPATIENT)
Facility: MEDICAL CENTER | Age: 41
End: 2021-03-28
Attending: FAMILY MEDICINE
Payer: COMMERCIAL

## 2021-03-28 ENCOUNTER — OFFICE VISIT (OUTPATIENT)
Dept: URGENT CARE | Facility: CLINIC | Age: 41
End: 2021-03-28
Payer: COMMERCIAL

## 2021-03-28 VITALS
TEMPERATURE: 98 F | RESPIRATION RATE: 14 BRPM | HEART RATE: 90 BPM | WEIGHT: 152 LBS | HEIGHT: 64 IN | DIASTOLIC BLOOD PRESSURE: 78 MMHG | SYSTOLIC BLOOD PRESSURE: 114 MMHG | OXYGEN SATURATION: 99 % | BODY MASS INDEX: 25.95 KG/M2

## 2021-03-28 DIAGNOSIS — N30.01 ACUTE CYSTITIS WITH HEMATURIA: ICD-10-CM

## 2021-03-28 LAB
APPEARANCE UR: NORMAL
BILIRUB UR STRIP-MCNC: NEGATIVE MG/DL
CANDIDA DNA VAG QL PROBE+SIG AMP: NEGATIVE
COLOR UR AUTO: YELLOW
G VAGINALIS DNA VAG QL PROBE+SIG AMP: NEGATIVE
GLUCOSE UR STRIP.AUTO-MCNC: NEGATIVE MG/DL
KETONES UR STRIP.AUTO-MCNC: NEGATIVE MG/DL
LEUKOCYTE ESTERASE UR QL STRIP.AUTO: NEGATIVE
NITRITE UR QL STRIP.AUTO: NEGATIVE
PH UR STRIP.AUTO: 7 [PH] (ref 5–8)
PROT UR QL STRIP: NEGATIVE MG/DL
RBC UR QL AUTO: NEGATIVE
SP GR UR STRIP.AUTO: 1.01
T VAGINALIS DNA VAG QL PROBE+SIG AMP: NEGATIVE
UROBILINOGEN UR STRIP-MCNC: 0.2 MG/DL

## 2021-03-28 PROCEDURE — 87086 URINE CULTURE/COLONY COUNT: CPT

## 2021-03-28 PROCEDURE — 81002 URINALYSIS NONAUTO W/O SCOPE: CPT | Performed by: FAMILY MEDICINE

## 2021-03-28 PROCEDURE — 87510 GARDNER VAG DNA DIR PROBE: CPT

## 2021-03-28 PROCEDURE — 87480 CANDIDA DNA DIR PROBE: CPT

## 2021-03-28 PROCEDURE — 87660 TRICHOMONAS VAGIN DIR PROBE: CPT

## 2021-03-28 PROCEDURE — 99213 OFFICE O/P EST LOW 20 MIN: CPT | Performed by: FAMILY MEDICINE

## 2021-03-28 RX ORDER — NITROFURANTOIN 25; 75 MG/1; MG/1
100 CAPSULE ORAL EVERY 12 HOURS
Qty: 10 CAPSULE | Refills: 0 | Status: SHIPPED | OUTPATIENT
Start: 2021-03-28 | End: 2021-04-02

## 2021-03-28 ASSESSMENT — ENCOUNTER SYMPTOMS
ABDOMINAL PAIN: 1
SORE THROAT: 0
COUGH: 0
FEVER: 0
VOMITING: 0

## 2021-03-28 ASSESSMENT — FIBROSIS 4 INDEX: FIB4 SCORE: 0.51

## 2021-03-28 NOTE — PROGRESS NOTES
Subjective:     Nubia Arce is a 40 y.o. female who presents for Urinary Pain ([id number], burning to urinate, frequent urination, tenderness in abdominal area, lower back pain, cloudy urine, blood in urine)    HPI  Pt presents for evaluation of an acute problem  Pt with dysuria, urinary frequency, and lower abd pain for the past 10 days   Had some vaginal bleeding about 1 week ago which was only 1 day   Had a little vaginal discharge which has currently resolved  Abdominal pain is more in the lower abdomen  Patient using d-mannose, cranberry, and a few other over-the-counter remedies which she feels are helping her symptoms some, however not fully resolving    Review of Systems   Constitutional: Negative for fever.   HENT: Negative for sore throat.    Respiratory: Negative for cough.    Gastrointestinal: Positive for abdominal pain. Negative for vomiting.   Genitourinary: Positive for dysuria, frequency, hematuria and urgency.   Skin: Negative for rash.     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  MEDS:   Current Outpatient Medications:   •  D-MANNOSE PO, Take  by mouth., Disp: , Rfl:   •  CRANBERRY PO, Take  by mouth., Disp: , Rfl:   •  Multiple Vitamins-Minerals (ZINC PO), Take  by mouth., Disp: , Rfl:   •  acyclovir (ZOVIRAX) 400 MG tablet, Take 1 Tab by mouth 3 times a day. Take at first sign of symptoms for 5 days., Disp: 15 Tab, Rfl: 2  •  sertraline (ZOLOFT) 25 MG tablet, Take 1.5 Tabs by mouth every day., Disp: 135 Tab, Rfl: 0  •  naproxen (NAPROSYN) 500 MG Tab, Take 1 Tab by mouth 2 times daily with meals as needed., Disp: 30 Tab, Rfl: 0  •  IRON PO, Take  by mouth., Disp: , Rfl:   •  Ascorbic Acid (VITAMIN C) 1000 MG Tab, Take  by mouth., Disp: , Rfl:   •  Turmeric 450 MG Cap,  "Take  by mouth., Disp: , Rfl:   •  Omega-3 Fatty Acids (FISH OIL) 1000 MG Cap capsule, Take 1,000 mg by mouth 3 times a day, with meals., Disp: , Rfl:   •  cyanocobalamin (VITAMIN B-12) 500 MCG Tab, Take 500 mcg by mouth every day., Disp: , Rfl:   •  vitamin D (CHOLECALCIFEROL) 1000 UNIT Tab, Take 6,000 Units by mouth every day., Disp: , Rfl:   •  clobetasol (OLUX) 0.05 % foam, Apply once daily to affected area of scalp as needed until scalp is smooth, Disp: 50 g, Rfl: 4  ALLERGIES:   Allergies   Allergen Reactions   • Doxycycline Vomiting     2016      SURGHX:   Past Surgical History:   Procedure Laterality Date   • LUMPECTOMY  1996    left breast fibroadenoma removed   • PRIMARY C SECTION       SOCHX:  reports that she has never smoked. She has never used smokeless tobacco. She reports current alcohol use. She reports that she does not use drugs.     Objective:   /78 (BP Location: Left arm, Patient Position: Sitting, BP Cuff Size: Adult)   Pulse 90   Temp 36.7 °C (98 °F) (Temporal)   Resp 14   Ht 1.626 m (5' 4\")   Wt 68.9 kg (152 lb)   LMP 03/08/2021 (Exact Date)   SpO2 99%   BMI 26.09 kg/m²     Physical Exam  Constitutional:       General: She is not in acute distress.     Appearance: She is well-developed. She is not diaphoretic.   HENT:      Head: Normocephalic and atraumatic.   Pulmonary:      Effort: Pulmonary effort is normal.   Abdominal:      General: Abdomen is flat. Bowel sounds are normal. There is no distension.      Palpations: Abdomen is soft.      Tenderness: There is no right CVA tenderness, left CVA tenderness, guarding or rebound.      Comments: +TTP in suprapubic region   Skin:     General: Skin is warm and dry.      Findings: No erythema.   Neurological:      Mental Status: She is alert and oriented to person, place, and time.   Psychiatric:         Behavior: Behavior normal.         Thought Content: Thought content normal.         Judgment: Judgment normal. "         Assessment/Plan:   Assessment    1. Acute cystitis with hematuria  - POCT Urinalysis  - Urine Culture; Future  - nitrofurantoin (MACROBID) 100 MG Cap; Take 1 capsule by mouth every 12 hours for 5 days.  Dispense: 10 capsule; Refill: 0  - VAGINAL PATHOGENS DNA PANEL; Future    Patient with acute cystitis.  Will empirically treat with Macrobid and send urine for culture.  Patient did have some vaginal bleeding and discharge throughout this course which is not currently present today.  Did recommend further evaluation with vaginal pathogens panel.  We will follow-up test results and may need to start second treatment for yeast/BV if testing comes back positive.

## 2021-03-30 LAB
BACTERIA UR CULT: NORMAL
SIGNIFICANT IND 70042: NORMAL
SITE SITE: NORMAL
SOURCE SOURCE: NORMAL

## 2021-04-16 ENCOUNTER — APPOINTMENT (OUTPATIENT)
Dept: RADIOLOGY | Facility: MEDICAL CENTER | Age: 41
End: 2021-04-16
Attending: FAMILY MEDICINE
Payer: COMMERCIAL

## 2021-04-16 ENCOUNTER — OFFICE VISIT (OUTPATIENT)
Dept: MEDICAL GROUP | Facility: IMAGING CENTER | Age: 41
End: 2021-04-16
Payer: COMMERCIAL

## 2021-04-16 VITALS
OXYGEN SATURATION: 100 % | WEIGHT: 151 LBS | HEIGHT: 64 IN | RESPIRATION RATE: 16 BRPM | BODY MASS INDEX: 25.78 KG/M2 | HEART RATE: 78 BPM | TEMPERATURE: 98.4 F | SYSTOLIC BLOOD PRESSURE: 132 MMHG | DIASTOLIC BLOOD PRESSURE: 82 MMHG

## 2021-04-16 DIAGNOSIS — R59.0 ENLARGED LYMPH NODE IN NECK: ICD-10-CM

## 2021-04-16 DIAGNOSIS — R39.11 URINARY HESITANCY: ICD-10-CM

## 2021-04-16 DIAGNOSIS — L29.9 ITCHING: ICD-10-CM

## 2021-04-16 DIAGNOSIS — L40.9 PSORIASIS: ICD-10-CM

## 2021-04-16 DIAGNOSIS — R33.9 INCOMPLETE EMPTYING OF BLADDER: ICD-10-CM

## 2021-04-16 DIAGNOSIS — R39.9 URINARY SYMPTOM OR SIGN: ICD-10-CM

## 2021-04-16 PROCEDURE — 99214 OFFICE O/P EST MOD 30 MIN: CPT | Performed by: FAMILY MEDICINE

## 2021-04-16 RX ORDER — OMEGA-3 FATTY ACIDS/FISH OIL 300-1000MG
CAPSULE ORAL 2 TIMES DAILY
COMMUNITY
End: 2022-11-10

## 2021-04-16 ASSESSMENT — FIBROSIS 4 INDEX: FIB4 SCORE: 0.51

## 2021-04-16 NOTE — PROGRESS NOTES
SUBJECTIVE:    Chief Complaint   Patient presents with   • Itching     x 6 days both hands and feet, intermittent, OTC allergy medicine helping a little, has not tried any topical tx       HPI:     Nubia Arce is a 40 y.o. female with hypothyroidism, psoriatic arthritis, seasonal depression, anxiety, pineal gland cyst, meningioma, MTHFR gene mutation and reflux here for symptoms of itching which she has noticed for the past week.    Itching symptoms-last sat morning, most of body was itching.   Palm/feet/ankles. Exterior vaginal region.   Sunday worse, pharmacy- benadryl, claritin generic.   Feels helping with Claritin but at 24 hours seems to be coming back.   Some tingling in feet/hand.   Psoriasis on scalp.   Gluten on Friday, kombucha x2-wondering if associated.  Areas felt red and hot.    Notes histamine sensitivity-noticed herself with too many fermented foods  No food sensitivity testing.  Not seen allergist in the past.  More fatigue than usual.   Pedro Luis & Pedro Luis vaccine March 9th, one month later noticed symptoms as above.  Because her cousin had the Pedro Luis & Pedro Luis vaccine and had a rash.  Patient has not noticed a rash.  States her hands and feet tend to be colder and she wears slippers.    Has noticed a left posterior lymph node without significant tenderness.  She does get psoriasis on the back of her scalp.    Functional medicine practitioner, states will be changing.   Had panel done for blood work and is awaiting results.     Urgency/frequency- UA negative.  No dysuria or hematuria.  Incompletely emptying and hesitancy noted.   Has been monitoring her glucose- .  A1c was slightly elevated in the past.    ROS:  Denies any recent fevers or chills. No nausea or vomiting. No diarrhea. No chest pains or shortness of breath. No lower extremity edema.    Current Outpatient Medications on File Prior to Visit   Medication Sig Dispense Refill   • Ibuprofen (ADVIL) 200 MG Cap Take  by  mouth 2 times a day.     • D-MANNOSE PO Take  by mouth.     • CRANBERRY PO Take  by mouth.     • Multiple Vitamins-Minerals (ZINC PO) Take  by mouth.     • acyclovir (ZOVIRAX) 400 MG tablet Take 1 Tab by mouth 3 times a day. Take at first sign of symptoms for 5 days. 15 Tab 2   • sertraline (ZOLOFT) 25 MG tablet Take 1.5 Tabs by mouth every day. 135 Tab 0   • clobetasol (OLUX) 0.05 % foam Apply once daily to affected area of scalp as needed until scalp is smooth 50 g 4   • naproxen (NAPROSYN) 500 MG Tab Take 1 Tab by mouth 2 times daily with meals as needed. 30 Tab 0   • Ascorbic Acid (VITAMIN C) 1000 MG Tab Take  by mouth.     • Omega-3 Fatty Acids (FISH OIL) 1000 MG Cap capsule Take 1,000 mg by mouth 3 times a day, with meals.     • cyanocobalamin (VITAMIN B-12) 500 MCG Tab Take 500 mcg by mouth every day.     • vitamin D (CHOLECALCIFEROL) 1000 UNIT Tab Take 6,000 Units by mouth every day.     • IRON PO Take  by mouth.     • Turmeric 450 MG Cap Take  by mouth.       No current facility-administered medications on file prior to visit.       Allergies   Allergen Reactions   • Doxycycline Vomiting     2016        Patient Active Problem List    Diagnosis Date Noted   • Tension-type headache, not intractable 03/26/2020   • H. pylori infection 03/26/2020   • Dense breast tissue 07/12/2019   • MTHFR gene mutation ([facility])    • Breast tenderness in female 01/17/2019   • Acquired hypothyroidism 11/07/2018   • Iron deficiency anemia due to chronic blood loss 07/27/2018   • Dysesthesia 05/25/2018   • Meningioma ([facility]) 05/25/2018   • Lymphadenopathy of head and neck 02/01/2018   • Abnormal brain MRI 01/23/2018   • Pineal gland cyst 01/23/2018   • Dizziness 12/29/2017   • Seasonal depression ([facility]) 12/29/2017   • Numbness and tingling of left side of face 09/05/2017   • Decreased sensation of lower extremity 06/30/2017   • Gastroesophageal reflux disease 06/30/2017   • Neck pain 03/13/2017   • Psoriatic arthritis ([facility]) 02/09/2017  "  • Chronic LUQ pain 02/09/2017       Past Medical History:   Diagnosis Date   • Adenomyosis     aug 2018   • Allergy    • Anxiety    • Depression    • Generalized hypermobility of joints    • Genital HSV    • HLA B27 (HLA B27 positive)    • Meningioma ([facility])     Jan 2017   • MTHFR gene mutation ([facility])    • 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   • Psoriatic arthritis ([facility])        OBJECTIVE:   /82   Pulse 78   Temp 36.9 °C (98.4 °F) (Temporal)   Resp 16   Ht 1.626 m (5' 4\")   Wt 68.5 kg (151 lb)   LMP 04/02/2021   SpO2 100%   BMI 25.92 kg/m²   General: Well-developed well-nourished female, no acute distress  Neck: supple, left posterior superior neck region with enlarged lymph node- currently does not appear significantly tender to palpation, no thyromegaly  Cardiovascular: regular rate and rhythm, no murmurs, gallops, rubs  Lungs: clear to auscultation bilaterally, no wheezes, crackles, or rhonchi  Abdomen: soft, nontender, nondistended, no rebound, no guarding, no hepatosplenomegaly  Extremities: no cyanosis, clubbing, edema  Skin: Warm and dry.  Sparsely located small erythematous papular lesions on posterior neck.  Psych: appropriate mood and affect      ASSESSMENT/PLAN:    40 y.o.female with hypothyroidism, psoriatic arthritis, seasonal depression, anxiety, pineal gland cyst, meningioma, MTHFR gene mutation and reflux.    1. Itching  REFERRAL TO ALLERGY   2. Enlarged lymph node in neck     3. Psoriasis     4. Urinary symptom or sign  US-RENAL   5. Urinary hesitancy     6. Incomplete emptying of bladder     -Itching-more so of palms and feet noted.  Associated with allergy type reaction.  Possible sensitivity to histamine.  Currently appears controlled on Claritin.    She may continue on Claritin or Zyrtec.  May give trial of H2 blocker as needed.  Modify diet.  Will refer to allergy for further testing and evaluation.  -Enlarge lymph node of left posterior region-appears likely reactive and associated with " psoriasis.  Will monitor at this time.  Patient to adequately treat psoriasis with topical therapy.  -Urinary symptoms/signs-UA negative.  May be associated with overactive bladder.  Does feel incomplete emptying.  Will assess for postvoid residual.  Await lab results from her functional medicine provider.    Follow-up in 3 to 4 weeks.      This medical record contains text that has been entered with the assistance of computer voice recognition and dictation software.  Therefore, it may contain unintended errors in text, spelling, punctuation, or grammar.

## 2021-04-21 ENCOUNTER — HOSPITAL ENCOUNTER (OUTPATIENT)
Dept: RADIOLOGY | Facility: MEDICAL CENTER | Age: 41
End: 2021-04-21
Attending: FAMILY MEDICINE
Payer: COMMERCIAL

## 2021-04-21 DIAGNOSIS — N80.03 ADENOMYOSIS: ICD-10-CM

## 2021-04-21 DIAGNOSIS — N93.9 VAGINAL SPOTTING: ICD-10-CM

## 2021-04-21 PROCEDURE — 76830 TRANSVAGINAL US NON-OB: CPT

## 2021-05-03 ENCOUNTER — APPOINTMENT (OUTPATIENT)
Dept: RADIOLOGY | Facility: MEDICAL CENTER | Age: 41
End: 2021-05-03
Attending: FAMILY MEDICINE
Payer: COMMERCIAL

## 2021-06-04 ENCOUNTER — APPOINTMENT (OUTPATIENT)
Dept: RADIOLOGY | Facility: MEDICAL CENTER | Age: 41
End: 2021-06-04
Attending: FAMILY MEDICINE
Payer: COMMERCIAL

## 2021-06-10 ENCOUNTER — HOSPITAL ENCOUNTER (OUTPATIENT)
Dept: RADIOLOGY | Facility: MEDICAL CENTER | Age: 41
End: 2021-06-10
Attending: FAMILY MEDICINE
Payer: COMMERCIAL

## 2021-06-10 ENCOUNTER — HOSPITAL ENCOUNTER (OUTPATIENT)
Dept: RADIOLOGY | Facility: MEDICAL CENTER | Age: 41
End: 2021-06-10
Attending: OBSTETRICS & GYNECOLOGY
Payer: COMMERCIAL

## 2021-06-10 DIAGNOSIS — R39.9 URINARY SYMPTOM OR SIGN: ICD-10-CM

## 2021-06-10 DIAGNOSIS — N83.292 OTHER OVARIAN CYST, LEFT SIDE: ICD-10-CM

## 2021-06-10 PROCEDURE — 76775 US EXAM ABDO BACK WALL LIM: CPT

## 2021-06-10 PROCEDURE — 76830 TRANSVAGINAL US NON-OB: CPT

## 2021-06-15 DIAGNOSIS — R33.9 INCOMPLETE BLADDER EMPTYING: ICD-10-CM

## 2021-06-25 ENCOUNTER — HOSPITAL ENCOUNTER (OUTPATIENT)
Dept: RADIOLOGY | Facility: MEDICAL CENTER | Age: 41
End: 2021-06-25
Attending: FAMILY MEDICINE
Payer: COMMERCIAL

## 2021-06-25 DIAGNOSIS — Z12.31 ENCOUNTER FOR SCREENING MAMMOGRAM FOR MALIGNANT NEOPLASM OF BREAST: ICD-10-CM

## 2021-06-25 PROCEDURE — 77063 BREAST TOMOSYNTHESIS BI: CPT

## 2021-06-29 ENCOUNTER — HOSPITAL ENCOUNTER (OUTPATIENT)
Dept: RADIOLOGY | Facility: MEDICAL CENTER | Age: 41
End: 2021-06-29
Payer: COMMERCIAL

## 2021-07-01 ENCOUNTER — HOSPITAL ENCOUNTER (OUTPATIENT)
Dept: LAB | Facility: MEDICAL CENTER | Age: 41
End: 2021-07-01
Attending: NURSE PRACTITIONER
Payer: COMMERCIAL

## 2021-07-01 LAB
APPEARANCE UR: CLEAR
BILIRUB UR QL STRIP.AUTO: NEGATIVE
COLOR UR: YELLOW
ERYTHROCYTE [SEDIMENTATION RATE] IN BLOOD BY WESTERGREN METHOD: 5 MM/HOUR (ref 0–25)
GLUCOSE UR STRIP.AUTO-MCNC: NEGATIVE MG/DL
KETONES UR STRIP.AUTO-MCNC: NEGATIVE MG/DL
LEUKOCYTE ESTERASE UR QL STRIP.AUTO: NEGATIVE
MICRO URNS: NORMAL
NITRITE UR QL STRIP.AUTO: NEGATIVE
PH UR STRIP.AUTO: 6.5 [PH] (ref 5–8)
PROT UR QL STRIP: NEGATIVE MG/DL
RBC UR QL AUTO: NEGATIVE
SP GR UR STRIP.AUTO: 1
UROBILINOGEN UR STRIP.AUTO-MCNC: 0.2 MG/DL

## 2021-07-01 PROCEDURE — 82785 ASSAY OF IGE: CPT

## 2021-07-01 PROCEDURE — 83516 IMMUNOASSAY NONANTIBODY: CPT

## 2021-07-01 PROCEDURE — 36415 COLL VENOUS BLD VENIPUNCTURE: CPT

## 2021-07-01 PROCEDURE — 86038 ANTINUCLEAR ANTIBODIES: CPT

## 2021-07-01 PROCEDURE — 85652 RBC SED RATE AUTOMATED: CPT

## 2021-07-01 PROCEDURE — 82784 ASSAY IGA/IGD/IGG/IGM EACH: CPT

## 2021-07-01 PROCEDURE — 81003 URINALYSIS AUTO W/O SCOPE: CPT

## 2021-07-03 LAB
IGA SERPL-MCNC: 114 MG/DL (ref 68–408)
NUCLEAR IGG SER QL IA: NORMAL
TTG IGA SER IA-ACNC: <2 U/ML (ref 0–3)

## 2021-07-07 LAB — IGE SERPL-ACNC: <2 KU/L

## 2021-08-05 ENCOUNTER — OFFICE VISIT (OUTPATIENT)
Dept: MEDICAL GROUP | Facility: IMAGING CENTER | Age: 41
End: 2021-08-05
Payer: COMMERCIAL

## 2021-08-05 VITALS
HEART RATE: 86 BPM | OXYGEN SATURATION: 100 % | WEIGHT: 156 LBS | TEMPERATURE: 98.6 F | DIASTOLIC BLOOD PRESSURE: 72 MMHG | BODY MASS INDEX: 26.63 KG/M2 | HEIGHT: 64 IN | RESPIRATION RATE: 12 BRPM | SYSTOLIC BLOOD PRESSURE: 112 MMHG

## 2021-08-05 DIAGNOSIS — D32.9 MENINGIOMA (HCC): ICD-10-CM

## 2021-08-05 DIAGNOSIS — L29.9 ITCHING: ICD-10-CM

## 2021-08-05 DIAGNOSIS — M54.2 NECK PAIN: ICD-10-CM

## 2021-08-05 DIAGNOSIS — R20.2 PARESTHESIA OF RIGHT UPPER EXTREMITY: ICD-10-CM

## 2021-08-05 DIAGNOSIS — R92.30 DENSE BREAST TISSUE: ICD-10-CM

## 2021-08-05 DIAGNOSIS — M54.9 UPPER BACK PAIN ON RIGHT SIDE: ICD-10-CM

## 2021-08-05 DIAGNOSIS — R33.9 INCOMPLETE BLADDER EMPTYING: ICD-10-CM

## 2021-08-05 DIAGNOSIS — R63.5 WEIGHT GAIN: ICD-10-CM

## 2021-08-05 DIAGNOSIS — Z88.9 MULTIPLE ALLERGIES: ICD-10-CM

## 2021-08-05 PROCEDURE — 99214 OFFICE O/P EST MOD 30 MIN: CPT | Performed by: FAMILY MEDICINE

## 2021-08-05 RX ORDER — POLYMYXIN B SULFATE AND TRIMETHOPRIM 1; 10000 MG/ML; [USP'U]/ML
SOLUTION OPHTHALMIC
COMMUNITY
Start: 2021-08-03 | End: 2021-12-01

## 2021-08-05 RX ORDER — CETIRIZINE HYDROCHLORIDE 10 MG/1
10 TABLET ORAL DAILY
COMMUNITY
End: 2021-12-01

## 2021-08-05 ASSESSMENT — FIBROSIS 4 INDEX: FIB4 SCORE: 0.51

## 2021-08-05 ASSESSMENT — PAIN SCALES - GENERAL: PAINLEVEL: 4=SLIGHT-MODERATE PAIN

## 2021-08-05 NOTE — PROGRESS NOTES
SUBJECTIVE:    Chief Complaint   Patient presents with   • Seasonal Allergies     itching    • Extremity Weakness     right arm x 2 week, pain in shoulder        HPI:     Nubia Arce is a 40 y.o. female with hypothyroidism, psoriatic arthritis, seasonal depression, anxiety, pineal gland cyst, meningioma, MTHFR gene mutation and reflux here for review of medical issues and also complains of right arm and posterior shoulder pain symptoms.    Patient with symptoms of itching-was taking Zyrtec daily but has been able to decrease it to once every 4 days with changes in her diet.  States in the past 33 days or so she has overhauled her nutrition.  She is on her autoimmune protocol again.  He did have allergy testing with [facility].  Noted some environmental allergens and also to beef.  Does not have any sinus issues or nasal drainage.  Starting to see a functional medicine person in the [place] area but is now currently seeing Ellie Isabel in functional medicine, who suspects some high histamine reactions.  To the [place] test-urine test for hormones in her urine and notes her bad estrogen was high.  She is currently on estrodim for the past month or so but will be planning to be on it for next 3 to 4 months.  [facility] as she was having some heavy periods as well.  Is been advised to stay off of beef and Advil.    She also notes from April to June she has gained about 10 pounds.  Notes this occurred a few weeks after having been on vacation.  Has not noticed any decrease in weight with her recent dietary changes.  States she feels somewhat thick and bloated.  She is walking more now.  Has joined a yoga program.  Has an elliptical plans to use.    She does have some current right posterior shoulder pain and feels some right arm symptoms with weakness fourth and fifth fingers.  Notice for past 2 weeks.  Sometimes feel her coordination is off.   Noticed some mild tingling numbness.  Has had tennis elbow in the past.  Has been on the computer more frequently lately.  She does have some neck issues.  Has difficulty with her pillow at nighttime.    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  Concerned about her energy levels.    Dense breast tissue on mammogram.  Addendum to prior mammogram was reviewed, comparison made to prior mammograms. she did complete her screening ultrasound as well.    Patient did have previous imaging with ultrasound which noted increased post void residual.  She was referred to urology.    Meningioma-states she does need a MRI in October.  Usually will have done with [facility].  She will let us know if she needs further assistance with this in the future.     ROS:  No recent fevers or chills. No nausea or vomiting. No diarrhea. No chest pains or shortness of breath. No lower extremity edema.    Current Outpatient Medications on File Prior to Visit   Medication Sig Dispense Refill   • cetirizine (ZYRTEC) 10 MG Tab Take 10 mg by mouth every day.     • GLUTATHIONE PO Take 200 mg by mouth.     • MISC NATURAL PRODUCTS PO Take  by mouth. CDG-estrodim     • sertraline (ZOLOFT) 25 MG tablet TAKE 1 AND 1/2 TABLETS BY MOUTH EVERY  tablet 1   • Ibuprofen (ADVIL) 200 MG Cap Take  by mouth 2 times a day.     • CRANBERRY PO Take  by mouth.     • acyclovir (ZOVIRAX) 400 MG tablet Take 1 Tab by mouth 3 times a day. Take at first sign of symptoms for 5 days. 15 Tab 2   • clobetasol (OLUX) 0.05 % foam Apply once daily to affected area of scalp as needed until scalp is smooth 50 g 4   • naproxen (NAPROSYN) 500 MG Tab Take 1 Tab by mouth 2 times daily with meals as needed. 30 Tab 0   • IRON PO Take 2 Tablets by mouth. Chelated ferrous iron     • Ascorbic Acid (VITAMIN C) 1000 MG Tab Take  by mouth.     • Turmeric 450 MG Cap Take  by mouth.     • Omega-3 Fatty Acids (FISH OIL) 1000 MG Cap capsule Take 1,000 mg by  "mouth 3 times a day, with meals.     • cyanocobalamin (VITAMIN B-12) 500 MCG Tab Take 500 mcg by mouth every day.     • vitamin D (CHOLECALCIFEROL) 1000 UNIT Tab Take 6,000 Units by mouth every day.     • polymixin-trimethoprim (POLYTRIM) 51044-9.1 UNIT/ML-% Solution        No current facility-administered medications on file prior to visit.       Allergies   Allergen Reactions   • Doxycycline Vomiting     2016        Patient Active Problem List    Diagnosis Date Noted   • Itching 04/16/2021   • Urinary symptom or sign 04/16/2021   • Tension-type headache, not intractable 03/26/2020   • H. pylori infection 03/26/2020   • Dense breast tissue 07/12/2019   • MTHFR gene mutation    • Breast tenderness in female 01/17/2019   • Acquired hypothyroidism 11/07/2018   • Iron deficiency anemia due to chronic blood loss 07/27/2018   • Dysesthesia 05/25/2018   • Meningioma ([facility]) 05/25/2018   • Lymphadenopathy of head and neck 02/01/2018   • Abnormal brain MRI 01/23/2018   • Pineal gland cyst 01/23/2018   • Dizziness 12/29/2017   • Seasonal depression ([facility]) 12/29/2017   • Numbness and tingling of left side of face 09/05/2017   • Decreased sensation of lower extremity 06/30/2017   • Gastroesophageal reflux disease 06/30/2017   • Neck pain 03/13/2017   • Psoriasis 02/09/2017   • Chronic LUQ pain 02/09/2017       Past Medical History:   Diagnosis Date   • Adenomyosis     aug 2018   • Allergy    • Anxiety    • Depression    • Generalized hypermobility of joints    • Genital HSV    • HLA B27 (HLA B27 positive)    • Meningioma ([facility])     Jan 2017   • MTHFR gene mutation    • 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   • Psoriatic arthritis ([facility])          OBJECTIVE:   /72   Pulse 86   Temp 37 °C (98.6 °F)   Resp 12   Ht 1.626 m (5' 4\")   Wt 70.8 kg (156 lb)   LMP 07/28/2021 (Exact Date)   SpO2 100%   BMI 26.78 kg/m²   General: Well-developed well-nourished female, no acute distress  Neck: supple, no lymphadenopathy- cervical or " supraclavicular, no thyromegaly.  No spinal tenderness to palpation.  Full range of motion.  Slight paraspinal enters palpation on right side. +/-Spurling's on right.  Cardiovascular: regular rate and rhythm, no murmurs, gallops, rubs  Lungs: clear to auscultation bilaterally, no wheezes, crackles, or rhonchi  Abdomen: +bowel sounds, soft, nontender, nondistended, no rebound, no guarding, no hepatosplenomegaly  Extremities: no cyanosis, clubbing, edema.  2 + DTR bilaterally throughout.  Sensation intact in upper extremities.  5/5 strength throughout upper extremities,normal  strength, adduction and abduction of fingers.  Negative elbow flexion test on right.  Skin: Warm and dry  Psych: appropriate mood and affect    ASSESSMENT/PLAN:    40 y.o.female with hypothyroidism, psoriatic arthritis, seasonal depression, anxiety, pineal gland cyst, meningioma, MTHFR gene mutation and reflux.       1. Itching     2. Multiple allergies     3. Weight gain     4. Dense breast tissue     5. Neck pain     6. Upper back pain on right side     7. Paresthesia of right upper extremity     8. Incomplete bladder emptying     9. Meningioma ([facility])     -Itching-appears component related to allergies.  She will continue to work with the functional medicine provider.  She will continue modified diet.  Continue on Zyrtec as needed.  -Weight gain-may be multifactorial.  Continue on routine exercise and healthy diet.  Continue to monitor at this time.  Consider recheck thyroid levels in future as needed.  -Dense breast tissue-recommend routine mammogram screening with additional screening ultrasound in future.   Reviewed prior imaging studies and addendum reviewed with patient.  Routine annual imaging was recommended.  -Pain on right upper back/shoulder region/chronic neck issues-neck xray done in past. May be further contributing to right upper extremity symptoms.  May have some component of cubital tunnel syndrome, other inflammatory  process, or cervical etiology.  Recommend modify activities.  Recommend routine stretching and strengthening exercises.  Recommend appropriate ergonomics.  Patient given recommendations for exercises.  Sitter acupuncture therapy.  Follow-up if no further improvements in 6-8 weeks.  -Incomplete emptying of bladder-patient had abnormal postvoid residual.  Has been referred to urology.  -Meningioma-patient will be due for MRI imaging in October.  She will follow up with [facility].    Return in about 2 months (around 10/5/2021).    This medical record contains text that has been entered with the assistance of computer voice recognition and dictation software.  Therefore, it may contain unintended errors in text, spelling, punctuation, or grammar.

## 2021-12-01 ENCOUNTER — OFFICE VISIT (OUTPATIENT)
Dept: MEDICAL GROUP | Facility: IMAGING CENTER | Age: 41
End: 2021-12-01
Payer: COMMERCIAL

## 2021-12-01 VITALS
WEIGHT: 154 LBS | BODY MASS INDEX: 26.29 KG/M2 | RESPIRATION RATE: 12 BRPM | HEIGHT: 64 IN | OXYGEN SATURATION: 100 % | TEMPERATURE: 98.8 F | SYSTOLIC BLOOD PRESSURE: 110 MMHG | HEART RATE: 90 BPM | DIASTOLIC BLOOD PRESSURE: 70 MMHG

## 2021-12-01 DIAGNOSIS — D32.0 MENINGIOMA OF CEREBELLUM (HCC): ICD-10-CM

## 2021-12-01 DIAGNOSIS — L40.9 PSORIASIS: ICD-10-CM

## 2021-12-01 DIAGNOSIS — M25.551 RIGHT HIP PAIN: ICD-10-CM

## 2021-12-01 DIAGNOSIS — N64.4 BREAST TENDERNESS IN FEMALE: ICD-10-CM

## 2021-12-01 DIAGNOSIS — R92.30 DENSE BREAST TISSUE: ICD-10-CM

## 2021-12-01 DIAGNOSIS — E34.8 PINEAL GLAND CYST: ICD-10-CM

## 2021-12-01 DIAGNOSIS — M51.24 PROTRUSION OF INTERVERTEBRAL DISC OF THORACIC REGION: ICD-10-CM

## 2021-12-01 DIAGNOSIS — R10.9 ABDOMINAL PAIN, UNSPECIFIED ABDOMINAL LOCATION: ICD-10-CM

## 2021-12-01 PROCEDURE — 99214 OFFICE O/P EST MOD 30 MIN: CPT | Performed by: FAMILY MEDICINE

## 2021-12-01 RX ORDER — CLOBETASOL PROPIONATE 0.5 MG/G
AEROSOL, FOAM TOPICAL
Qty: 50 G | Refills: 4 | Status: SHIPPED | OUTPATIENT
Start: 2021-12-01 | End: 2023-08-08

## 2021-12-01 ASSESSMENT — FIBROSIS 4 INDEX: FIB4 SCORE: 0.52

## 2021-12-01 ASSESSMENT — PAIN SCALES - GENERAL: PAINLEVEL: 4=SLIGHT-MODERATE PAIN

## 2021-12-01 NOTE — PROGRESS NOTES
SUBJECTIVE:    Chief Complaint   Patient presents with   • Breast Problem     tenderness (possibly from throwing ball)   • Results     brain mri,    • Hip Pain     right side, requesting xray    • Abdominal Pain     tugging sensation during ovulation. still occuring       HPI:     Nubia Arce is a 41 y.o. female with hypothyroidism, psoriatic arthritis, seasonal depression, anxiety, pineal gland cyst, meningioma, MTHFR gene mutation and reflux here for review of right side breast tenderness, right hip pain and intermittent abdominal tugging pain and MRI results.    Right breast tenderness- throwing ball more. Her naturopathic provider felt area, lumpy. Improving currently.  Dense breast tissue. 2021 u/s negative.  Mammogram completed 2021.  Tends to have dense breast tissue, but no other nodules, skin changes or nipple discharge noted.  Has costochondral area on left side under breast area.    Hip pain on right- notices with yoga.  Right anterior region.  No recent injury.  Radiating pain. April rheumatology appointment, saw once prior.   Did not complete prior xray orders. Has not received call back.   Right front and deep, crossed legged or hip extension is painful, if lies on it too long at night.     Clobetasol-needs refill medication.  Uses for scalp-psoriasis, help with itching. Dermatologist left area.    Brain MRI patiently completed for meningioma.  She does have a scheduled appointment with [facility] physician  appointment.     Abdominal pain-intermittent, seems with ovulation, left side tugging. Changes sides with ovulation. Gynecology-from prior evaluation, ovaries with cyst.   Feel tugging down on left lower check. Bending may bother her.   History of .  2018- thoracic spine MRI. T7-8, small disc protrusion.       ROS:  No recent fevers or chills. No nausea or vomiting. No diarrhea. No chest pains or shortness of breath. No lower extremity edema.    Current Outpatient  Medications on File Prior to Visit   Medication Sig Dispense Refill   • D-MANNOSE PO Take  by mouth.     • sertraline (ZOLOFT) 25 MG tablet TAKE 1 AND 1/2 TABLETS BY MOUTH EVERY  Tablet 1   • acyclovir (ZOVIRAX) 400 MG tablet Take 1 Tablet by mouth 3 times a day. Take at first sign of symptoms for 5 days. 15 Tablet 2   • GLUTATHIONE PO Take 200 mg by mouth.     • MISC NATURAL PRODUCTS PO Take  by mouth. CDG-estrodim     • Ibuprofen (ADVIL) 200 MG Cap Take  by mouth 2 times a day.     • CRANBERRY PO Take  by mouth.     • clobetasol (OLUX) 0.05 % foam Apply once daily to affected area of scalp as needed until scalp is smooth 50 g 4   • naproxen (NAPROSYN) 500 MG Tab Take 1 Tab by mouth 2 times daily with meals as needed. 30 Tab 0   • IRON PO Take 2 Tablets by mouth. Chelated ferrous iron     • Ascorbic Acid (VITAMIN C) 1000 MG Tab Take  by mouth.     • Turmeric 450 MG Cap Take  by mouth.     • Omega-3 Fatty Acids (FISH OIL) 1000 MG Cap capsule Take 1,000 mg by mouth 3 times a day, with meals.     • cyanocobalamin (VITAMIN B-12) 500 MCG Tab Take 500 mcg by mouth every day.       No current facility-administered medications on file prior to visit.       Allergies   Allergen Reactions   • Doxycycline Vomiting     2016        Patient Active Problem List    Diagnosis Date Noted   • Itching 04/16/2021   • Urinary symptom or sign 04/16/2021   • Tension-type headache, not intractable 03/26/2020   • H. pylori infection 03/26/2020   • Dense breast tissue 07/12/2019   • MTHFR gene mutation    • Breast tenderness in female 01/17/2019   • Acquired hypothyroidism 11/07/2018   • Iron deficiency anemia due to chronic blood loss 07/27/2018   • Dysesthesia 05/25/2018   • Meningioma (HCC) 05/25/2018   • Lymphadenopathy of head and neck 02/01/2018   • Abnormal brain MRI 01/23/2018   • Pineal gland cyst 01/23/2018   • Dizziness 12/29/2017   • Seasonal depression (HCC) 12/29/2017   • Numbness and tingling of left side of face  "09/05/2017   • Decreased sensation of lower extremity 06/30/2017   • Gastroesophageal reflux disease 06/30/2017   • Neck pain 03/13/2017   • Psoriasis 02/09/2017   • Chronic LUQ pain 02/09/2017       Past Medical History:   Diagnosis Date   • Adenomyosis     aug 2018   • Allergy    • Anxiety    • Depression    • Generalized hypermobility of joints    • Genital HSV    • HLA B27 (HLA B27 positive)    • Meningioma (HCC)     Jan 2017   • MTHFR gene mutation    • 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   • Psoriatic arthritis (HCC)          OBJECTIVE:   /70   Pulse 90   Temp 37.1 °C (98.8 °F)   Resp 12   Ht 1.626 m (5' 4\")   Wt 69.9 kg (154 lb)   SpO2 100%   BMI 26.43 kg/m²   General: Well-developed well-nourished female, no acute distress  Neck: supple, no lymphadenopathy- cervical or supraclavicular, no thyromegaly  Cardiovascular: regular rate and rhythm, no murmurs, gallops, rubs  Lungs: clear to auscultation bilaterally, no wheezes, crackles, or rhonchi  Abdomen: +bowel sounds, soft, nontender, nondistended, no rebound, no guarding, no hepatosplenomegaly.  Left costochondral region with mild TTP.  Back: No spinal tenderness, right side paraspinal thoracic region with tightness of muscles.  Extremities: no cyanosis, clubbing, edema.   Right hip with full range of motion.  Positive impingement and discomfort slightly with internal rotation.  Normal strength.  No pain with axial load.  Skin: Warm and dry  Psych: appropriate mood and affect  BREAST EXAM: Palpable dense breast tissue bilaterally.  Right side lateral breast region with tenderness to palpation.  Inspection negative otherwise.  No nipple discharge or bleeding. No masses or nodularity palpable. No axillary ALLEY.     Brain MRI: 12/1/2021-seen yet.  Stable right side cerebellar tentorial meningioma.  Benign subcentimeter pineal cyst.    ASSESSMENT/PLAN:    41 y.o.female with hypothyroidism, psoriatic arthritis, seasonal depression, anxiety, pineal gland cyst, " meningioma, MTHFR gene mutation and reflux.    1. Breast tenderness in female  MA-DIAGNOSTIC MAMMO BILAT W/TOMOSYNTHESIS W/CAD   2. Dense breast tissue  MA-DIAGNOSTIC MAMMO BILAT W/TOMOSYNTHESIS W/CAD   3. Right hip pain  DX-HIP-UNILATERAL-WITH PELVIS-1 VIEW RIGHT   4. Psoriasis  clobetasol (OLUX) 0.05 % foam   5. Meningioma of cerebellum (HCC)     6. Pineal gland cyst     7. Abdominal pain, unspecified abdominal location     8. Protrusion of intervertebral disc of thoracic region     -Breast tenderness in female on right side.  Has had prior screening mammogram and ultrasound completed about 6 months ago.  Recommend complete diagnostic mammogram.  -Dense breast tissue-as above.  -Right-sided hip pain-we will evaluate further with x-ray.  Some tightness of right hip flexor tendons.  Recommend routine stretching and strengthening.  -Psoriasis-stable.  Medication as needed.  Reviewed potential side effects of medication.  -Meningioma of cerebellum-pineal gland cyst.  Appears overall stable on recent MRI.  Patient will follow up with [facility] provider as scheduled.  -Abdominal pain-intermittent sensation of tugging which appears associated with ovulation.  Unclear etiology.  Noted to have thoracic disc protrusion, history of , and history of ovarian cysts which may contribute to symptoms.  Recommend consider follow-up with gynecology.  Discussed consideration of seeing physiatrist in future as needed.    Return in about 3 weeks (around 2021).    This medical record contains text that has been entered with the assistance of computer voice recognition and dictation software.  Therefore, it may contain unintended errors in text, spelling, punctuation, or grammar.

## 2021-12-16 ENCOUNTER — HOSPITAL ENCOUNTER (OUTPATIENT)
Dept: RADIOLOGY | Facility: MEDICAL CENTER | Age: 41
End: 2021-12-16
Attending: FAMILY MEDICINE
Payer: COMMERCIAL

## 2021-12-16 DIAGNOSIS — M25.551 RIGHT HIP PAIN: ICD-10-CM

## 2021-12-16 PROCEDURE — 73502 X-RAY EXAM HIP UNI 2-3 VIEWS: CPT | Mod: RT

## 2021-12-21 ENCOUNTER — HOSPITAL ENCOUNTER (OUTPATIENT)
Dept: RADIOLOGY | Facility: MEDICAL CENTER | Age: 41
End: 2021-12-21
Attending: FAMILY MEDICINE
Payer: COMMERCIAL

## 2021-12-21 DIAGNOSIS — R92.30 DENSE BREAST TISSUE: ICD-10-CM

## 2021-12-21 DIAGNOSIS — N64.4 BREAST TENDERNESS IN FEMALE: ICD-10-CM

## 2021-12-21 PROCEDURE — 76642 ULTRASOUND BREAST LIMITED: CPT | Mod: RT

## 2021-12-21 PROCEDURE — G0279 TOMOSYNTHESIS, MAMMO: HCPCS | Mod: RT

## 2021-12-22 ENCOUNTER — HOSPITAL ENCOUNTER (OUTPATIENT)
Dept: RADIOLOGY | Facility: MEDICAL CENTER | Age: 41
End: 2021-12-22
Attending: FAMILY MEDICINE
Payer: COMMERCIAL

## 2021-12-22 DIAGNOSIS — R92.8 ABNORMAL FINDING ON BREAST IMAGING: ICD-10-CM

## 2021-12-22 LAB — PATHOLOGY CONSULT NOTE: NORMAL

## 2021-12-22 PROCEDURE — 19083 BX BREAST 1ST LESION US IMAG: CPT | Mod: RT

## 2021-12-22 PROCEDURE — 88305 TISSUE EXAM BY PATHOLOGIST: CPT

## 2021-12-23 ENCOUNTER — TELEPHONE (OUTPATIENT)
Dept: RADIOLOGY | Facility: MEDICAL CENTER | Age: 41
End: 2021-12-23

## 2022-02-02 ENCOUNTER — OFFICE VISIT (OUTPATIENT)
Dept: MEDICAL GROUP | Facility: IMAGING CENTER | Age: 42
End: 2022-02-02
Payer: COMMERCIAL

## 2022-02-02 VITALS
DIASTOLIC BLOOD PRESSURE: 72 MMHG | WEIGHT: 156 LBS | SYSTOLIC BLOOD PRESSURE: 120 MMHG | HEIGHT: 64 IN | TEMPERATURE: 99.3 F | RESPIRATION RATE: 12 BRPM | OXYGEN SATURATION: 100 % | HEART RATE: 90 BPM | BODY MASS INDEX: 26.63 KG/M2

## 2022-02-02 DIAGNOSIS — D24.1 FIBROADENOMA OF RIGHT BREAST: ICD-10-CM

## 2022-02-02 DIAGNOSIS — Z12.31 ENCOUNTER FOR SCREENING MAMMOGRAM FOR MALIGNANT NEOPLASM OF BREAST: ICD-10-CM

## 2022-02-02 DIAGNOSIS — M25.551 RIGHT HIP PAIN: ICD-10-CM

## 2022-02-02 DIAGNOSIS — R92.30 DENSE BREAST TISSUE: ICD-10-CM

## 2022-02-02 DIAGNOSIS — R63.5 WEIGHT GAIN: ICD-10-CM

## 2022-02-02 DIAGNOSIS — N64.4 BREAST TENDERNESS IN FEMALE: ICD-10-CM

## 2022-02-02 DIAGNOSIS — R73.09 ELEVATED HEMOGLOBIN A1C: ICD-10-CM

## 2022-02-02 DIAGNOSIS — N92.3 SPOTTING BETWEEN MENSES: ICD-10-CM

## 2022-02-02 DIAGNOSIS — M16.11 OSTEOARTHRITIS OF RIGHT HIP, UNSPECIFIED OSTEOARTHRITIS TYPE: ICD-10-CM

## 2022-02-02 DIAGNOSIS — Z13.220 SCREENING, LIPID: ICD-10-CM

## 2022-02-02 DIAGNOSIS — R79.0 LOW FERRITIN: ICD-10-CM

## 2022-02-02 PROCEDURE — 99214 OFFICE O/P EST MOD 30 MIN: CPT | Performed by: FAMILY MEDICINE

## 2022-02-02 ASSESSMENT — FIBROSIS 4 INDEX: FIB4 SCORE: 0.52

## 2022-02-02 ASSESSMENT — PAIN SCALES - GENERAL: PAINLEVEL: 3=SLIGHT PAIN

## 2022-02-02 ASSESSMENT — PATIENT HEALTH QUESTIONNAIRE - PHQ9: CLINICAL INTERPRETATION OF PHQ2 SCORE: 0
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
## 2022-02-07 ENCOUNTER — HOSPITAL ENCOUNTER (OUTPATIENT)
Dept: LAB | Facility: MEDICAL CENTER | Age: 42
End: 2022-02-07
Attending: FAMILY MEDICINE
Payer: COMMERCIAL

## 2022-02-07 DIAGNOSIS — R63.5 WEIGHT GAIN: ICD-10-CM

## 2022-02-07 DIAGNOSIS — R73.09 ELEVATED HEMOGLOBIN A1C: ICD-10-CM

## 2022-02-07 DIAGNOSIS — R79.0 LOW FERRITIN: ICD-10-CM

## 2022-02-07 DIAGNOSIS — Z13.220 SCREENING, LIPID: ICD-10-CM

## 2022-02-07 DIAGNOSIS — N92.3 SPOTTING BETWEEN MENSES: ICD-10-CM

## 2022-02-07 LAB
ALBUMIN SERPL BCP-MCNC: 4.5 G/DL (ref 3.2–4.9)
ALBUMIN/GLOB SERPL: 1.8 G/DL
ALP SERPL-CCNC: 36 U/L (ref 30–99)
ALT SERPL-CCNC: 16 U/L (ref 2–50)
ANION GAP SERPL CALC-SCNC: 11 MMOL/L (ref 7–16)
AST SERPL-CCNC: 16 U/L (ref 12–45)
BILIRUB SERPL-MCNC: 0.4 MG/DL (ref 0.1–1.5)
BUN SERPL-MCNC: 9 MG/DL (ref 8–22)
CALCIUM SERPL-MCNC: 9.1 MG/DL (ref 8.5–10.5)
CHLORIDE SERPL-SCNC: 105 MMOL/L (ref 96–112)
CHOLEST SERPL-MCNC: 168 MG/DL (ref 100–199)
CO2 SERPL-SCNC: 22 MMOL/L (ref 20–33)
CREAT SERPL-MCNC: 0.61 MG/DL (ref 0.5–1.4)
ERYTHROCYTE [DISTWIDTH] IN BLOOD BY AUTOMATED COUNT: 40.3 FL (ref 35.9–50)
EST. AVERAGE GLUCOSE BLD GHB EST-MCNC: 111 MG/DL
FASTING STATUS PATIENT QL REPORTED: NORMAL
FERRITIN SERPL-MCNC: 43.5 NG/ML (ref 10–291)
GLOBULIN SER CALC-MCNC: 2.5 G/DL (ref 1.9–3.5)
GLUCOSE SERPL-MCNC: 92 MG/DL (ref 65–99)
HBA1C MFR BLD: 5.5 % (ref 4–5.6)
HCT VFR BLD AUTO: 42.5 % (ref 37–47)
HDLC SERPL-MCNC: 48 MG/DL
HGB BLD-MCNC: 14.3 G/DL (ref 12–16)
IRON SATN MFR SERPL: 56 % (ref 15–55)
IRON SERPL-MCNC: 179 UG/DL (ref 40–170)
LDLC SERPL CALC-MCNC: 92 MG/DL
MCH RBC QN AUTO: 27.9 PG (ref 27–33)
MCHC RBC AUTO-ENTMCNC: 33.6 G/DL (ref 33.6–35)
MCV RBC AUTO: 83 FL (ref 81.4–97.8)
PLATELET # BLD AUTO: 261 K/UL (ref 164–446)
PMV BLD AUTO: 9.6 FL (ref 9–12.9)
POTASSIUM SERPL-SCNC: 4.3 MMOL/L (ref 3.6–5.5)
PROT SERPL-MCNC: 7 G/DL (ref 6–8.2)
RBC # BLD AUTO: 5.12 M/UL (ref 4.2–5.4)
SODIUM SERPL-SCNC: 138 MMOL/L (ref 135–145)
TIBC SERPL-MCNC: 318 UG/DL (ref 250–450)
TRIGL SERPL-MCNC: 138 MG/DL (ref 0–149)
TSH SERPL DL<=0.005 MIU/L-ACNC: 1.54 UIU/ML (ref 0.38–5.33)
UIBC SERPL-MCNC: 139 UG/DL (ref 110–370)
WBC # BLD AUTO: 4.2 K/UL (ref 4.8–10.8)

## 2022-02-07 PROCEDURE — 80061 LIPID PANEL: CPT

## 2022-02-07 PROCEDURE — 82728 ASSAY OF FERRITIN: CPT

## 2022-02-07 PROCEDURE — 83550 IRON BINDING TEST: CPT

## 2022-02-07 PROCEDURE — 83540 ASSAY OF IRON: CPT

## 2022-02-07 PROCEDURE — 80053 COMPREHEN METABOLIC PANEL: CPT

## 2022-02-07 PROCEDURE — 36415 COLL VENOUS BLD VENIPUNCTURE: CPT

## 2022-02-07 PROCEDURE — 83036 HEMOGLOBIN GLYCOSYLATED A1C: CPT

## 2022-02-07 PROCEDURE — 84443 ASSAY THYROID STIM HORMONE: CPT

## 2022-02-07 PROCEDURE — 85027 COMPLETE CBC AUTOMATED: CPT

## 2022-04-27 ENCOUNTER — HOSPITAL ENCOUNTER (OUTPATIENT)
Dept: RADIOLOGY | Facility: MEDICAL CENTER | Age: 42
End: 2022-04-27
Attending: OBSTETRICS & GYNECOLOGY
Payer: COMMERCIAL

## 2022-04-27 DIAGNOSIS — R10.2 ADNEXAL TENDERNESS, RIGHT: ICD-10-CM

## 2022-04-27 PROCEDURE — 76830 TRANSVAGINAL US NON-OB: CPT

## 2022-06-16 ENCOUNTER — HOSPITAL ENCOUNTER (OUTPATIENT)
Dept: RADIOLOGY | Facility: MEDICAL CENTER | Age: 42
End: 2022-06-16
Attending: INTERNAL MEDICINE
Payer: COMMERCIAL

## 2022-06-16 ENCOUNTER — OFFICE VISIT (OUTPATIENT)
Dept: DERMATOLOGY | Facility: IMAGING CENTER | Age: 42
End: 2022-06-16
Payer: COMMERCIAL

## 2022-06-16 DIAGNOSIS — Z12.83 SKIN CANCER SCREENING: ICD-10-CM

## 2022-06-16 DIAGNOSIS — M79.671 RIGHT FOOT PAIN: ICD-10-CM

## 2022-06-16 DIAGNOSIS — L85.8 KERATOSIS PILARIS: ICD-10-CM

## 2022-06-16 DIAGNOSIS — L40.9 PSORIASIS: ICD-10-CM

## 2022-06-16 DIAGNOSIS — M79.642 LEFT HAND PAIN: ICD-10-CM

## 2022-06-16 DIAGNOSIS — L90.8 SKIN AGING: ICD-10-CM

## 2022-06-16 DIAGNOSIS — M53.3 DISORDER OF SACRUM: ICD-10-CM

## 2022-06-16 DIAGNOSIS — M54.2 CERVICALGIA: ICD-10-CM

## 2022-06-16 DIAGNOSIS — L81.4 LENTIGO: ICD-10-CM

## 2022-06-16 DIAGNOSIS — D18.01 CHERRY ANGIOMA: ICD-10-CM

## 2022-06-16 DIAGNOSIS — D22.9 NEVUS: ICD-10-CM

## 2022-06-16 DIAGNOSIS — L70.0 ACNE VULGARIS: ICD-10-CM

## 2022-06-16 DIAGNOSIS — L85.3 XEROSIS OF SKIN: ICD-10-CM

## 2022-06-16 DIAGNOSIS — M54.50 LUMBAR BACK PAIN: ICD-10-CM

## 2022-06-16 DIAGNOSIS — M54.50 LOW BACK PAIN, UNSPECIFIED BACK PAIN LATERALITY, UNSPECIFIED CHRONICITY, UNSPECIFIED WHETHER SCIATICA PRESENT: ICD-10-CM

## 2022-06-16 DIAGNOSIS — D23.9 DERMATOFIBROMA: ICD-10-CM

## 2022-06-16 DIAGNOSIS — M79.641 RIGHT HAND PAIN: ICD-10-CM

## 2022-06-16 PROCEDURE — 72202 X-RAY EXAM SI JOINTS 3/> VWS: CPT

## 2022-06-16 PROCEDURE — 77077 JOINT SURVEY SINGLE VIEW: CPT

## 2022-06-16 PROCEDURE — 99214 OFFICE O/P EST MOD 30 MIN: CPT | Performed by: DERMATOLOGY

## 2022-06-16 PROCEDURE — 72100 X-RAY EXAM L-S SPINE 2/3 VWS: CPT

## 2022-06-16 PROCEDURE — 72040 X-RAY EXAM NECK SPINE 2-3 VW: CPT

## 2022-06-16 RX ORDER — NORETHINDRONE 0.35 MG/1
1 TABLET ORAL
COMMUNITY
Start: 2022-05-29 | End: 2023-12-07

## 2022-06-16 NOTE — PROGRESS NOTES
"CC: pso fv and antolin     Subjective:Previously seen patient here for PSo and ANTOLIN using clobetasol on scalp and helps and using dermarest shampoo, restricting dieting and cut out gluten seems to help.    HPI/location: face \"sun spots\"  Time present: within a year   Painful lesion: No  Itching lesion: No  Enlarging lesion: No  Anything make it better or worse?    And dry spots on lt eyelid uses facial moisturizer helps but still there.    Initial hx:  HPI: hx psoriasis dx 10 years ago + psoriatic arthritis  Onset: 10 yrs ago  Symptoms: itching and flaking of scalp  Aggravating factors: none  Alleviating factors: clobetasol foam or solution (prefers foam)  Treatments: see above  Has seen a number of rheumatologists over the years but is not seeking treatment for arthritis at this time, prefers to continue with lifestyle modifications as she is going through some neurological work up at this time.     History of skin cancer: No  History of precancers/actinic keratoses: No  History of biopsies:No  History of blistering/severe sunburns:No  Family history of skin cancer:No  Family history of atypical moles:Yes, Details: mom unknown      Tobacco use: Never  Alcohol use:DRINKS: occasionally in social situations  Allergies:Yes, Details: see allergies       ROS: no fevers/chills. No itch.  No cough  Relevant PMH:mult med comorbidities  Social: NS, with son    PE: Gen:WDWN female in NAD. Skin: Scalp/face/eyes/lips/neck/chest/back/arms/legs/hands/feet/buttocks/outer labia - without suspicious lesions noted.   -acneiform papules, chin  -<0.5cm scaling on left sup eyelid  -scattered hyperpigmented macules/papules, appearing benign on face, torso and extremities. Dome-shaped X 2 on right leg - lower and thigh  -cherry red macules/papules on torso  -KP arms and xerosis noted      A/P: PSO, not active today on scalp  -OTC antidandruff shampoos - pick 2 and alternate    PSO, small patch, left eyelid:  -OTC HCT cream BID PRN    KP, " arms:  -reviewed amlactin/SA trx  -dry skin care handout supplied    Xerosis, diffuse:  -counseled re: dx/tx  -OTC moisturizers recommended    DF/Nevi: benign appearing:  -Reviewed ABCDEs  -Reviewed skin cancer detection/prevention  -RTC PRN growth/changes/concerning features    Lentigos/SKs: benign  -reassurance  -reviewed skin cancer detection/prevention    Cherry angioma: benign    Acne, chin: consider hormonal:   -to cont samantha tab    I have reviewed medications relevant to my specialty.      F/u 1 year/PRN

## 2022-06-23 ENCOUNTER — HOSPITAL ENCOUNTER (OUTPATIENT)
Dept: LAB | Facility: MEDICAL CENTER | Age: 42
End: 2022-06-23
Attending: FAMILY MEDICINE
Payer: COMMERCIAL

## 2022-06-23 DIAGNOSIS — R30.0 DYSURIA: ICD-10-CM

## 2022-06-23 LAB
APPEARANCE UR: CLEAR
BACTERIA #/AREA URNS HPF: NEGATIVE /HPF
BILIRUB UR QL STRIP.AUTO: NEGATIVE
COLOR UR: YELLOW
EPI CELLS #/AREA URNS HPF: NEGATIVE /HPF
GLUCOSE UR STRIP.AUTO-MCNC: NEGATIVE MG/DL
HYALINE CASTS #/AREA URNS LPF: ABNORMAL /LPF
KETONES UR STRIP.AUTO-MCNC: NEGATIVE MG/DL
LEUKOCYTE ESTERASE UR QL STRIP.AUTO: ABNORMAL
MICRO URNS: ABNORMAL
NITRITE UR QL STRIP.AUTO: NEGATIVE
PH UR STRIP.AUTO: 6.5 [PH] (ref 5–8)
PROT UR QL STRIP: NEGATIVE MG/DL
RBC # URNS HPF: ABNORMAL /HPF
RBC UR QL AUTO: NEGATIVE
SP GR UR STRIP.AUTO: 1.01
UROBILINOGEN UR STRIP.AUTO-MCNC: 0.2 MG/DL
WBC #/AREA URNS HPF: ABNORMAL /HPF

## 2022-06-23 PROCEDURE — 87077 CULTURE AEROBIC IDENTIFY: CPT

## 2022-06-23 PROCEDURE — 81001 URINALYSIS AUTO W/SCOPE: CPT

## 2022-06-23 PROCEDURE — 87086 URINE CULTURE/COLONY COUNT: CPT

## 2022-06-23 PROCEDURE — 87186 SC STD MICRODIL/AGAR DIL: CPT

## 2022-06-25 LAB
BACTERIA UR CULT: ABNORMAL
BACTERIA UR CULT: ABNORMAL
SIGNIFICANT IND 70042: ABNORMAL
SITE SITE: ABNORMAL
SOURCE SOURCE: ABNORMAL

## 2022-06-28 ENCOUNTER — HOSPITAL ENCOUNTER (OUTPATIENT)
Dept: RADIOLOGY | Facility: MEDICAL CENTER | Age: 42
End: 2022-06-28
Attending: FAMILY MEDICINE
Payer: COMMERCIAL

## 2022-06-28 DIAGNOSIS — R92.8 ABNORMAL FINDING ON RADIOLOGICAL EXAMINATION OF BREAST: ICD-10-CM

## 2022-06-28 PROCEDURE — 76642 ULTRASOUND BREAST LIMITED: CPT | Mod: RT

## 2022-06-28 PROCEDURE — G0279 TOMOSYNTHESIS, MAMMO: HCPCS

## 2022-07-06 ENCOUNTER — OFFICE VISIT (OUTPATIENT)
Dept: MEDICAL GROUP | Facility: IMAGING CENTER | Age: 42
End: 2022-07-06
Payer: COMMERCIAL

## 2022-07-06 ENCOUNTER — TELEPHONE (OUTPATIENT)
Dept: MEDICAL GROUP | Facility: IMAGING CENTER | Age: 42
End: 2022-07-06
Payer: COMMERCIAL

## 2022-07-06 VITALS
TEMPERATURE: 98.7 F | SYSTOLIC BLOOD PRESSURE: 118 MMHG | DIASTOLIC BLOOD PRESSURE: 70 MMHG | BODY MASS INDEX: 27.31 KG/M2 | RESPIRATION RATE: 14 BRPM | OXYGEN SATURATION: 100 % | HEART RATE: 80 BPM | WEIGHT: 160 LBS | HEIGHT: 64 IN

## 2022-07-06 DIAGNOSIS — E66.3 OVERWEIGHT (BMI 25.0-29.9): ICD-10-CM

## 2022-07-06 DIAGNOSIS — R39.9 URINARY SYMPTOM OR SIGN: ICD-10-CM

## 2022-07-06 DIAGNOSIS — Z87.440 HISTORY OF UTI: ICD-10-CM

## 2022-07-06 DIAGNOSIS — R33.9 INCOMPLETE BLADDER EMPTYING: ICD-10-CM

## 2022-07-06 LAB
APPEARANCE UR: CLEAR
BILIRUB UR STRIP-MCNC: NEGATIVE MG/DL
COLOR UR AUTO: NORMAL
GLUCOSE UR STRIP.AUTO-MCNC: NEGATIVE MG/DL
KETONES UR STRIP.AUTO-MCNC: NEGATIVE MG/DL
LEUKOCYTE ESTERASE UR QL STRIP.AUTO: NEGATIVE
NITRITE UR QL STRIP.AUTO: NEGATIVE
PH UR STRIP.AUTO: 7 [PH] (ref 5–8)
PROT UR QL STRIP: NEGATIVE MG/DL
RBC UR QL AUTO: NEGATIVE
SP GR UR STRIP.AUTO: 1.01
UROBILINOGEN UR STRIP-MCNC: 0.2 MG/DL

## 2022-07-06 PROCEDURE — 81002 URINALYSIS NONAUTO W/O SCOPE: CPT | Performed by: FAMILY MEDICINE

## 2022-07-06 PROCEDURE — 99214 OFFICE O/P EST MOD 30 MIN: CPT | Performed by: FAMILY MEDICINE

## 2022-07-06 ASSESSMENT — PAIN SCALES - GENERAL: PAINLEVEL: 2=MINIMAL-SLIGHT

## 2022-07-06 ASSESSMENT — FIBROSIS 4 INDEX: FIB4 SCORE: 0.64

## 2022-07-06 NOTE — TELEPHONE ENCOUNTER
VOICEMAIL  1. Caller Name: Nubia Arce                      Call Back Number: [phone] (home)     2. Message: Pt rtn call to schedule appt with PCP today.

## 2022-07-06 NOTE — PROGRESS NOTES
SUBJECTIVE:    Chief Complaint   Patient presents with   • Urinary Frequency     X 2.5 weeks        HPI:     Nubia Arce is a 41 y.o. female with history of hypothyroidism, psoriatic arthritis, seasonal depression, anxiety, pineal gland cyst, meningioma, MTHFR gene mutation and reflux here for follow up of urinary symptoms.     6/17/22 - urinary symptoms started. [place] 2 night prior to symptoms occurring.   Constant urge to go. Morning or at night slight pain urination but not all the time.   Tenderness and pressure suprapubic region as well as urgency and frequency.   Slight odor changes with second antibiotic.   Had Teledoc- 5 days of bactrim prescribed. Then with obtaining culture and sensitivity- Macrobid for 5 days.  Finished yesterday.  She does feel improved but still with some slight symptoms as above. May feel slightly tender and bloated.  Has been taking d-mannose and cranberry supplements as well.  No hematuria.  No flank pain, but may intermittently have low back pain and lumbar area.    Does not feel she may be completely emptying.  She has had some similar symptoms in the past for which she did have a renal ultrasound/PVR as well as urology consultation.  Last ultrasound was about a year ago.  States she did not complete the testing mended by urology as her symptoms got better.    Hemoglobin A1c was 5.5%.  States she has been eating better.  Is better off gluten.  States that when she was eating no grains she was at a lower weight.  She does plan to focus on more carbohydrate intake.  She has been gaining weight.  She is active with hiking.  Would like to start a program.  Plans to work with a .  Body mass index is 27.46 kg/m².      ROS:  No recent fevers or chills. No nausea or vomiting. No diarrhea. No chest pains or shortness of breath. No lower extremity edema.    Current Outpatient Medications on File Prior to Visit   Medication Sig Dispense Refill   • nitrofurantoin  (MACROBID) 100 MG Cap Take 1 Capsule by mouth 2 times a day. (Patient not taking: Reported on 7/6/2022) 10 Capsule 0   • KAL 0.35 MG tablet Take 1 Tablet by mouth every day.     • D-MANNOSE PO Take  by mouth.     • clobetasol (OLUX) 0.05 % foam Apply once daily to affected area of scalp as needed until scalp is smooth 50 g 4   • Ibuprofen 200 MG Cap Take  by mouth 2 times a day.     • CRANBERRY PO Take  by mouth.     • Ascorbic Acid (VITAMIN C) 1000 MG Tab Take  by mouth.     • Turmeric 450 MG Cap Take  by mouth.     • Omega-3 Fatty Acids (FISH OIL) 1000 MG Cap capsule Take 1,000 mg by mouth 3 times a day, with meals.     • cyanocobalamin (VITAMIN B-12) 500 MCG Tab Take 500 mcg by mouth every day.     • sulfamethoxazole-trimethoprim (BACTRIM DS) 800-160 MG tablet Take 1 Tablet by mouth 2 times a day. (Patient not taking: Reported on 7/6/2022) 6 Tablet 0   • sertraline (ZOLOFT) 25 MG tablet TAKE 1.5 TABLETS BY MOUTH EVERY DAY (Patient not taking: Reported on 6/16/2022) 135 Tablet 1   • acyclovir (ZOVIRAX) 400 MG tablet Take 1 Tablet by mouth 3 times a day. Take at first sign of symptoms for 5 days. 15 Tablet 2   • IRON PO Take 2 Tablets by mouth. Chelated ferrous iron (Patient not taking: No sig reported)       No current facility-administered medications on file prior to visit.       Allergies   Allergen Reactions   • Doxycycline Vomiting     2016        Patient Active Problem List    Diagnosis Date Noted   • Itching 04/16/2021   • Urinary symptom or sign 04/16/2021   • Tension-type headache, not intractable 03/26/2020   • H. pylori infection 03/26/2020   • Dense breast tissue 07/12/2019   • MTHFR gene mutation    • Breast tenderness in female 01/17/2019   • Acquired hypothyroidism 11/07/2018   • Iron deficiency anemia due to chronic blood loss 07/27/2018   • Dysesthesia 05/25/2018   • Meningioma (HCC) 05/25/2018   • Lymphadenopathy of head and neck 02/01/2018   • Abnormal brain MRI 01/23/2018   • Pineal gland  "cyst 01/23/2018   • Dizziness 12/29/2017   • Seasonal depression (HCC) 12/29/2017   • Numbness and tingling of left side of face 09/05/2017   • Decreased sensation of lower extremity 06/30/2017   • Gastroesophageal reflux disease 06/30/2017   • Neck pain 03/13/2017   • Psoriasis 02/09/2017   • Chronic LUQ pain 02/09/2017       Past Medical History:   Diagnosis Date   • Adenomyosis     aug 2018   • Allergy    • Anxiety    • Depression    • Generalized hypermobility of joints    • Genital HSV    • HLA B27 (HLA B27 positive)    • Meningioma (HCC)     Jan 2017   • MTHFR gene mutation    • 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   • Psoriatic arthritis ([facility])          OBJECTIVE:   /70   Pulse 80   Temp 37.1 °C (98.7 °F)   Resp 14   Ht 1.626 m (5' 4\")   Wt 72.6 kg (160 lb)   LMP 06/24/2022   SpO2 100%   BMI 27.46 kg/m²   General: Well-developed well-nourished female, no acute distress  Neck: supple, no lymphadenopathy- cervical or supraclavicular, no thyromegaly  Cardiovascular: regular rate and rhythm, no murmurs, gallops, rubs  Lungs: clear to auscultation bilaterally, no wheezes, crackles, or rhonchi  Abdomen: +bowel sounds, soft, minimal discomfort throughout with palpation.  Nondistended, no rebound, no guarding, no hepatosplenomegaly.  No CVA tenderness.   Extremities: no cyanosis, clubbing, edema  Skin: Warm and dry  Psych: appropriate mood and affect      Narrative & Impression     6/10/2021 7:59 AM     HISTORY/REASON FOR EXAM:  Urinary retention     TECHNIQUE/EXAM DESCRIPTION:  Renal ultrasound.     COMPARISON:  None     FINDINGS:  The right kidney measures 9.74 cm.  The left kidney measures 9.90 cm.     No hydronephrosis.  No renal stones are detected.     The bladder demonstrates no focal wall abnormality. Post void residual of 136 mL.     IMPRESSION:     1.  Unremarkable kidneys. No hydronephrosis.  2.  Postvoid residual of 136 mL.             Exam Ended: 06/10/21  8:49 AM Last Resulted: 06/10/21 11:56 AM       "      2 wk ago    Significant Indicator POS Positive (POS)    Source UR    Site -    Culture Result - Abnormal     Culture Result  Abnormal   Enterococcus faecalis   ,000 cfu/mL     Resulting Agency M          Susceptibility     Enterococcus faecalis     LILIANA     Ampicillin <=2 mcg/mL Sensitive     Daptomycin 2 mcg/mL Sensitive     Nitrofurantoin <=32 mcg/mL Sensitive     Penicillin 2 mcg/mL Sensitive     Tetracycline <=4 mcg/mL Sensitive     Vancomycin 1 mcg/mL Sensitive                         ASSESSMENT/PLAN:    41 y.o.female with history of hypothyroidism, psoriatic arthritis, seasonal depression, anxiety, pineal gland cyst, meningioma, MTHFR gene mutation and reflux     1. Urinary symptom or sign  POCT Urinalysis    US-RENAL    URINALYSIS,CULTURE IF INDICATED   2. Incomplete bladder emptying  US-RENAL   3. History of UTI  US-RENAL   4. Overweight (BMI 25.0-29.9)     -She did complete 2 courses of antibiotic therapy with some improvement but appears to have some residual symptoms of urinary urgency and incomplete bladder emptying.  Urine POC negative today.  She did have a postvoid residual as above in the past.  Discussed possible symptoms of interstitial cystitis.   Reviewed recommendations for consideration of modification of diet.  Will plan to repeat postvoid residual at this time.  Patient to complete repeat UA if any worsening symptoms.  May further follow-up with urology in the future.  Continue to monitor at this time  -Notes some weight gain and difficulty with weight loss.  Reviewed possible resources.  Recommend consideration of health  or health  pedro.   Otherwise may consider working with nutritionist in the future.  Monitor.    Return in about 2 weeks (around 7/20/2022).  Follow-up sooner as needed.    This medical record contains text that has been entered with the assistance of computer voice recognition and dictation software.  Therefore, it may contain unintended errors in text,  spelling, punctuation, or grammar.

## 2022-07-12 ENCOUNTER — HOSPITAL ENCOUNTER (OUTPATIENT)
Dept: RADIOLOGY | Facility: MEDICAL CENTER | Age: 42
End: 2022-07-12
Attending: FAMILY MEDICINE
Payer: COMMERCIAL

## 2022-07-12 DIAGNOSIS — R33.9 INCOMPLETE BLADDER EMPTYING: ICD-10-CM

## 2022-07-12 DIAGNOSIS — Z87.440 HISTORY OF UTI: ICD-10-CM

## 2022-07-12 DIAGNOSIS — R39.9 URINARY SYMPTOM OR SIGN: ICD-10-CM

## 2022-07-12 PROCEDURE — 76775 US EXAM ABDO BACK WALL LIM: CPT

## 2022-08-18 ENCOUNTER — OFFICE VISIT (OUTPATIENT)
Dept: MEDICAL GROUP | Facility: IMAGING CENTER | Age: 42
End: 2022-08-18
Payer: COMMERCIAL

## 2022-08-18 VITALS
HEART RATE: 100 BPM | RESPIRATION RATE: 14 BRPM | OXYGEN SATURATION: 100 % | BODY MASS INDEX: 26.94 KG/M2 | DIASTOLIC BLOOD PRESSURE: 74 MMHG | TEMPERATURE: 98.5 F | SYSTOLIC BLOOD PRESSURE: 112 MMHG | WEIGHT: 157.8 LBS | HEIGHT: 64 IN

## 2022-08-18 DIAGNOSIS — R33.9 INCOMPLETE BLADDER EMPTYING: ICD-10-CM

## 2022-08-18 DIAGNOSIS — Z87.440 HISTORY OF UTI: ICD-10-CM

## 2022-08-18 DIAGNOSIS — L29.9 ITCHING: ICD-10-CM

## 2022-08-18 DIAGNOSIS — R39.9 URINARY SYMPTOM OR SIGN: ICD-10-CM

## 2022-08-18 DIAGNOSIS — Z87.898 HISTORY OF DYSURIA: ICD-10-CM

## 2022-08-18 DIAGNOSIS — R53.83 FATIGUE, UNSPECIFIED TYPE: ICD-10-CM

## 2022-08-18 DIAGNOSIS — R63.5 WEIGHT GAIN: ICD-10-CM

## 2022-08-18 PROCEDURE — 99214 OFFICE O/P EST MOD 30 MIN: CPT | Performed by: FAMILY MEDICINE

## 2022-08-18 RX ORDER — MELOXICAM 7.5 MG/1
TABLET ORAL
COMMUNITY
Start: 2022-06-29 | End: 2022-11-10

## 2022-08-18 RX ORDER — CETIRIZINE HYDROCHLORIDE 10 MG/1
10 TABLET ORAL DAILY
COMMUNITY

## 2022-08-18 ASSESSMENT — FIBROSIS 4 INDEX: FIB4 SCORE: 0.64

## 2022-08-18 ASSESSMENT — PAIN SCALES - GENERAL: PAINLEVEL: NO PAIN

## 2022-08-18 NOTE — PROGRESS NOTES
SUBJECTIVE:    Chief Complaint   Patient presents with    Itching     All over x 1 month     Urinary Retention     Improving        HPI:     Nubia Arce is a 41 y.o. female  with history of hypothyroidism, psoriatic arthritis, seasonal depression, anxiety, pineal gland cyst, meningioma, MTHFR gene mutation and reflux here for follow-up of urinary symptoms.    No dysuria but some feeling of incomplete bladder emptying.  She has been evaluated for this by urology in the past but would like to consider new provider.  Bacteria was noted on prior urine culture and she had 2 rounds of antibiotics, got worse before it got better.  Discussed possibility of interstitial cystitis, this stopped cranberry. Possibly some slight symptoms morning.     About a month ago after her last visit has noticed some itching all over.  No specific rash.  Had similar symptoms prior for about 6 months in the past.  Has been taking Allertec.  Wondering if symptoms could be associated with taking antibiotics.  She increased her d-mannose and wonders if it could be associated but stopped it yesterday.    Has had some concerns for weight gain and fatigue.  Has had prior normal thyroid labs but would like to consider an extended thyroid labs.  Started weight watchers about 2 weeks ago.    She is currently on progesterone to regulate her periods.      ROS:  No recent fevers or chills. No nausea or vomiting. No diarrhea. No chest pains or shortness of breath. No lower extremity edema.    Current Outpatient Medications on File Prior to Visit   Medication Sig Dispense Refill    meloxicam (MOBIC) 7.5 MG Tab 1 TABLET ORALLY TWICE A DAY AS NEEDED FOR JOINT PAIN 30 DAY(S)      cetirizine (KLS ALLER-OLIVER) 10 MG Tab Take 10 mg by mouth every day.      nitrofurantoin (MACROBID) 100 MG Cap Take 1 Capsule by mouth 2 times a day. (Patient not taking: No sig reported) 10 Capsule 0    KAL 0.35 MG tablet Take 1 Tablet by mouth every day.       D-MANNOSE PO Take  by mouth.      clobetasol (OLUX) 0.05 % foam Apply once daily to affected area of scalp as needed until scalp is smooth 50 g 4    Ibuprofen 200 MG Cap Take  by mouth 2 times a day.      Ascorbic Acid (VITAMIN C) 1000 MG Tab Take  by mouth.      Turmeric 450 MG Cap Take  by mouth.      Omega-3 Fatty Acids (FISH OIL) 1000 MG Cap capsule Take 1,000 mg by mouth 3 times a day, with meals.      cyanocobalamin (VITAMIN B-12) 500 MCG Tab Take 500 mcg by mouth every day.      sulfamethoxazole-trimethoprim (BACTRIM DS) 800-160 MG tablet Take 1 Tablet by mouth 2 times a day. (Patient not taking: No sig reported) 6 Tablet 0    sertraline (ZOLOFT) 25 MG tablet TAKE 1.5 TABLETS BY MOUTH EVERY DAY (Patient not taking: Reported on 6/16/2022) 135 Tablet 1    acyclovir (ZOVIRAX) 400 MG tablet Take 1 Tablet by mouth 3 times a day. Take at first sign of symptoms for 5 days. 15 Tablet 2    CRANBERRY PO Take  by mouth. (Patient not taking: Reported on 8/18/2022)       No current facility-administered medications on file prior to visit.       Allergies   Allergen Reactions    Doxycycline Vomiting     2016        Patient Active Problem List    Diagnosis Date Noted    Itching 04/16/2021    Urinary symptom or sign 04/16/2021    Tension-type headache, not intractable 03/26/2020    H. pylori infection 03/26/2020    Dense breast tissue 07/12/2019    MTHFR gene mutation     Breast tenderness in female 01/17/2019    Acquired hypothyroidism 11/07/2018    Iron deficiency anemia due to chronic blood loss 07/27/2018    Dysesthesia 05/25/2018    Meningioma (HCC) 05/25/2018    Lymphadenopathy of head and neck 02/01/2018    Abnormal brain MRI 01/23/2018    Pineal gland cyst 01/23/2018    Dizziness 12/29/2017    Seasonal depression (HCC) 12/29/2017    Numbness and tingling of left side of face 09/05/2017    Decreased sensation of lower extremity 06/30/2017    Gastroesophageal reflux disease 06/30/2017    Neck pain 03/13/2017     "Psoriasis 02/09/2017    Chronic LUQ pain 02/09/2017       Past Medical History:   Diagnosis Date    Adenomyosis     aug 2018    Allergy     Anxiety     Depression     Generalized hypermobility of joints     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)     Meningioma (HCC)     Jan 2017    MTHFR gene mutation     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    Psoriatic arthritis (HCC)          OBJECTIVE:   /74   Pulse 100   Temp 36.9 °C (98.5 °F)   Resp 14   Ht 1.626 m (5' 4\")   Wt 71.6 kg (157 lb 12.8 oz)   LMP 07/16/2022 (Within Days)   SpO2 100%   BMI 27.09 kg/m²   General: Well-developed well-nourished female, no acute distress  Neck: supple, no lymphadenopathy- cervical or supraclavicular, no thyromegaly  Cardiovascular: regular rate and rhythm, no murmurs, gallops, rubs  Lungs: clear to auscultation bilaterally, no wheezes, crackles, or rhonchi  Abdomen: +bowel sounds, soft, nontender, nondistended, no rebound, no guarding, no hepatosplenomegaly, no CVAT  Extremities: no cyanosis, clubbing, edema  Skin: Warm and dry  Psych: appropriate mood and affect    Contains abnormal data URINE CULTURE(NEW)  Order: [id number] - Reflex for Order [id number]  Status: Final result    Visible to patient: Yes (seen)    Next appt: 09/08/2022 at 01:20 PM [facility] (Yessica Duke M.D.)    Specimen Information: Urine   0 Result Notes  Component 1 mo ago    Significant Indicator POS Positive (POS)    Source UR    Site -    Culture Result - Abnormal     Culture Result  Abnormal   Enterococcus faecalis   ,000 cfu/mL     Resulting Agency M        Susceptibility     Enterococcus faecalis     LILIANA     Ampicillin <=2 mcg/mL Sensitive     Daptomycin 2 mcg/mL Sensitive     Nitrofurantoin <=32 mcg/mL Sensitive     Penicillin 2 mcg/mL Sensitive     Tetracycline <=4 mcg/mL Sensitive     Vancomycin 1 mcg/mL Sensitive                           ASSESSMENT/PLAN:    41 y.o.female  with history of hypothyroidism, psoriatic arthritis, seasonal " depression, anxiety, pineal gland cyst, meningioma, MTHFR gene mutation and reflux.      1. Urinary symptom or sign-occasional slight urinary discomfort and incomplete bladder emptying symptoms.  Had prior treatment for urinary tract infection with 2 courses of antibiotics.  Consider possible interstitial cystitis as well and may consider further dietary changes.  Will refer to urogynecology for further evaluation and management at this time.  Referral to Urogynecology    URINALYSIS,CULTURE IF INDICATED      2. Incomplete bladder emptying  Referral to Urogynecology      3. History of dysuria  Referral to Urogynecology      4. History of UTI  Referral to urogynecology      5. Itching-May be associated with prior course of antibiotic therapy.    Recommend consider Candida diet at this time.  May continue on allergy medication at this time. Monitor.        6. Fatigue, unspecified type   Will assess further with lab work. CBC WITH DIFFERENTIAL    TSH    TRIIDOTHYRONINE    FREE THYROXINE    THYROID PEROXIDASE  (TPO) AB    ANTITHYROGLOBULIN AB      7. Weight gain   Assess further with lab work.  Continue with weight watchers. CBC WITH DIFFERENTIAL    Comp Metabolic Panel    HEMOGLOBIN A1C    TSH    TRIIDOTHYRONINE    FREE THYROXINE    THYROID PEROXIDASE  (TPO) AB    ANTITHYROGLOBULIN AB          Return in about 3 weeks (around 9/8/2022).    This medical record contains text that has been entered with the assistance of computer voice recognition and dictation software.  Therefore, it may contain unintended errors in text, spelling, punctuation, or grammar.

## 2022-08-24 ENCOUNTER — OFFICE VISIT (OUTPATIENT)
Dept: URGENT CARE | Facility: CLINIC | Age: 42
End: 2022-08-24
Payer: COMMERCIAL

## 2022-08-24 ENCOUNTER — HOSPITAL ENCOUNTER (OUTPATIENT)
Facility: MEDICAL CENTER | Age: 42
End: 2022-08-24
Attending: NURSE PRACTITIONER
Payer: COMMERCIAL

## 2022-08-24 VITALS
WEIGHT: 154 LBS | HEART RATE: 113 BPM | SYSTOLIC BLOOD PRESSURE: 112 MMHG | OXYGEN SATURATION: 100 % | RESPIRATION RATE: 18 BRPM | HEIGHT: 64 IN | TEMPERATURE: 97.9 F | DIASTOLIC BLOOD PRESSURE: 70 MMHG | BODY MASS INDEX: 26.29 KG/M2

## 2022-08-24 DIAGNOSIS — H65.191 ACUTE EFFUSION OF RIGHT EAR: ICD-10-CM

## 2022-08-24 DIAGNOSIS — J06.9 URI WITH COUGH AND CONGESTION: ICD-10-CM

## 2022-08-24 DIAGNOSIS — Z20.818 STREP THROAT EXPOSURE: ICD-10-CM

## 2022-08-24 LAB
INT CON NEG: NORMAL
INT CON POS: NORMAL
S PYO AG THROAT QL: NEGATIVE

## 2022-08-24 PROCEDURE — 99213 OFFICE O/P EST LOW 20 MIN: CPT | Performed by: NURSE PRACTITIONER

## 2022-08-24 PROCEDURE — 87880 STREP A ASSAY W/OPTIC: CPT | Performed by: NURSE PRACTITIONER

## 2022-08-24 PROCEDURE — 0240U HCHG SARS-COV-2 COVID-19 NFCT DS RESP RNA 3 TRGT MIC: CPT

## 2022-08-24 RX ORDER — TIZANIDINE 4 MG/1
TABLET ORAL
COMMUNITY
Start: 2022-06-29 | End: 2022-11-10

## 2022-08-24 ASSESSMENT — ENCOUNTER SYMPTOMS
HEMOPTYSIS: 0
NAUSEA: 0
DIARRHEA: 0
HEADACHES: 1
SPUTUM PRODUCTION: 0
FEVER: 0
COUGH: 1
SHORTNESS OF BREATH: 1
SORE THROAT: 1
WHEEZING: 0

## 2022-08-24 ASSESSMENT — FIBROSIS 4 INDEX: FIB4 SCORE: 0.64

## 2022-08-24 NOTE — PROGRESS NOTES
Subjective:     Nubia Arce is a 41 y.o. female who presents for Congestion (X5days, cough, congestion, right ear pain, body aches, runny nose)      Right ear pressure. Reported mild SOB with exertion, and with walking up hill. At home COVID tests were negative, last one was yesterday. Taking aller-valery. Co-worker had strep throat. Son has URI symptoms.     Cough  This is a new problem. The current episode started in the past 7 days. The cough is Non-productive. Associated symptoms include ear pain, headaches, nasal congestion, a sore throat and shortness of breath. Pertinent negatives include no fever, hemoptysis or wheezing. Treatments tried: Ibuprofen.     Past Medical History:   Diagnosis Date    Adenomyosis     aug 2018    Allergy     Anxiety     Depression     Generalized hypermobility of joints     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)     Meningioma (HCC)     Jan 2017    MTHFR gene mutation     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    Psoriatic arthritis ([facility])        Past Surgical History:   Procedure Laterality Date    LUMPECTOMY  1996    left breast fibroadenoma removed    PRIMARY C SECTION         Social History     Socioeconomic History    Marital status:      Spouse name: Not on file    Number of children: Not on file    Years of education: Not on file    Highest education level: Not on file   Occupational History    Not on file   Tobacco Use    Smoking status: Never    Smokeless tobacco: Never   Vaping Use    Vaping Use: Never used   Substance and Sexual Activity    Alcohol use: Yes     Alcohol/week: 1.8 - 3.6 oz     Types: 3 - 6 Standard drinks or equivalent per week    Drug use: No     Comment: cbd oil prn    Sexual activity: Yes     Partners: Male     Birth control/protection: Condom   Other Topics Concern    Not on file   Social History Narrative    Not on file     Social Determinants of Health     Financial Resource Strain: Not on file   Food Insecurity: Not on file   Transportation  "Needs: Not on file   Physical Activity: Not on file   Stress: Not on file   Social Connections: Not on file   Intimate Partner Violence: Not on file   Housing Stability: Not on file        Family History   Problem Relation Age of Onset    Psychiatric Illness Mother         depression    Hypertension Mother     Arthritis Father         psoriatic    Hypertension Father     Psychiatric Illness Father         depression    Cancer Maternal Grandmother     Cancer Maternal Grandfather         stomach    Other Son         speech apraxia        Allergies   Allergen Reactions    Doxycycline Vomiting     2016        Review of Systems   Constitutional:  Positive for malaise/fatigue. Negative for fever.   HENT:  Positive for congestion, ear pain and sore throat.    Respiratory:  Positive for cough and shortness of breath. Negative for hemoptysis, sputum production and wheezing.    Gastrointestinal:  Negative for diarrhea and nausea.   Neurological:  Positive for headaches.   All other systems reviewed and are negative.     Objective:   /70 (BP Location: Left arm, Patient Position: Sitting, BP Cuff Size: Adult)   Pulse (!) 113   Temp 36.6 °C (97.9 °F) (Temporal)   Resp 18   Ht 1.626 m (5' 4\")   Wt 69.9 kg (154 lb)   SpO2 100%   BMI 26.43 kg/m²     Physical Exam  Vitals reviewed.   Constitutional:       General: She is not in acute distress.     Appearance: She is well-developed. She is ill-appearing. She is not toxic-appearing.   HENT:      Head: Normocephalic and atraumatic.      Right Ear: Ear canal and external ear normal. No drainage, swelling or tenderness. A middle ear effusion is present. No mastoid tenderness. Tympanic membrane is not injected, perforated or erythematous.      Left Ear: Tympanic membrane, ear canal and external ear normal.      Nose: Mucosal edema present.      Mouth/Throat:      Lips: Pink.      Mouth: Mucous membranes are moist. No oral lesions.      Pharynx: Uvula midline. Posterior " oropharyngeal erythema present.      Tonsils: No tonsillar exudate or tonsillar abscesses.   Eyes:      Conjunctiva/sclera: Conjunctivae normal.   Cardiovascular:      Rate and Rhythm: Normal rate.   Pulmonary:      Effort: Pulmonary effort is normal. No respiratory distress.      Breath sounds: Normal breath sounds. No stridor. No wheezing, rhonchi or rales.   Musculoskeletal:         General: Normal range of motion.      Cervical back: Normal range of motion and neck supple.   Lymphadenopathy:      Head:      Right side of head: Tonsillar adenopathy present.      Left side of head: Tonsillar adenopathy present.   Skin:     General: Skin is warm and dry.      Findings: No rash.   Neurological:      General: No focal deficit present.      Mental Status: She is alert and oriented to person, place, and time.      GCS: GCS eye subscore is 4. GCS verbal subscore is 5. GCS motor subscore is 6.   Psychiatric:         Mood and Affect: Mood normal.         Speech: Speech normal.         Behavior: Behavior normal.         Thought Content: Thought content normal.         Judgment: Judgment normal.       Assessment/Plan:   1. URI with cough and congestion  - SARS-CoV-2 PCR (24 hour In-House): Collect NP swab in VTM; Future  - CoV-2 and Flu A/B by PCR (24 hour In-House): Collect NP swab in VTM; Future    2. Acute effusion of right ear    3. Strep throat exposure  - POCT Rapid Strep A  Results for orders placed or performed in visit on 08/24/22   POCT Rapid Strep A   Result Value Ref Range    Rapid Strep Screen Negative     Internal Control Positive Valid     Internal Control Negative Valid      Symptomatic care.  -Oral hydration and rest.   -Cough control: nonpharmacologic options for cough relief such as throat lozenges, hot tea, honey.  -Over the counter expectorant as directed; Guaifenesin (Mucinex).  -Tylenol or ibuprofen for pain and fever as directed.   -Warm salt water gargles.  -OTC Throat lozenges or spray  (Cepacol).  -Sudafed.    Seek emergency medical care immediately for: Trouble breathing, persistent pain or pressure in the chest, confusion, inability to wake or stay awake, bluish lips or face, persistent tachycardia (fast heart rate), prolonged dizziness, persistent high grade fevers. Follow up for prolonged cough, persistent wheezing, persistent throat pain, difficulty swallowing, persistent fevers, leg swelling, persistent or worsening wear symptoms, or any other concerns. Follow up with your Primary Care Provider.     -Discussed viral etiology. COVID S&S, and self isolation guidelines. S&S of PNA with follow up. Clear breath sounds.     Differential diagnosis, natural history, supportive care, and indications for immediate follow-up discussed.

## 2022-08-24 NOTE — PATIENT INSTRUCTIONS
Symptomatic care.  -Oral hydration and rest.   -Cough control: nonpharmacologic options for cough relief such as throat lozenges, hot tea, honey.  -Over the counter expectorant as directed; Guaifenesin (Mucinex).  -Tylenol or ibuprofen for pain and fever as directed.   -Warm salt water gargles.  -OTC Throat lozenges or spray (Cepacol).  -Sudafed for ear    Seek emergency medical care immediately for: Trouble breathing, persistent pain or pressure in the chest, confusion, inability to wake or stay awake, bluish lips or face, persistent tachycardia (fast heart rate), prolonged dizziness, persistent high grade fevers. Follow up for prolonged cough, persistent wheezing, persistent throat pain, difficulty swallowing, persistent fevers, leg swelling, persistent or worsening wear symptoms, or any other concerns. Follow up with your Primary Care Provider.

## 2022-08-25 LAB
FLUAV RNA SPEC QL NAA+PROBE: NEGATIVE
FLUBV RNA SPEC QL NAA+PROBE: NEGATIVE
SARS-COV-2 RNA RESP QL NAA+PROBE: NOTDETECTED
SPECIMEN SOURCE: NORMAL

## 2022-09-02 ENCOUNTER — HOSPITAL ENCOUNTER (OUTPATIENT)
Dept: LAB | Facility: MEDICAL CENTER | Age: 42
End: 2022-09-02
Attending: FAMILY MEDICINE
Payer: COMMERCIAL

## 2022-09-02 DIAGNOSIS — R63.5 WEIGHT GAIN: ICD-10-CM

## 2022-09-02 DIAGNOSIS — R39.9 URINARY SYMPTOM OR SIGN: ICD-10-CM

## 2022-09-02 DIAGNOSIS — R53.83 FATIGUE, UNSPECIFIED TYPE: ICD-10-CM

## 2022-09-02 LAB
ALBUMIN SERPL BCP-MCNC: 4.1 G/DL (ref 3.2–4.9)
ALBUMIN/GLOB SERPL: 1.5 G/DL
ALP SERPL-CCNC: 40 U/L (ref 30–99)
ALT SERPL-CCNC: 9 U/L (ref 2–50)
ANION GAP SERPL CALC-SCNC: 11 MMOL/L (ref 7–16)
APPEARANCE UR: CLEAR
AST SERPL-CCNC: 11 U/L (ref 12–45)
BASOPHILS # BLD AUTO: 0.8 % (ref 0–1.8)
BASOPHILS # BLD: 0.03 K/UL (ref 0–0.12)
BILIRUB SERPL-MCNC: 0.3 MG/DL (ref 0.1–1.5)
BILIRUB UR QL STRIP.AUTO: NEGATIVE
BUN SERPL-MCNC: 13 MG/DL (ref 8–22)
CALCIUM SERPL-MCNC: 9 MG/DL (ref 8.5–10.5)
CHLORIDE SERPL-SCNC: 105 MMOL/L (ref 96–112)
CO2 SERPL-SCNC: 24 MMOL/L (ref 20–33)
COLOR UR: YELLOW
CREAT SERPL-MCNC: 0.74 MG/DL (ref 0.5–1.4)
EOSINOPHIL # BLD AUTO: 0.13 K/UL (ref 0–0.51)
EOSINOPHIL NFR BLD: 3.3 % (ref 0–6.9)
ERYTHROCYTE [DISTWIDTH] IN BLOOD BY AUTOMATED COUNT: 40.8 FL (ref 35.9–50)
EST. AVERAGE GLUCOSE BLD GHB EST-MCNC: 111 MG/DL
GFR SERPLBLD CREATININE-BSD FMLA CKD-EPI: 103 ML/MIN/1.73 M 2
GLOBULIN SER CALC-MCNC: 2.8 G/DL (ref 1.9–3.5)
GLUCOSE SERPL-MCNC: 93 MG/DL (ref 65–99)
GLUCOSE UR STRIP.AUTO-MCNC: NEGATIVE MG/DL
HBA1C MFR BLD: 5.5 % (ref 4–5.6)
HCT VFR BLD AUTO: 42.6 % (ref 37–47)
HGB BLD-MCNC: 13.8 G/DL (ref 12–16)
IMM GRANULOCYTES # BLD AUTO: 0 K/UL (ref 0–0.11)
IMM GRANULOCYTES NFR BLD AUTO: 0 % (ref 0–0.9)
KETONES UR STRIP.AUTO-MCNC: NEGATIVE MG/DL
LEUKOCYTE ESTERASE UR QL STRIP.AUTO: NEGATIVE
LYMPHOCYTES # BLD AUTO: 1.2 K/UL (ref 1–4.8)
LYMPHOCYTES NFR BLD: 30.6 % (ref 22–41)
MCH RBC QN AUTO: 26.6 PG (ref 27–33)
MCHC RBC AUTO-ENTMCNC: 32.4 G/DL (ref 33.6–35)
MCV RBC AUTO: 82.2 FL (ref 81.4–97.8)
MICRO URNS: NORMAL
MONOCYTES # BLD AUTO: 0.32 K/UL (ref 0–0.85)
MONOCYTES NFR BLD AUTO: 8.2 % (ref 0–13.4)
NEUTROPHILS # BLD AUTO: 2.24 K/UL (ref 2–7.15)
NEUTROPHILS NFR BLD: 57.1 % (ref 44–72)
NITRITE UR QL STRIP.AUTO: NEGATIVE
NRBC # BLD AUTO: 0 K/UL
NRBC BLD-RTO: 0 /100 WBC
PH UR STRIP.AUTO: 6 [PH] (ref 5–8)
PLATELET # BLD AUTO: 308 K/UL (ref 164–446)
PMV BLD AUTO: 9.8 FL (ref 9–12.9)
POTASSIUM SERPL-SCNC: 3.8 MMOL/L (ref 3.6–5.5)
PROT SERPL-MCNC: 6.9 G/DL (ref 6–8.2)
PROT UR QL STRIP: NEGATIVE MG/DL
RBC # BLD AUTO: 5.18 M/UL (ref 4.2–5.4)
RBC UR QL AUTO: NEGATIVE
SODIUM SERPL-SCNC: 140 MMOL/L (ref 135–145)
SP GR UR STRIP.AUTO: 1.02
T3 SERPL-MCNC: 120 NG/DL (ref 60–181)
T4 FREE SERPL-MCNC: 1.48 NG/DL (ref 0.93–1.7)
THYROPEROXIDASE AB SERPL-ACNC: <9 IU/ML (ref 0–9)
TSH SERPL DL<=0.005 MIU/L-ACNC: 2.46 UIU/ML (ref 0.38–5.33)
UROBILINOGEN UR STRIP.AUTO-MCNC: 0.2 MG/DL
WBC # BLD AUTO: 3.9 K/UL (ref 4.8–10.8)

## 2022-09-02 PROCEDURE — 80053 COMPREHEN METABOLIC PANEL: CPT

## 2022-09-02 PROCEDURE — 81003 URINALYSIS AUTO W/O SCOPE: CPT

## 2022-09-02 PROCEDURE — 85025 COMPLETE CBC W/AUTO DIFF WBC: CPT

## 2022-09-02 PROCEDURE — 83036 HEMOGLOBIN GLYCOSYLATED A1C: CPT

## 2022-09-02 PROCEDURE — 84480 ASSAY TRIIODOTHYRONINE (T3): CPT

## 2022-09-02 PROCEDURE — 86800 THYROGLOBULIN ANTIBODY: CPT

## 2022-09-02 PROCEDURE — 84443 ASSAY THYROID STIM HORMONE: CPT

## 2022-09-02 PROCEDURE — 86376 MICROSOMAL ANTIBODY EACH: CPT

## 2022-09-02 PROCEDURE — 84439 ASSAY OF FREE THYROXINE: CPT

## 2022-09-02 PROCEDURE — 36415 COLL VENOUS BLD VENIPUNCTURE: CPT

## 2022-09-06 LAB — THYROGLOB AB SERPL-ACNC: <0.9 IU/ML (ref 0–4)

## 2022-11-10 ENCOUNTER — OFFICE VISIT (OUTPATIENT)
Dept: MEDICAL GROUP | Facility: IMAGING CENTER | Age: 42
End: 2022-11-10
Payer: COMMERCIAL

## 2022-11-10 VITALS
DIASTOLIC BLOOD PRESSURE: 80 MMHG | HEART RATE: 91 BPM | OXYGEN SATURATION: 98 % | SYSTOLIC BLOOD PRESSURE: 120 MMHG | BODY MASS INDEX: 26.8 KG/M2 | HEIGHT: 64 IN | WEIGHT: 157 LBS | TEMPERATURE: 99.2 F

## 2022-11-10 DIAGNOSIS — R53.83 MALAISE AND FATIGUE: ICD-10-CM

## 2022-11-10 DIAGNOSIS — D24.1 FIBROADENOMA OF RIGHT BREAST: ICD-10-CM

## 2022-11-10 DIAGNOSIS — Z12.31 ENCOUNTER FOR SCREENING MAMMOGRAM FOR MALIGNANT NEOPLASM OF BREAST: ICD-10-CM

## 2022-11-10 DIAGNOSIS — R53.81 MALAISE AND FATIGUE: ICD-10-CM

## 2022-11-10 DIAGNOSIS — M25.551 RIGHT HIP PAIN: ICD-10-CM

## 2022-11-10 DIAGNOSIS — M16.11 PRIMARY OSTEOARTHRITIS OF RIGHT HIP: ICD-10-CM

## 2022-11-10 DIAGNOSIS — F43.9 STRESS: ICD-10-CM

## 2022-11-10 LAB
EXTERNAL QUALITY CONTROL: NORMAL
FLUAV+FLUBV AG SPEC QL IA: NEGATIVE
INT CON NEG: NORMAL
INT CON NEG: NORMAL
INT CON POS: NORMAL
INT CON POS: NORMAL
SARS-COV+SARS-COV-2 AG RESP QL IA.RAPID: NEGATIVE

## 2022-11-10 PROCEDURE — 87804 INFLUENZA ASSAY W/OPTIC: CPT | Performed by: FAMILY MEDICINE

## 2022-11-10 PROCEDURE — 87426 SARSCOV CORONAVIRUS AG IA: CPT | Performed by: FAMILY MEDICINE

## 2022-11-10 PROCEDURE — 99214 OFFICE O/P EST MOD 30 MIN: CPT | Performed by: FAMILY MEDICINE

## 2022-11-10 RX ORDER — SERTRALINE HYDROCHLORIDE 25 MG/1
25 TABLET, FILM COATED ORAL DAILY
COMMUNITY
End: 2023-01-12 | Stop reason: SDUPTHER

## 2022-11-10 ASSESSMENT — FIBROSIS 4 INDEX: FIB4 SCORE: 0.51

## 2022-11-10 NOTE — PROGRESS NOTES
SUBJECTIVE:    Chief Complaint   Patient presents with    Hip Pain     Right side x 1 year     Requesting Labs     CT scan order    Dizziness    Fatigue       HPI:     Nubia Arce is a 42 y.o. female  with history of hypothyroidism, psoriatic arthritis, seasonal depression, anxiety, pineal gland cyst, meningioma, MTHFR gene mutation and reflux here for follow-up of right hip pain and request for imaging.    Right hip pain symptoms.  More so of right somewhat posterior upper lateral region.  Has had prior SI joint, lumbar and hip x-ray.  Mild symmetric degenerative changes noted on the right side.  PT helped. Difficult to sit crossed legged which continues to some degree.  Continues to have issues.   Right posterior region, feels deep.   Was more active with cardio, possibly helped.   Seeing rheumatology routinely.   Meloxicam and muscle relaxer, not yet tried the muscle relaxer.    States her son tested positive for gene which may be associated with psoriatic arthritis.  His further being evaluated for symptoms and findings on imaging.    States she is feeling off today. A little dizzy and fatigued. Has stress now too with son's issues.  is applying for new job in [place].   After yoga noticed symptoms this morning.  No URI symptom.  Without current URI symptoms, temperature slightly elevated.  Works in elementary school.   Had flu vaccine. Needs covid booster.   Has good social support. Thought about therapy again.     History of fibroadenoma of right breast.  6/2022 with diagnostic mammogram which was otherwise stable.  Recommended to return to routine screening, screening mammogram was December 2021.    ROS:  No recent fevers or chills. No nausea or vomiting. No diarrhea. No chest pains or shortness of breath. No lower extremity edema.    Current Outpatient Medications on File Prior to Visit   Medication Sig Dispense Refill    sertraline (ZOLOFT) 25 MG tablet Take 1 Tablet by mouth every day.       cetirizine (ZYRTEC) 10 MG Tab Take 1 Tablet by mouth every day.      nitrofurantoin (MACROBID) 100 MG Cap Take 1 Capsule by mouth 2 times a day. (Patient not taking: Reported on 7/6/2022) 10 Capsule 0    KAL 0.35 MG tablet Take 1 Tablet by mouth every day.      Ascorbic Acid (VITAMIN C) 1000 MG Tab Take  by mouth.      Turmeric 450 MG Cap Take  by mouth.      Omega-3 Fatty Acids (FISH OIL) 1000 MG Cap capsule Take 1 Capsule by mouth 3 times a day with meals.      sulfamethoxazole-trimethoprim (BACTRIM DS) 800-160 MG tablet Take 1 Tablet by mouth 2 times a day. (Patient not taking: Reported on 7/6/2022) 6 Tablet 0    clobetasol (OLUX) 0.05 % foam Apply once daily to affected area of scalp as needed until scalp is smooth (Patient not taking: Reported on 8/24/2022) 50 g 4     No current facility-administered medications on file prior to visit.       Allergies   Allergen Reactions    Doxycycline Vomiting     2016        Patient Active Problem List    Diagnosis Date Noted    Itching 04/16/2021    Urinary symptom or sign 04/16/2021    Tension-type headache, not intractable 03/26/2020    H. pylori infection 03/26/2020    Dense breast tissue 07/12/2019    MTHFR gene mutation     Breast tenderness in female 01/17/2019    Acquired hypothyroidism 11/07/2018    Iron deficiency anemia due to chronic blood loss 07/27/2018    Dysesthesia 05/25/2018    Meningioma (HCC) 05/25/2018    Lymphadenopathy of head and neck 02/01/2018    Abnormal brain MRI 01/23/2018    Pineal gland cyst 01/23/2018    Dizziness 12/29/2017    Seasonal depression (HCC) 12/29/2017    Numbness and tingling of left side of face 09/05/2017    Decreased sensation of lower extremity 06/30/2017    Gastroesophageal reflux disease 06/30/2017    Neck pain 03/13/2017    Psoriasis 02/09/2017    Chronic LUQ pain 02/09/2017       Past Medical History:   Diagnosis Date    Adenomyosis     aug 2018    Allergy     Anxiety     Depression     Generalized hypermobility of  "joints     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)     Meningioma (HCC)     Jan 2017    MTHFR gene mutation     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    Psoriatic arthritis (HCC)          OBJECTIVE:   /80 (BP Location: Left arm, Patient Position: Sitting, BP Cuff Size: Small adult)   Pulse 91   Temp 37.3 °C (99.2 °F) (Temporal)   Ht 1.626 m (5' 4\")   Wt 71.2 kg (157 lb)   LMP 11/09/2022 (Approximate)   SpO2 98%   BMI 26.95 kg/m²   General: Well-developed well-nourished female, no acute distress  HEENT: oropharynx clear, eyes clear, ears and nose clear  Neck: supple, no lymphadenopathy- cervical or supraclavicular, no thyromegaly  Cardiovascular: regular rate and rhythm, no murmurs, gallops, rubs  Lungs: clear to auscultation bilaterally, no wheezes, crackles, or rhonchi  Abdomen: +bowel sounds, soft, nontender, nondistended, no rebound, no guarding, no hepatosplenomegaly  Extremities: no cyanosis, clubbing, edema.  Adequate range of motion of hips bilaterally.  Right hip-mild tenderness palpation of superior posterior lateral hip region, no pain with axial load or internal rotation, slight discomfort with full external rotation, negative Trendelenburg, negative straight leg raise, popliteal angle of 170 degrees bilaterally.  Skin: Warm and dry  Psych: appropriate mood and affect    POC COVID:negative  POC flu A/B:negative    ASSESSMENT/PLAN:    42 y.o.female  with history of hypothyroidism, psoriatic arthritis, seasonal depression, anxiety, pineal gland cyst, meningioma, MTHFR gene mutation and reflux.       1. Right hip pain-symptoms of right posterior hip region, component of gluteal region noted.  Component may be associated with inflammation or tendinopathy.  Assess further with imaging.  Continue routine exercise as tolerated.  May need to modify activities. MR-HIP-W/O RIGHT      2. Primary osteoarthritis of right hip- as above.        3. Fibroadenoma of right breast -stable on prior imaging.       4. " Encounter for screening mammogram for malignant neoplasm of breast  MA-SCREENING MAMMO BILAT W/TOMOSYNTHESIS W/CAD      5. Malaise and fatigue -may be multifactorial.    Will assess for possible start of viral process.  Continue monitor.  Recommend adequate rest, p.o. intake and hydration.  OTC medications as needed. Follow up as needed if no further improvements or any worsening of symptoms.  POCT SARS-COV Antigen MICHELET (Symptomatic only)    POCT Influenza A/B      6. Stress -recommend consider counseling therapy for self and son due to current stressors.         Follow-up in 2 to 3 months, sooner as needed.    This medical record contains text that has been entered with the assistance of computer voice recognition and dictation software.  Therefore, it may contain unintended errors in text, spelling, punctuation, or grammar.

## 2022-12-05 ENCOUNTER — APPOINTMENT (OUTPATIENT)
Dept: RADIOLOGY | Facility: MEDICAL CENTER | Age: 42
End: 2022-12-05
Attending: FAMILY MEDICINE
Payer: COMMERCIAL

## 2022-12-16 ENCOUNTER — HOSPITAL ENCOUNTER (OUTPATIENT)
Dept: RADIOLOGY | Facility: MEDICAL CENTER | Age: 42
End: 2022-12-16
Attending: FAMILY MEDICINE
Payer: COMMERCIAL

## 2022-12-16 DIAGNOSIS — M25.551 RIGHT HIP PAIN: ICD-10-CM

## 2022-12-16 PROCEDURE — 73721 MRI JNT OF LWR EXTRE W/O DYE: CPT | Mod: RT

## 2023-01-03 ENCOUNTER — HOSPITAL ENCOUNTER (OUTPATIENT)
Dept: RADIOLOGY | Facility: MEDICAL CENTER | Age: 43
End: 2023-01-03
Attending: FAMILY MEDICINE
Payer: COMMERCIAL

## 2023-01-03 DIAGNOSIS — Z12.31 ENCOUNTER FOR SCREENING MAMMOGRAM FOR MALIGNANT NEOPLASM OF BREAST: ICD-10-CM

## 2023-01-03 PROCEDURE — 77063 BREAST TOMOSYNTHESIS BI: CPT

## 2023-03-29 ENCOUNTER — OFFICE VISIT (OUTPATIENT)
Dept: URGENT CARE | Facility: CLINIC | Age: 43
End: 2023-03-29
Payer: COMMERCIAL

## 2023-03-29 ENCOUNTER — HOSPITAL ENCOUNTER (OUTPATIENT)
Facility: MEDICAL CENTER | Age: 43
End: 2023-03-29
Attending: STUDENT IN AN ORGANIZED HEALTH CARE EDUCATION/TRAINING PROGRAM
Payer: COMMERCIAL

## 2023-03-29 VITALS
DIASTOLIC BLOOD PRESSURE: 70 MMHG | TEMPERATURE: 98.6 F | RESPIRATION RATE: 16 BRPM | HEIGHT: 64 IN | BODY MASS INDEX: 26.46 KG/M2 | OXYGEN SATURATION: 97 % | HEART RATE: 100 BPM | WEIGHT: 155 LBS | SYSTOLIC BLOOD PRESSURE: 126 MMHG

## 2023-03-29 DIAGNOSIS — R30.0 DYSURIA: ICD-10-CM

## 2023-03-29 LAB
APPEARANCE UR: NORMAL
BILIRUB UR STRIP-MCNC: NEGATIVE MG/DL
COLOR UR AUTO: YELLOW
GLUCOSE UR STRIP.AUTO-MCNC: NEGATIVE MG/DL
KETONES UR STRIP.AUTO-MCNC: NEGATIVE MG/DL
LEUKOCYTE ESTERASE UR QL STRIP.AUTO: NORMAL
NITRITE UR QL STRIP.AUTO: NEGATIVE
PH UR STRIP.AUTO: 6 [PH] (ref 5–8)
PROT UR QL STRIP: NEGATIVE MG/DL
RBC UR QL AUTO: NORMAL
SP GR UR STRIP.AUTO: 1
UROBILINOGEN UR STRIP-MCNC: 0.2 MG/DL

## 2023-03-29 PROCEDURE — 87186 SC STD MICRODIL/AGAR DIL: CPT

## 2023-03-29 PROCEDURE — 87077 CULTURE AEROBIC IDENTIFY: CPT

## 2023-03-29 PROCEDURE — 81002 URINALYSIS NONAUTO W/O SCOPE: CPT | Performed by: STUDENT IN AN ORGANIZED HEALTH CARE EDUCATION/TRAINING PROGRAM

## 2023-03-29 PROCEDURE — 87660 TRICHOMONAS VAGIN DIR PROBE: CPT

## 2023-03-29 PROCEDURE — 87086 URINE CULTURE/COLONY COUNT: CPT

## 2023-03-29 PROCEDURE — 99213 OFFICE O/P EST LOW 20 MIN: CPT | Mod: 25 | Performed by: STUDENT IN AN ORGANIZED HEALTH CARE EDUCATION/TRAINING PROGRAM

## 2023-03-29 PROCEDURE — 87480 CANDIDA DNA DIR PROBE: CPT

## 2023-03-29 PROCEDURE — 87510 GARDNER VAG DNA DIR PROBE: CPT

## 2023-03-29 RX ORDER — NITROFURANTOIN 25; 75 MG/1; MG/1
100 CAPSULE ORAL 2 TIMES DAILY
Qty: 10 CAPSULE | Refills: 0 | Status: SHIPPED | OUTPATIENT
Start: 2023-03-29 | End: 2023-04-03

## 2023-03-29 ASSESSMENT — FIBROSIS 4 INDEX: FIB4 SCORE: 0.51

## 2023-03-30 DIAGNOSIS — R30.0 DYSURIA: ICD-10-CM

## 2023-03-30 NOTE — PROGRESS NOTES
Subjective:   CHIEF COMPLAINT  Chief Complaint   Patient presents with    Urinary Frequency     And pain, lower back pain x2 days possible tampon stuck inside        HPI  Nubia Arce is a 42 y.o. female is here for concerns for possible bladder infection.  For the last 2 days the patient's been experiencing urinary frequency and dysuria.  Says she has had UTIs in the past and believes her current symptoms are consistent with her previous UTIs.  She has not taken any medication for symptomatic relief.  Reports some vaginal odor today but no discharge or pruritus.  Says last night she also felt like her bladder was spasming.  Patient is reports she is experiencing bilateral low back pain for several weeks.  Says she typically develops back pain with getting urinary infections.  She has not been experiencing any fevers or chills.  No nausea or vomiting.  No systemic symptoms.    Patient also like to be checked for retained tampon.  She completed her menstrual cycle proximally 10 days ago and does not recall removing the tampon.    REVIEW OF SYSTEMS  General: no fever or chills  GI: no nausea or vomiting  See HPI for further details.    PAST MEDICAL HISTORY  Patient Active Problem List    Diagnosis Date Noted    Itching 04/16/2021    Urinary symptom or sign 04/16/2021    Tension-type headache, not intractable 03/26/2020    H. pylori infection 03/26/2020    Dense breast tissue 07/12/2019    MTHFR gene mutation     Breast tenderness in female 01/17/2019    Acquired hypothyroidism 11/07/2018    Iron deficiency anemia due to chronic blood loss 07/27/2018    Dysesthesia 05/25/2018    Meningioma (HCC) 05/25/2018    Lymphadenopathy of head and neck 02/01/2018    Abnormal brain MRI 01/23/2018    Pineal gland cyst 01/23/2018    Dizziness 12/29/2017    Seasonal depression (HCC) 12/29/2017    Numbness and tingling of left side of face 09/05/2017    Decreased sensation of lower extremity 06/30/2017    Gastroesophageal  reflux disease 06/30/2017    Neck pain 03/13/2017    Psoriasis 02/09/2017    Chronic LUQ pain 02/09/2017       SURGICAL HISTORY   has a past surgical history that includes primary c section and lumpectomy (1996).    ALLERGIES  Allergies   Allergen Reactions    Doxycycline Vomiting     2016        CURRENT MEDICATIONS  Home Medications       Reviewed by Raul Cabrera D.O. (Physician) on 04/01/23 at 1513  Med List Status: <None>     Medication Last Dose Status   Ascorbic Acid (VITAMIN C PO)  Active   Ascorbic Acid (VITAMIN C) 1000 MG Tab  Active   cetirizine (ZYRTEC) 10 MG Tab Taking Active   clobetasol (OLUX) 0.05 % foam  Active   KAL 0.35 MG tablet Taking Active   Ferrous Gluconate-C-Folic Acid (IRON-C PO)  Active   Fish Oil-Cholecalciferol (FISH OIL + D3 PO)  Active   Ibuprofen (ADVIL PO)  Active   Methylcobalamin (B12-ACTIVE PO)  Active   nitrofurantoin (MACROBID) 100 MG Cap  Active   nitrofurantoin (MACROBID) 100 MG Cap  Active   nystatin (MYCOSTATIN) 616443 UNIT/ML Suspension  Active   Omega-3 Fatty Acids (FISH OIL) 1000 MG Cap capsule  Active   OMEPRAZOLE PO  Active   sertraline (ZOLOFT) 25 MG tablet  Active   Sertraline HCl (ZOLOFT PO) Taking Active   sulfamethoxazole-trimethoprim (BACTRIM DS) 800-160 MG tablet  Active   Turmeric 450 MG Cap  Active   TURMERIC CURCUMIN PO  Active                    SOCIAL HISTORY  Social History     Tobacco Use    Smoking status: Never    Smokeless tobacco: Never   Vaping Use    Vaping Use: Never used   Substance and Sexual Activity    Alcohol use: Yes     Alcohol/week: 1.8 - 3.6 oz     Types: 3 - 6 Standard drinks or equivalent per week    Drug use: No     Comment: cbd oil prn    Sexual activity: Yes     Partners: Male     Birth control/protection: Condom       FAMILY HISTORY  Family History   Problem Relation Age of Onset    Psychiatric Illness Mother         depression    Hypertension Mother     Arthritis Father         psoriatic    Hypertension Father     Psychiatric  "Illness Father         depression    Cancer Maternal Grandmother     Cancer Maternal Grandfather         stomach    Other Son         speech apraxia          Objective:   PHYSICAL EXAM  VITAL SIGNS: /70 (BP Location: Left arm, Patient Position: Sitting, BP Cuff Size: Adult)   Pulse 100   Temp 37 °C (98.6 °F) (Temporal)   Resp 16   Ht 1.626 m (5' 4\")   Wt 70.3 kg (155 lb)   SpO2 97%   BMI 26.61 kg/m²     Gen: no acute distress, normal voice  Skin: dry, intact, moist mucosal membranes  Eyes: No conjunctival injection b/l  Neck: Normal range of motion. No meningeal signs.   Lungs: No increased work of breathing.  CTAB w/ symmetric expansion  CV: RRR w/o murmurs or clicks  Genitalia: No lesions or ulcerations on labia. Cervix appears normal, very non-friable, nominal discharge from vaginal vault.  Female chaperone was present.  Psych: normal affect, normal judgement, alert, awake    UA: Presence of WBCs and RBCs.  No nitrates.    Assessment/Plan:     1. Dysuria  POCT Urinalysis    URINE CULTURE(NEW)    VAGINAL PATHOGENS DNA PANEL    nitrofurantoin (MACROBID) 100 MG Cap      Suspect symptoms secondary to an acute cystitis.  GYN exam was performed which was unremarkable; no retained tampon.  - Ordered Rx for Macrobid  - Ordered urine culture  - Ordered vaginal pathogen swab.  Contact patient at [phone] only if changes are needed in medications.  Otherwise results will be viewed through [facility].  - Return to urgent care any new/worsening symptoms or further questions or concerns.  Patient understood everything discussed.  All questions were answered.      Differential diagnosis, natural history, supportive care, and indications for immediate follow-up discussed. All questions answered. Patient agrees with the plan of care.    Follow-up as needed if symptoms worsen or fail to improve to PCP, Urgent care or Emergency Room.    Please note that this dictation was created using voice recognition software. I " have made a reasonable attempt to correct obvious errors, but I expect that there are errors of grammar and possibly content that I did not discover before finalizing the note.

## 2023-06-01 ENCOUNTER — HOSPITAL ENCOUNTER (OUTPATIENT)
Facility: MEDICAL CENTER | Age: 43
End: 2023-06-01
Attending: STUDENT IN AN ORGANIZED HEALTH CARE EDUCATION/TRAINING PROGRAM
Payer: COMMERCIAL

## 2023-06-01 PROCEDURE — 87086 URINE CULTURE/COLONY COUNT: CPT

## 2023-06-03 LAB
BACTERIA UR CULT: NORMAL
SIGNIFICANT IND 70042: NORMAL
SITE SITE: NORMAL
SOURCE SOURCE: NORMAL

## 2023-06-13 ENCOUNTER — HOSPITAL ENCOUNTER (OUTPATIENT)
Dept: LAB | Facility: MEDICAL CENTER | Age: 43
End: 2023-06-13
Attending: NURSE PRACTITIONER
Payer: COMMERCIAL

## 2023-06-13 LAB
25(OH)D3 SERPL-MCNC: 43 NG/ML (ref 30–100)
ALBUMIN SERPL BCP-MCNC: 4.1 G/DL (ref 3.2–4.9)
ALBUMIN/GLOB SERPL: 1.7 G/DL
ALP SERPL-CCNC: 38 U/L (ref 30–99)
ALT SERPL-CCNC: 15 U/L (ref 2–50)
ANION GAP SERPL CALC-SCNC: 11 MMOL/L (ref 7–16)
AST SERPL-CCNC: 13 U/L (ref 12–45)
BILIRUB SERPL-MCNC: 0.3 MG/DL (ref 0.1–1.5)
BUN SERPL-MCNC: 11 MG/DL (ref 8–22)
CALCIUM ALBUM COR SERPL-MCNC: 8.8 MG/DL (ref 8.5–10.5)
CALCIUM SERPL-MCNC: 8.9 MG/DL (ref 8.5–10.5)
CHLORIDE SERPL-SCNC: 108 MMOL/L (ref 96–112)
CHOLEST SERPL-MCNC: 160 MG/DL (ref 100–199)
CO2 SERPL-SCNC: 24 MMOL/L (ref 20–33)
CREAT SERPL-MCNC: 0.69 MG/DL (ref 0.5–1.4)
EST. AVERAGE GLUCOSE BLD GHB EST-MCNC: 108 MG/DL
ESTRADIOL SERPL-MCNC: 67.2 PG/ML
FASTING STATUS PATIENT QL REPORTED: NORMAL
FERRITIN SERPL-MCNC: 15.4 NG/ML (ref 10–291)
FOLATE SERPL-MCNC: 13 NG/ML
GFR SERPLBLD CREATININE-BSD FMLA CKD-EPI: 110 ML/MIN/1.73 M 2
GLOBULIN SER CALC-MCNC: 2.4 G/DL (ref 1.9–3.5)
GLUCOSE SERPL-MCNC: 107 MG/DL (ref 65–99)
HBA1C MFR BLD: 5.4 % (ref 4–5.6)
HDLC SERPL-MCNC: 49 MG/DL
IRON SATN MFR SERPL: 25 % (ref 15–55)
IRON SERPL-MCNC: 90 UG/DL (ref 40–170)
LDLC SERPL CALC-MCNC: 89 MG/DL
POTASSIUM SERPL-SCNC: 3.8 MMOL/L (ref 3.6–5.5)
PROGEST SERPL-MCNC: 0.43 NG/ML
PROT SERPL-MCNC: 6.5 G/DL (ref 6–8.2)
SODIUM SERPL-SCNC: 143 MMOL/L (ref 135–145)
T3FREE SERPL-MCNC: 2.99 PG/ML (ref 2–4.4)
T4 FREE SERPL-MCNC: 1.55 NG/DL (ref 0.93–1.7)
TESTOST SERPL-MCNC: 43 NG/DL (ref 9–75)
THYROPEROXIDASE AB SERPL-ACNC: <9 IU/ML (ref 0–9)
TIBC SERPL-MCNC: 356 UG/DL (ref 250–450)
TRIGL SERPL-MCNC: 112 MG/DL (ref 0–149)
TSH SERPL DL<=0.005 MIU/L-ACNC: 1.93 UIU/ML (ref 0.38–5.33)
UIBC SERPL-MCNC: 266 UG/DL (ref 110–370)
VIT B12 SERPL-MCNC: 474 PG/ML (ref 211–911)

## 2023-06-13 PROCEDURE — 80053 COMPREHEN METABOLIC PANEL: CPT

## 2023-06-13 PROCEDURE — 86800 THYROGLOBULIN ANTIBODY: CPT

## 2023-06-13 PROCEDURE — 83550 IRON BINDING TEST: CPT

## 2023-06-13 PROCEDURE — 84439 ASSAY OF FREE THYROXINE: CPT

## 2023-06-13 PROCEDURE — 82626 DEHYDROEPIANDROSTERONE: CPT

## 2023-06-13 PROCEDURE — 86376 MICROSOMAL ANTIBODY EACH: CPT

## 2023-06-13 PROCEDURE — 36415 COLL VENOUS BLD VENIPUNCTURE: CPT

## 2023-06-13 PROCEDURE — 84403 ASSAY OF TOTAL TESTOSTERONE: CPT

## 2023-06-13 PROCEDURE — 83036 HEMOGLOBIN GLYCOSYLATED A1C: CPT

## 2023-06-13 PROCEDURE — 84481 FREE ASSAY (FT-3): CPT

## 2023-06-13 PROCEDURE — 83540 ASSAY OF IRON: CPT

## 2023-06-13 PROCEDURE — 82670 ASSAY OF TOTAL ESTRADIOL: CPT

## 2023-06-13 PROCEDURE — 80061 LIPID PANEL: CPT

## 2023-06-13 PROCEDURE — 82728 ASSAY OF FERRITIN: CPT

## 2023-06-13 PROCEDURE — 84443 ASSAY THYROID STIM HORMONE: CPT

## 2023-06-13 PROCEDURE — 82607 VITAMIN B-12: CPT

## 2023-06-13 PROCEDURE — 84144 ASSAY OF PROGESTERONE: CPT

## 2023-06-13 PROCEDURE — 82746 ASSAY OF FOLIC ACID SERUM: CPT

## 2023-06-13 PROCEDURE — 82306 VITAMIN D 25 HYDROXY: CPT

## 2023-06-14 LAB
THYROGLOB AB SERPL-ACNC: <0.9 IU/ML (ref 0–4)
THYROPEROXIDASE AB SERPL-ACNC: 0.3 IU/ML (ref 0–9)

## 2023-06-16 LAB — DHEA SERPL-MCNC: 8.18 NG/ML (ref 0.63–4.7)

## 2023-09-29 ENCOUNTER — HOSPITAL ENCOUNTER (OUTPATIENT)
Facility: MEDICAL CENTER | Age: 43
End: 2023-09-29
Attending: STUDENT IN AN ORGANIZED HEALTH CARE EDUCATION/TRAINING PROGRAM
Payer: COMMERCIAL

## 2023-09-29 PROCEDURE — 87086 URINE CULTURE/COLONY COUNT: CPT

## 2023-10-01 ENCOUNTER — OFFICE VISIT (OUTPATIENT)
Dept: URGENT CARE | Facility: CLINIC | Age: 43
End: 2023-10-01
Payer: COMMERCIAL

## 2023-10-01 VITALS
WEIGHT: 160 LBS | DIASTOLIC BLOOD PRESSURE: 60 MMHG | SYSTOLIC BLOOD PRESSURE: 112 MMHG | HEART RATE: 100 BPM | RESPIRATION RATE: 16 BRPM | BODY MASS INDEX: 27.31 KG/M2 | HEIGHT: 64 IN | TEMPERATURE: 98.2 F | OXYGEN SATURATION: 100 %

## 2023-10-01 DIAGNOSIS — H66.91 ACUTE RIGHT OTITIS MEDIA: ICD-10-CM

## 2023-10-01 DIAGNOSIS — J01.00 ACUTE NON-RECURRENT MAXILLARY SINUSITIS: ICD-10-CM

## 2023-10-01 LAB
BACTERIA UR CULT: NORMAL
SIGNIFICANT IND 70042: NORMAL
SITE SITE: NORMAL
SOURCE SOURCE: NORMAL

## 2023-10-01 PROCEDURE — 3074F SYST BP LT 130 MM HG: CPT | Performed by: NURSE PRACTITIONER

## 2023-10-01 PROCEDURE — 99214 OFFICE O/P EST MOD 30 MIN: CPT | Performed by: NURSE PRACTITIONER

## 2023-10-01 PROCEDURE — 3078F DIAST BP <80 MM HG: CPT | Performed by: NURSE PRACTITIONER

## 2023-10-01 RX ORDER — AMOXICILLIN AND CLAVULANATE POTASSIUM 875; 125 MG/1; MG/1
1 TABLET, FILM COATED ORAL 2 TIMES DAILY
Qty: 14 TABLET | Refills: 0 | Status: SHIPPED | OUTPATIENT
Start: 2023-10-01 | End: 2023-10-08

## 2023-10-01 RX ORDER — VALACYCLOVIR HYDROCHLORIDE 1 G/1
1000 TABLET, FILM COATED ORAL 3 TIMES DAILY
COMMUNITY
Start: 2023-08-11 | End: 2023-12-22

## 2023-10-01 ASSESSMENT — FIBROSIS 4 INDEX: FIB4 SCORE: 0.47

## 2023-10-01 NOTE — PROGRESS NOTES
Chief Complaint   Patient presents with    Cough     X7 days      Negative at home COVID test    Nasal Congestion       HISTORY OF PRESENT ILLNESS: Patient is a pleasant 42 y.o. female who presents today due to one week of nasal congestion, tactile fever, cough, ear pain, headache, and sinus pressure. She denies associated difficulty breathing, confusion, nausea, vomiting or diarrhea. She has tried OTC cold/sinus medication at home without much improvement. No recent antibiotic usage.      Patient Active Problem List    Diagnosis Date Noted    Itching 04/16/2021    Urinary symptom or sign 04/16/2021    Tension-type headache, not intractable 03/26/2020    H. pylori infection 03/26/2020    Dense breast tissue 07/12/2019    MTHFR gene mutation     Breast tenderness in female 01/17/2019    Acquired hypothyroidism 11/07/2018    Iron deficiency anemia due to chronic blood loss 07/27/2018    Dysesthesia 05/25/2018    Meningioma (HCC) 05/25/2018    Lymphadenopathy of head and neck 02/01/2018    Abnormal brain MRI 01/23/2018    Pineal gland cyst 01/23/2018    Dizziness 12/29/2017    Seasonal depression (HCC) 12/29/2017    Numbness and tingling of left side of face 09/05/2017    Decreased sensation of lower extremity 06/30/2017    Gastroesophageal reflux disease 06/30/2017    Neck pain 03/13/2017    Psoriasis 02/09/2017    Chronic LUQ pain 02/09/2017       Allergies:Doxycycline    Current Outpatient Medications Ordered in Epic   Medication Sig Dispense Refill    valacyclovir (VALTREX) 1 GM Tab Take 1,000 mg by mouth 3 times a day.      amoxicillin-clavulanate (AUGMENTIN) 875-125 MG Tab Take 1 Tablet by mouth 2 times a day for 7 days. 14 Tablet 0    celecoxib (CELEBREX) 200 MG Cap Take 1 Capsule by mouth 2 times a day for 30 days. 60 Capsule 0    acyclovir (ZOVIRAX) 400 MG tablet Take 1 Tablet by mouth 3 times a day. Take at first sign of symptoms for 5 days. 15 Tablet 1    sertraline (ZOLOFT) 25 MG tablet Take 1 Tablet by  mouth every day. 90 Tablet 0    clobetasol (OLUX) 0.05 % foam APPLY ONCE DAILY TO AFFECTED AREA OF SCALP AS NEEDED UNTIL SCALP IS SMOOTH 50 g 0    azelastine (ASTELIN) 137 MCG/SPRAY nasal spray USE 1 SPRAY IN EACH NOSTRIL AT BEDTIME      nystatin (MYCOSTATIN) 231908 UNIT/ML Suspension Take 5 mL by mouth 4 times a day. Swish and spit. Use for one week or until symptoms have resolved. 473 mL 0    Ibuprofen (ADVIL PO) Advil      Methylcobalamin (B12-ACTIVE PO) B12-Active      Fish Oil-Cholecalciferol (FISH OIL + D3 PO) Fish Oil      Ferrous Gluconate-C-Folic Acid (IRON-C PO) iron      OMEPRAZOLE PO Omeprazole      TURMERIC CURCUMIN PO Turmeric      Ascorbic Acid (VITAMIN C PO) Vitamin C      cetirizine (ZYRTEC) 10 MG Tab Take 1 Tablet by mouth every day.      KAL 0.35 MG tablet Take 1 Tablet by mouth every day.      Ascorbic Acid (VITAMIN C) 1000 MG Tab Take  by mouth.      Turmeric 450 MG Cap Take  by mouth.      Omega-3 Fatty Acids (FISH OIL) 1000 MG Cap capsule Take 1 Capsule by mouth 3 times a day with meals.       No current Epic-ordered facility-administered medications on file.       Past Medical History:   Diagnosis Date    Adenomyosis     aug 2018    Allergy     Anxiety     Depression     Generalized hypermobility of joints     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)     Meningioma (HCC)     2017    MTHFR gene mutation     Pineal gland cyst 2017    Psoriatic arthritis ([facility])        Social History     Tobacco Use    Smoking status: Never    Smokeless tobacco: Never   Vaping Use    Vaping Use: Never used   Substance Use Topics    Alcohol use: Yes     Alcohol/week: 1.8 - 3.6 oz     Types: 3 - 6 Standard drinks or equivalent per week    Drug use: No     Comment: cbd oil prn       Family Status   Relation Name Status    Mo  Alive    Fa      MGMo  (Not Specified)    MGFa  (Not Specified)    Bro  Alive    Son  Alive     Family History   Problem Relation Age of Onset    Psychiatric Illness Mother   "       depression    Hypertension Mother     Arthritis Father         psoriatic    Hypertension Father     Psychiatric Illness Father         depression    Cancer Maternal Grandmother     Cancer Maternal Grandfather         stomach    Other Son         speech apraxia       ROS:  Review of Systems   Constitutional: Positive for fever, chills, fatigue. Negative for weight loss.   HENT: Positive for ear pain, sinus pressure, sore throat, nasal congestion. Negative for nosebleeds, neck pain.    Eyes: Negative for vision changes.   Neuro: Positive for headache. Negative for sensory changes, weakness, seizure, LOC.  Cardiovascular: Negative for chest pain, palpitations, orthopnea and leg swelling.   Respiratory: Negative for cough, sputum production, shortness of breath and wheezing.   Gastrointestinal: Negative for abdominal pain, nausea, vomiting or diarrhea.    Skin: Negative for rash, diaphoresis.     Exam:  /60 (BP Location: Left arm, Patient Position: Sitting, BP Cuff Size: Adult)   Pulse 100   Temp 36.8 °C (98.2 °F) (Temporal)   Resp 16   Ht 1.626 m (5' 4\")   Wt 72.6 kg (160 lb)   SpO2 100%   General: well-nourished, well-developed female in NAD  Head: normocephalic, atraumatic  Eyes: PERRLA, no conjunctival injection, acuity grossly intact, lids normal.  Ears: normal shape and symmetry, no tenderness, no discharge. External canals are without any significant edema or erythema. Left tympanic membrane is without any inflammation, no effusion.  Right tympanic membrane is erythematous, injected, intact.  Gross auditory acuity is intact.  Nose: symmetrical without tenderness, erythema and swelling noted bilateral turbinates, clear discharge. Right +maxillary sinus tenderness.   Mouth/Throat: reasonable hygiene, no exudates or tonsillar enlargement. Erythema is present.   Neck: no masses, range of motion within normal limits, no tracheal deviation. No obvious thyroid enlargement.   Lymph: no cervical " adenopathy. No supraclavicular adenopathy.   Neuro: alert and oriented. Cranial nerves 1-12 grossly intact. No sensory deficit.   Cardiovascular: regular rate and rhythm. No edema.  Pulmonary: no distress. Chest is symmetrical with respiration, no wheezes, crackles, or rhonchi.   Musculoskeletal: no clubbing, appropriate muscle tone, gait is stable.  Skin: warm, dry, intact, no clubbing, no cyanosis, no rashes.   Psych: appropriate mood, affect, judgement.         Assessment/Plan:  1. Acute right otitis media  amoxicillin-clavulanate (AUGMENTIN) 875-125 MG Tab      2. Acute non-recurrent maxillary sinusitis  amoxicillin-clavulanate (AUGMENTIN) 875-125 MG Tab            Antibiotic as directed, potential side effects of medication discussed. Flonase as directed.   Sleep with HOB elevated, humidifier at night, rest, increase fluid intake.   Supportive care, differential diagnoses, and indications for immediate follow-up discussed with patient.   Pathogenesis of diagnosis discussed including typical length and natural progression.   Instructed to return to clinic or nearest emergency department for any change in condition, further concerns, or worsening of symptoms.  Patient states understanding of the plan of care and discharge instructions.  Instructed to make an appointment, for follow up, with her primary care provider.        Please note that this dictation was created using voice recognition software. I have made every reasonable attempt to correct obvious errors, but I expect that there are errors of grammar and possibly content that I did not discover before finalizing the note.       HERBER Rogers.

## 2023-12-07 ENCOUNTER — OFFICE VISIT (OUTPATIENT)
Dept: MEDICAL GROUP | Facility: IMAGING CENTER | Age: 43
End: 2023-12-07
Payer: COMMERCIAL

## 2023-12-07 VITALS
HEIGHT: 64 IN | DIASTOLIC BLOOD PRESSURE: 88 MMHG | SYSTOLIC BLOOD PRESSURE: 140 MMHG | TEMPERATURE: 97.8 F | WEIGHT: 166 LBS | OXYGEN SATURATION: 98 % | BODY MASS INDEX: 28.34 KG/M2 | RESPIRATION RATE: 16 BRPM | HEART RATE: 66 BPM

## 2023-12-07 DIAGNOSIS — R03.0 ELEVATED BLOOD PRESSURE READING: ICD-10-CM

## 2023-12-07 DIAGNOSIS — N94.0 OVULATION PAIN: ICD-10-CM

## 2023-12-07 DIAGNOSIS — R07.81 RIB PAIN ON LEFT SIDE: ICD-10-CM

## 2023-12-07 DIAGNOSIS — F43.0 STRESS REACTION: ICD-10-CM

## 2023-12-07 DIAGNOSIS — Z87.42 HISTORY OF OVARIAN CYST: ICD-10-CM

## 2023-12-07 DIAGNOSIS — F41.8 ANXIETY ABOUT HEALTH: ICD-10-CM

## 2023-12-07 DIAGNOSIS — R92.30 DENSE BREAST TISSUE: ICD-10-CM

## 2023-12-07 DIAGNOSIS — R35.0 FREQUENCY OF URINATION: ICD-10-CM

## 2023-12-07 DIAGNOSIS — R10.2 PELVIC PAIN: ICD-10-CM

## 2023-12-07 PROCEDURE — 81002 URINALYSIS NONAUTO W/O SCOPE: CPT | Performed by: FAMILY MEDICINE

## 2023-12-07 PROCEDURE — 3077F SYST BP >= 140 MM HG: CPT | Performed by: FAMILY MEDICINE

## 2023-12-07 PROCEDURE — 99214 OFFICE O/P EST MOD 30 MIN: CPT | Performed by: FAMILY MEDICINE

## 2023-12-07 PROCEDURE — 3079F DIAST BP 80-89 MM HG: CPT | Performed by: FAMILY MEDICINE

## 2023-12-07 RX ORDER — CITALOPRAM HYDROBROMIDE 10 MG/1
10 TABLET ORAL DAILY
Qty: 30 TABLET | Refills: 3 | Status: SHIPPED | OUTPATIENT
Start: 2023-12-07 | End: 2024-01-24

## 2023-12-07 ASSESSMENT — PAIN SCALES - GENERAL: PAINLEVEL: NO PAIN

## 2023-12-07 ASSESSMENT — FIBROSIS 4 INDEX: FIB4 SCORE: 0.48

## 2023-12-07 NOTE — PROGRESS NOTES
SUBJECTIVE:    Chief Complaint   Patient presents with    Breast Problem     Pt states she had abnormal imaging before and had biopsy with dense breast tissue, would like fv     Ovarian Cyst     Hx of cyst on the left side     Abdominal Pain     Pt states she feels pressure under her ribs, she does have frequency using the restroom     Medication Management     Zoloft        HPI:     Nubia Arce is a 43 y.o. female here to review medical issues.     Frequency- urination. Has been ongoing issue. Has been seeing urology- has not completed urodynamic study yet, set for future.   Pressure of area. No blood in urine or burning.     Has been evaluated by OB/GYN- ovarian cyst on left side. Painful ovulation. Was on progesterone for  a while, now off, still feels it. 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 Pelvic exam done, had some tenderness of area.      LUQ- front/back pressure. Left side area.   2018- abd CT done. .  2020- abd u/s- normal.   Has not tried PT.   Arthritis- could affect as well.      Feeling overwhelmed-Needs zoloft increased.   Trying to keep up with everything.   Son- arthritis and rare bone disease this year.   Gaining weight, BP.   Hurts to exercise.  's father passed away recently.   Tends to worry about health.  Next week- starting with counseling.   Reminders- social.   Zoloft on and off.     Breast area- right side biopsy 2 years ago.   Mammogram every 6 months then, now every year. U/s breast done.   Dense breast tissue.  Negative family history breast cancer.   Mammogram is scheduled for December, will await mammogram.       ROS:  No recent fevers or chills. No nausea or vomiting. No diarrhea. No chest pains or shortness of breath. No lower extremity edema.    Current Outpatient Medications on File Prior to Visit   Medication Sig Dispense Refill    acyclovir (ZOVIRAX) 400 MG tablet Take 1 Tablet by mouth 3 times a day. Take at  first sign of symptoms for 5 days. 15 Tablet 1    sertraline (ZOLOFT) 25 MG tablet Take 1 Tablet by mouth every day. 90 Tablet 0    clobetasol (OLUX) 0.05 % foam APPLY ONCE DAILY TO AFFECTED AREA OF SCALP AS NEEDED UNTIL SCALP IS SMOOTH 50 g 0    azelastine (ASTELIN) 137 MCG/SPRAY nasal spray USE 1 SPRAY IN EACH NOSTRIL AT BEDTIME      Ibuprofen (ADVIL PO) Advil      Methylcobalamin (B12-ACTIVE PO) B12-Active      Fish Oil-Cholecalciferol (FISH OIL + D3 PO) Fish Oil      cetirizine (ZYRTEC) 10 MG Tab Take 1 Tablet by mouth every day.      Ascorbic Acid (VITAMIN C) 1000 MG Tab Take  by mouth.      Turmeric 450 MG Cap Take  by mouth.      Omega-3 Fatty Acids (FISH OIL) 1000 MG Cap capsule Take 1 Capsule by mouth 3 times a day with meals.      valacyclovir (VALTREX) 1 GM Tab Take 1,000 mg by mouth 3 times a day.      nystatin (MYCOSTATIN) 252050 UNIT/ML Suspension Take 5 mL by mouth 4 times a day. Swish and spit. Use for one week or until symptoms have resolved. 473 mL 0    OMEPRAZOLE PO Omeprazole       No current facility-administered medications on file prior to visit.       Allergies   Allergen Reactions    Doxycycline Vomiting     2016        Patient Active Problem List    Diagnosis Date Noted    Itching 04/16/2021    Urinary symptom or sign 04/16/2021    Tension-type headache, not intractable 03/26/2020    H. pylori infection 03/26/2020    Dense breast tissue 07/12/2019    MTHFR gene mutation     Breast tenderness in female 01/17/2019    Acquired hypothyroidism 11/07/2018    Iron deficiency anemia due to chronic blood loss 07/27/2018    Dysesthesia 05/25/2018    Meningioma (HCC) 05/25/2018    Lymphadenopathy of head and neck 02/01/2018    Abnormal brain MRI 01/23/2018    Pineal gland cyst 01/23/2018    Dizziness 12/29/2017    Seasonal depression (HCC) 12/29/2017    Numbness and tingling of left side of face 09/05/2017    Decreased sensation of lower extremity 06/30/2017    Gastroesophageal reflux disease  "06/30/2017    Neck pain 03/13/2017    Psoriasis 02/09/2017    Chronic LUQ pain 02/09/2017       Past Medical History:   Diagnosis Date    Adenomyosis     aug 2018    Allergy     Anxiety     Depression     Generalized hypermobility of joints     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)     Meningioma (HCC)     Jan 2017    MTHFR gene mutation     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    Psoriatic arthritis (HCC)          OBJECTIVE:   BP (!) 140/88 (BP Location: Left arm, Patient Position: Sitting, BP Cuff Size: Adult)   Pulse 66   Temp 36.6 °C (97.8 °F) (Temporal)   Resp 16   Ht 1.626 m (5' 4\")   Wt 75.3 kg (166 lb)   LMP 11/23/2023 (Approximate)   SpO2 98%   BMI 28.49 kg/m²   General: Well-developed well-nourished female, no acute distress  Neck: supple, no lymphadenopathy- cervical or supraclavicular, no thyromegaly  Cardiovascular: regular rate and rhythm, no murmurs, gallops, rubs  Lungs: clear to auscultation bilaterally, no wheezes, crackles, or rhonchi  Abdomen: +bowel sounds, soft, nontender, nondistended, no rebound, no guarding, no hepatosplenomegaly, left side rib region with slight TTP  Extremities: no cyanosis, clubbing, edema  Skin: Warm and dry  Psych: appropriate mood and affect     Latest Reference Range & Units 12/07/23 15:02   POC Color Negative  yellow   POC Appearance Negative  clear   POC Specific Gravity <1.005 - >1.030  1.005   POC Urine PH 5.0 - 8.0  6.0   POC Glucose Negative mg/dL negative   POC Ketones Negative mg/dL negative   POC Protein Negative mg/dL negative   POC Nitrites Negative  negative   POC Leukocyte Esterase Negative  negative   POC Blood Negative  negative   POC Bilirubin Negative mg/dL negative   POC Urobiligen Negative (0.2) mg/dL 0.2       ASSESSMENT/PLAN:    43 y.o.female     1. Frequency of urination- UA negative.  Has been seeing urology. Consider component due to overactive bladder.   Will assess pelvic imaging as below.   Follow up with urology. Monitor.  POCT Urinalysis "      2. Rib pain on left side   Modify activities. Refer to physical therapy.  Referral to Physical Therapy      3. Pelvic pain-has had prior pelvic exam. Hx of ovarian cyst and ovulation pain.   Will obtain further imaging.   Continue follow up with gynecology.  US-PELVIC COMPLETE (TRANSABDOMINAL/TRANSVAGINAL) (COMBO)      4. Ovulation pain        5. History of ovarian cyst        6. Anxiety about health   Will switch to celexa 10 mg daily.   Monitor. Continue with counseling therapy.  citalopram (CELEXA) 10 MG tablet      7. Stress reaction - as above.  citalopram (CELEXA) 10 MG tablet      8. Dense breast tissue   Monitor screening imaging.          9. Elevated blood pressure reading - mild.   Will monitor at this time.            Return in about 6 weeks (around 1/18/2024).    This medical record contains text that has been entered with the assistance of computer voice recognition and dictation software.  Therefore, it may contain unintended errors in text, spelling, punctuation, or grammar.

## 2023-12-22 ENCOUNTER — HOSPITAL ENCOUNTER (OUTPATIENT)
Dept: RADIOLOGY | Facility: MEDICAL CENTER | Age: 43
End: 2023-12-22

## 2023-12-22 ENCOUNTER — OFFICE VISIT (OUTPATIENT)
Dept: URGENT CARE | Facility: CLINIC | Age: 43
End: 2023-12-22
Payer: COMMERCIAL

## 2023-12-22 VITALS
RESPIRATION RATE: 14 BRPM | WEIGHT: 167 LBS | DIASTOLIC BLOOD PRESSURE: 80 MMHG | BODY MASS INDEX: 28.51 KG/M2 | SYSTOLIC BLOOD PRESSURE: 124 MMHG | HEIGHT: 64 IN | TEMPERATURE: 97.3 F | HEART RATE: 86 BPM | OXYGEN SATURATION: 98 %

## 2023-12-22 DIAGNOSIS — H66.001 NON-RECURRENT ACUTE SUPPURATIVE OTITIS MEDIA OF RIGHT EAR WITHOUT SPONTANEOUS RUPTURE OF TYMPANIC MEMBRANE: ICD-10-CM

## 2023-12-22 DIAGNOSIS — U07.1 COVID: ICD-10-CM

## 2023-12-22 PROCEDURE — 3074F SYST BP LT 130 MM HG: CPT

## 2023-12-22 PROCEDURE — 99213 OFFICE O/P EST LOW 20 MIN: CPT | Mod: 25

## 2023-12-22 PROCEDURE — 3079F DIAST BP 80-89 MM HG: CPT

## 2023-12-22 RX ORDER — AMOXICILLIN AND CLAVULANATE POTASSIUM 875; 125 MG/1; MG/1
1 TABLET, FILM COATED ORAL 2 TIMES DAILY
Qty: 14 TABLET | Refills: 0 | Status: SHIPPED | OUTPATIENT
Start: 2023-12-22 | End: 2023-12-29

## 2023-12-22 RX ORDER — CIPROFLOXACIN AND DEXAMETHASONE 3; 1 MG/ML; MG/ML
4 SUSPENSION/ DROPS AURICULAR (OTIC) 2 TIMES DAILY
Qty: 7.5 ML | Refills: 0 | Status: SHIPPED | OUTPATIENT
Start: 2023-12-22 | End: 2023-12-29

## 2023-12-22 ASSESSMENT — ENCOUNTER SYMPTOMS
NECK PAIN: 0
SINUS PAIN: 0
SHORTNESS OF BREATH: 0
CHILLS: 1
FEVER: 0
HEADACHES: 0
DIARRHEA: 0
COUGH: 0
VOMITING: 0
STRIDOR: 0
HEMOPTYSIS: 0
ABDOMINAL PAIN: 0
SPUTUM PRODUCTION: 0
SORE THROAT: 1

## 2023-12-22 ASSESSMENT — FIBROSIS 4 INDEX: FIB4 SCORE: 0.48

## 2023-12-22 NOTE — PROGRESS NOTES
Subjective:   Nubia Arce is a very pleasant 43 y.o. female who presents for:    Chief Complaint   Patient presents with    Pharyngitis     Sx Tested positive for COVID but the main issue is sudden onset of horrible ear ache in right ear. Body aches, slight sore throat x        HPI:    The patient presents to the urgent care for evaluation of right-sided otalgia.  Patient endorses that yesterday, she tested positive for COVID-19.  Last night, she began experiencing sudden, severe pain in the right ear that woke her from sleep.  The area is currently mildly sore and has a sensation of fullness, but the patient denies any discharge or significant hearing loss.  Associated symptoms include feeling feverish, chills, mild diarrhea, myalgias, and a sore throat.  She has used Tylenol and ibuprofen, which she has found to be helpful in improving her symptoms of COVID and otalgia.  Her past medical history is significant for recurrent AOM as a child and she has had 2 episodes of AOM as an adult within the last 2 years.  Pertinent negatives include no abdominal pain, cough, headaches, neck pain, rash, vomiting, shortness of breath, or difficulty swallowing.    ROS:    Review of Systems   Constitutional:  Positive for chills and malaise/fatigue. Negative for fever.   HENT:  Positive for congestion, ear pain and sore throat. Negative for ear discharge, hearing loss, nosebleeds and sinus pain.    Respiratory:  Negative for cough, hemoptysis, sputum production, shortness of breath and stridor.    Gastrointestinal:  Negative for abdominal pain, diarrhea and vomiting.   Musculoskeletal:  Negative for neck pain.   Skin:  Negative for itching and rash.   Neurological:  Negative for headaches.   All other systems reviewed and are negative.      Medications:      Current Outpatient Medications   Medication Sig    citalopram (CELEXA) 10 MG tablet Take 1 Tablet by mouth every day.    acyclovir (ZOVIRAX) 400 MG tablet Take 1  Tablet by mouth 3 times a day. Take at first sign of symptoms for 5 days.    clobetasol (OLUX) 0.05 % foam APPLY ONCE DAILY TO AFFECTED AREA OF SCALP AS NEEDED UNTIL SCALP IS SMOOTH    azelastine (ASTELIN) 137 MCG/SPRAY nasal spray USE 1 SPRAY IN EACH NOSTRIL AT BEDTIME    Ibuprofen (ADVIL PO) Advil    Methylcobalamin (B12-ACTIVE PO) B12-Active    Fish Oil-Cholecalciferol (FISH OIL + D3 PO) Fish Oil    cetirizine (ZYRTEC) 10 MG Tab Take 1 Tablet by mouth every day.    Ascorbic Acid (VITAMIN C) 1000 MG Tab Take  by mouth.    Turmeric 450 MG Cap Take  by mouth.    Omega-3 Fatty Acids (FISH OIL) 1000 MG Cap capsule Take 1 Capsule by mouth 3 times a day with meals.    valacyclovir (VALTREX) 1 GM Tab Take 1,000 mg by mouth 3 times a day.    nystatin (MYCOSTATIN) 979918 UNIT/ML Suspension Take 5 mL by mouth 4 times a day. Swish and spit. Use for one week or until symptoms have resolved.    OMEPRAZOLE PO Omeprazole       Allergies:     Allergies   Allergen Reactions    Doxycycline Vomiting     2016        Problem List:     Patient Active Problem List   Diagnosis    Psoriasis    Chronic LUQ pain    Neck pain    Decreased sensation of lower extremity    Gastroesophageal reflux disease    Numbness and tingling of left side of face    Dizziness    Seasonal depression (HCC)    Abnormal brain MRI    Pineal gland cyst    Lymphadenopathy of head and neck    Dysesthesia    Meningioma (HCC)    Iron deficiency anemia due to chronic blood loss    Acquired hypothyroidism    Breast tenderness in female    MTHFR gene mutation    Dense breast tissue    Tension-type headache, not intractable    H. pylori infection    Itching    Urinary symptom or sign       Surgical History:    Past Surgical History:   Procedure Laterality Date    LUMPECTOMY  1996    left breast fibroadenoma removed    PRIMARY C SECTION         Past Social Hx:     Social History     Socioeconomic History    Marital status:    Tobacco Use    Smoking status: Never     Smokeless tobacco: Never   Vaping Use    Vaping Use: Never used   Substance and Sexual Activity    Alcohol use: Yes     Alcohol/week: 1.8 - 3.6 oz     Types: 3 - 6 Standard drinks or equivalent per week    Drug use: No     Comment: cbd oil prn    Sexual activity: Yes     Partners: Male     Birth control/protection: Condom     Social Determinants of Health     Physical Activity: Insufficiently Active (1/14/2021)    Exercise Vital Sign     Days of Exercise per Week: 3 days     Minutes of Exercise per Session: 40 min        Past Family Hx:      Family History   Problem Relation Age of Onset    Psychiatric Illness Mother         depression    Hypertension Mother     Arthritis Father         psoriatic    Hypertension Father     Psychiatric Illness Father         depression    Cancer Maternal Grandmother     Cancer Maternal Grandfather         stomach    Other Son         speech apraxia       Problem list, medications, and allergies reviewed by myself today in Epic.     Objective:     Vitals:    12/22/23 0938   BP: 124/80   Pulse: 86   Resp: 14   Temp: 36.3 °C (97.3 °F)   SpO2: 98%       Physical Exam  Vitals reviewed.   Constitutional:       General: She is not in acute distress.     Appearance: Normal appearance. She is not ill-appearing, toxic-appearing or diaphoretic.   HENT:      Head: Normocephalic and atraumatic.      Right Ear: Ear canal and external ear normal. No drainage. A middle ear effusion is present. No mastoid tenderness. Tympanic membrane is injected and bulging.      Left Ear: Tympanic membrane, ear canal and external ear normal. No mastoid tenderness. Tympanic membrane is not injected or bulging.      Nose: Nose normal.      Mouth/Throat:      Mouth: Mucous membranes are moist.      Pharynx: Oropharynx is clear.   Eyes:      Extraocular Movements: Extraocular movements intact.      Conjunctiva/sclera: Conjunctivae normal.      Pupils: Pupils are equal, round, and reactive to light.   Cardiovascular:       Rate and Rhythm: Normal rate and regular rhythm.      Pulses: Normal pulses.      Heart sounds: Normal heart sounds.   Pulmonary:      Effort: Pulmonary effort is normal.      Breath sounds: Normal breath sounds.   Abdominal:      General: Abdomen is flat.      Palpations: Abdomen is soft.   Musculoskeletal:         General: Normal range of motion.      Cervical back: Normal range of motion.   Skin:     General: Skin is warm and dry.   Neurological:      General: No focal deficit present.      Mental Status: She is alert and oriented to person, place, and time.   Psychiatric:         Mood and Affect: Mood normal.         Behavior: Behavior normal.         Thought Content: Thought content normal.       Assessment/Plan:     Diagnosis and associated orders:     1. COVID    2. Non-recurrent acute suppurative otitis media of right ear without spontaneous rupture of tympanic membrane  - ciprofloxacin/dexamethasone (CIPRODEX) 0.3-0.1 % Suspension; Administer 4 Drops into affected ear(s) 2 times a day for 7 days.  Dispense: 7.5 mL; Refill: 0  - amoxicillin-clavulanate (AUGMENTIN) 875-125 MG Tab; Take 1 Tablet by mouth 2 times a day for 7 days.  Dispense: 14 Tablet; Refill: 0          Comments/MDM:     Patient reports mild tragal tenderness when touching the ear.  Negative for mastoid tenderness.  Right TM is injected, bulging and has a small effusion present.  I had a comprehensive conversation with the patient involving the self-limiting nature of AOM.  She will start with Ciprodex GTT.  If her symptoms fail to improve or worsen over the next 24 to 48 hours, she will start oral Augmentin.   COVID-19:  I discussed self isolation and provided printed instructions. Educated patient to follow all CDC guidelines regarding infection prevention.  She will need to quarantine for 5 days after onset of symptoms and wear tight fitting mask from day 6 through 10.  Antiviral therapy not indicated at this time  No evidence of lower  respiratory involvement on exam today      Recommend symptomatic care:  OTC second generation antihistamine daily (cetirizine, desloratadine, fexofenadine, levocetirizine, and loratadine) daily IN COMBINATION WITH:  OTC decongestant (Sudafed - Pseudoephedrine) unless contraindication in place, such as hypertension, CAD, narrow-angle glaucoma. Use with caution if the patient has a history of cardiac dysrhythmias, hyperthyroidism, DM, prostatic hypertrophy, and glaucoma should use with caution.  Intranasal fluticasone (Flonase) daily  Nasal saline rinses 2-3 times a day   May use short term nasal sprays, such as oxymetazoline (Afrin) to help relieve nasal discomfort, congestion, and/or pressure. Decongestant sprays should not be used longer than three consecutive days.   Nasal rinsing with saline nasal spray or salt water (e.g., neti pot) can help relieve nasal dryness.  Breathe Right nasal strips at night for nasal congestion  Ponaris nasal emmollient for nasal congestion, dryness, and inflammation (do not use with iodine sensitivity)  Cool mist humidification, chest rubs, warm tea with honey, increased fluid intake to thin secretions  Tylenol or ibuprofen as needed for fever control, body aches, and headaches.    If sore throat is present:   Warm salt water gargles, over-the-counter throat sprays, rest, hydration with frozen (eg, ice or popsicles) or warmed liquids, herbal tea containing licorice root, elm inner bark, marshmallow root, and licorice root aqueous dry extract, Cepacol lozenges, soft diet, honey, vitamin C, zinc lozenges, and elderberry supplements.    Return to clinic or go to the ED if symptoms worsen or fail to improve, or if the patient should develop worsening/increasing sinus pain/pressure, congestion, ear pain, sore throat, headache, cough, shortness of breath, wheezing, chest pain, fever/chills, and/or any concerning symptoms. Patient is in agreement with treatment plan.            All questions  answered. Patient verbalized understanding and is in agreement with this plan of care.     If symptoms are worsening or not improving in 3-5 days, follow-up with PCP or return to UC. Differential diagnosis, natural history, and supportive care discussed. AVS handout given and reviewed with patient. Patient educated on red flags and when to seek treatment back in ED or UC.     I personally reviewed prior external notes and test results pertinent to today's visit.  I have independently reviewed and interpreted all diagnostics ordered during this urgent care visit.     This dictation has been created using voice recognition software. The accuracy of the dictation is limited by the abilities of the software. I expect there may be some errors of grammar and possibly content. I made every attempt to manually correct the errors within my dictation. However, errors related to voice recognition software may still exist and should be interpreted within the appropriate context.    This note was electronically signed by ETTA Chowdhury

## 2023-12-29 ENCOUNTER — OFFICE VISIT (OUTPATIENT)
Dept: URGENT CARE | Facility: CLINIC | Age: 43
End: 2023-12-29
Payer: COMMERCIAL

## 2023-12-29 ENCOUNTER — APPOINTMENT (OUTPATIENT)
Dept: URGENT CARE | Facility: CLINIC | Age: 43
End: 2023-12-29
Payer: COMMERCIAL

## 2023-12-29 VITALS
WEIGHT: 167 LBS | HEIGHT: 64 IN | BODY MASS INDEX: 28.51 KG/M2 | DIASTOLIC BLOOD PRESSURE: 80 MMHG | HEART RATE: 84 BPM | SYSTOLIC BLOOD PRESSURE: 136 MMHG | RESPIRATION RATE: 12 BRPM | TEMPERATURE: 97.3 F | OXYGEN SATURATION: 97 %

## 2023-12-29 DIAGNOSIS — H66.001 ACUTE SUPPURATIVE OTITIS MEDIA OF RIGHT EAR WITHOUT SPONTANEOUS RUPTURE OF TYMPANIC MEMBRANE, RECURRENCE NOT SPECIFIED: ICD-10-CM

## 2023-12-29 PROCEDURE — 3075F SYST BP GE 130 - 139MM HG: CPT | Performed by: FAMILY MEDICINE

## 2023-12-29 PROCEDURE — 99213 OFFICE O/P EST LOW 20 MIN: CPT | Performed by: FAMILY MEDICINE

## 2023-12-29 PROCEDURE — 3079F DIAST BP 80-89 MM HG: CPT | Performed by: FAMILY MEDICINE

## 2023-12-29 ASSESSMENT — ENCOUNTER SYMPTOMS
EYE DISCHARGE: 0
MYALGIAS: 0
WEIGHT LOSS: 0
VOMITING: 0
NAUSEA: 0
EYE REDNESS: 0

## 2023-12-29 ASSESSMENT — FIBROSIS 4 INDEX: FIB4 SCORE: 0.48

## 2023-12-29 NOTE — PROGRESS NOTES
"Subjective     Nubia Gary Arce is a 43 y.o. female who presents with Otalgia (Ear pain is still consistent has been on antibiotics for 3 and a half days on oral and 7 days on ear drops and still in pain )            7/8 days right earache.  Pain is mild however there is a persistent fullness despite 7 doses amoxicillin.  She initially tried antibiotic eardrops without improvement.  Hearing is slightly muffled.  No drainage from ear.  No fever.  No other aggravating or alleviating factors.        Review of Systems   Constitutional:  Negative for malaise/fatigue and weight loss.   Eyes:  Negative for discharge and redness.   Gastrointestinal:  Negative for nausea and vomiting.   Musculoskeletal:  Negative for joint pain and myalgias.   Skin:  Negative for itching and rash.              Objective     /80 (BP Location: Left arm, Patient Position: Sitting)   Pulse 84   Temp 36.3 °C (97.3 °F) (Temporal)   Resp 12   Ht 1.626 m (5' 4\")   Wt 75.8 kg (167 lb)   LMP 11/23/2023 (Approximate)   SpO2 97%   BMI 28.67 kg/m²      Physical Exam  Constitutional:       General: She is not in acute distress.     Appearance: She is well-developed.   HENT:      Head: Normocephalic and atraumatic.      Left Ear: Tympanic membrane normal.      Ears:      Comments: Right middle ear effusion.  TM is mildly red suspect this is improving.  EAC is clear     Nose: Congestion present.   Eyes:      Conjunctiva/sclera: Conjunctivae normal.   Cardiovascular:      Rate and Rhythm: Normal rate and regular rhythm.      Heart sounds: No murmur heard.  Pulmonary:      Effort: Pulmonary effort is normal.      Breath sounds: Normal breath sounds. No wheezing.   Skin:     General: Skin is warm and dry.      Findings: No rash.   Neurological:      Mental Status: She is alert.                             Assessment & Plan        1. Acute suppurative otitis media of right ear without spontaneous rupture of tympanic membrane, recurrence not " specified            Differential diagnosis, natural history, supportive care, and indications for immediate follow-up were discussed.     Suspect this is slowly improving.  Discussed some strategies to equalize pressure on road trip she is starting today.    Complete course antibiotics.  Nasal corticosteroid.  Nasal decongestant spray sparingly.

## 2024-01-10 ENCOUNTER — PATIENT MESSAGE (OUTPATIENT)
Dept: MEDICAL GROUP | Facility: IMAGING CENTER | Age: 44
End: 2024-01-10
Payer: COMMERCIAL

## 2024-01-10 ENCOUNTER — HOSPITAL ENCOUNTER (OUTPATIENT)
Dept: RADIOLOGY | Facility: MEDICAL CENTER | Age: 44
End: 2024-01-10
Attending: FAMILY MEDICINE
Payer: COMMERCIAL

## 2024-01-10 DIAGNOSIS — N64.4 BREAST TENDERNESS IN FEMALE: ICD-10-CM

## 2024-01-10 DIAGNOSIS — R92.30 DENSE BREAST TISSUE: ICD-10-CM

## 2024-01-10 DIAGNOSIS — Z12.31 ENCOUNTER FOR SCREENING MAMMOGRAM FOR MALIGNANT NEOPLASM OF BREAST: ICD-10-CM

## 2024-01-11 ENCOUNTER — APPOINTMENT (OUTPATIENT)
Dept: DERMATOLOGY | Facility: IMAGING CENTER | Age: 44
End: 2024-01-11
Payer: COMMERCIAL

## 2024-01-11 ENCOUNTER — HOSPITAL ENCOUNTER (OUTPATIENT)
Dept: RADIOLOGY | Facility: MEDICAL CENTER | Age: 44
End: 2024-01-11
Attending: FAMILY MEDICINE
Payer: COMMERCIAL

## 2024-01-11 DIAGNOSIS — R92.30 DENSE BREAST TISSUE: ICD-10-CM

## 2024-01-11 DIAGNOSIS — N64.4 BREAST TENDERNESS IN FEMALE: ICD-10-CM

## 2024-01-11 PROCEDURE — G0279 TOMOSYNTHESIS, MAMMO: HCPCS

## 2024-01-11 PROCEDURE — 76642 ULTRASOUND BREAST LIMITED: CPT | Mod: RT

## 2024-01-16 ENCOUNTER — OFFICE VISIT (OUTPATIENT)
Dept: DERMATOLOGY | Facility: IMAGING CENTER | Age: 44
End: 2024-01-16
Payer: COMMERCIAL

## 2024-01-16 DIAGNOSIS — Z12.83 SKIN CANCER SCREENING: ICD-10-CM

## 2024-01-16 DIAGNOSIS — L40.9 PSORIASIS: ICD-10-CM

## 2024-01-16 DIAGNOSIS — D22.9 MULTIPLE NEVI: ICD-10-CM

## 2024-01-16 DIAGNOSIS — D18.01 CHERRY ANGIOMA: ICD-10-CM

## 2024-01-16 DIAGNOSIS — L81.4 LENTIGINES: ICD-10-CM

## 2024-01-16 DIAGNOSIS — D23.9 DERMATOFIBROMA: ICD-10-CM

## 2024-01-16 DIAGNOSIS — L82.1 SK (SEBORRHEIC KERATOSIS): ICD-10-CM

## 2024-01-16 PROCEDURE — 99212 OFFICE O/P EST SF 10 MIN: CPT | Performed by: NURSE PRACTITIONER

## 2024-01-16 RX ORDER — NITROFURANTOIN 25; 75 MG/1; MG/1
CAPSULE ORAL
COMMUNITY
Start: 2023-12-31 | End: 2024-01-24

## 2024-01-16 NOTE — PROGRESS NOTES
"DERMATOLOGY NOTE  FOLLOW UP VISIT       Chief complaint: Follow-Up (ANTOLIN)   Pt last seen by Dr. Echeverria on 06/2022    HPI/location: Left abdomen  Time present: \"can't remember\"  Painful lesion: No  Itching lesion: No  Enlarging lesion: No    Hx of Psoriasis  History of skin cancer: No  History of precancers/actinic keratoses: No  History of biopsies:No  History of blistering/severe sunburns:No  Family history of skin cancer:No  Family history of atypical moles:Yes, Details: mom unknown       Allergies   Allergen Reactions    Doxycycline Vomiting     2016         MEDICATIONS:  Medications relevant to specialty reviewed.     REVIEW OF SYSTEMS:   Positive for skin (see HPI)  Negative for fevers and chills       EXAM:  There were no vitals taken for this visit.  Constitutional: Well-developed, well-nourished, and in no distress.     A total body skin exam was performed excluding the genitals per patient preference and including the following areas: head (including face), neck, chest, abdomen, groin/buttocks, back, bilateral upper extremities, and bilateral lower extremities with the following pertinent findings listed below and/or in assessment/plan.     -Sun exposed skin of trunk and b/l upper, lower extremities and face with very few scattered clinically benign light brown reticulated macules all of which were morphologically similar and none of which were suspicious for skin cancer today on exam    -Very few Scattered 1-3mm bright red macules and thin papules on the trunk and extremities    -Multiple tan medium brown skin-colored macules papules scattered over the trunk, face and extremities--All with benign-appearing pigment network patterns on dermoscopy    -DF rt posterior calf    -1 tan SK on Medial aspect rt lower extremity    IMPRESSION / PLAN:    1. Psoriasis, scalp  Stable and well controlled with as needed use of clobetasol    2. Lentigines  - Benign-appearing nature of lesions discussed during exam.   - " Advised to continue to monitor for any return to clinic for new or concerning changes.      3. SK (seborrheic keratosis)  - Benign-appearing nature of lesions discussed during exam.    Courtesy LN2 applied to SK on RLE for cosmesis  - Advised to continue to monitor for any return to clinic for new or concerning changes.      4. Multiple nevi  - Benign-appearing nature of lesions discussed during exam.   - Advised to continue to monitor for any return to clinic for new or concerning changes.  - ABCDE's of melanoma discussed/handout given      5. Cherry angioma  - Benign-appearing nature of lesions discussed during exam.   - Advised to continue to monitor for any return to clinic for new or concerning changes.      6. Dermatofibroma  - Benign-appearing nature of lesions discussed during exam.   - Advised to continue to monitor for any return to clinic for new or concerning changes.      7. Skin cancer screening  Skin cancer education  discussed importance of sun protective clothing, eyewear in addition to the use of broad spectrum sunscreen with SPF 30 or greater, as well as need for reapplication ~every 2 hours when exposed to UVR/handout given  discussed importance following up for any new or changing lesions as noted in handout given, but every 12 months exams in clinic in the setting of dermatologic history  ABCDE's of melanoma discussed/handout given        Discussed risks associated with LN2, Patient verbalized understanding and agrees with plan regarding the above          Please note that this dictation was created using voice recognition software. I have made every reasonable attempt to correct obvious errors, but I expect that there are errors of grammar and possibly content that I did not discover before finalizing the note.      Return to clinic in: Return in about 1 year (around 1/16/2025) for ANTOLIN. and as needed for any new or changing skin lesions.

## 2024-01-17 ENCOUNTER — APPOINTMENT (OUTPATIENT)
Dept: MEDICAL GROUP | Facility: IMAGING CENTER | Age: 44
End: 2024-01-17
Payer: COMMERCIAL

## 2024-01-24 ENCOUNTER — HOSPITAL ENCOUNTER (OUTPATIENT)
Facility: MEDICAL CENTER | Age: 44
End: 2024-01-24
Attending: FAMILY MEDICINE
Payer: COMMERCIAL

## 2024-01-24 ENCOUNTER — OFFICE VISIT (OUTPATIENT)
Dept: MEDICAL GROUP | Facility: IMAGING CENTER | Age: 44
End: 2024-01-24
Payer: COMMERCIAL

## 2024-01-24 VITALS
DIASTOLIC BLOOD PRESSURE: 80 MMHG | HEIGHT: 64 IN | RESPIRATION RATE: 12 BRPM | SYSTOLIC BLOOD PRESSURE: 110 MMHG | BODY MASS INDEX: 28.2 KG/M2 | HEART RATE: 87 BPM | WEIGHT: 165.2 LBS | OXYGEN SATURATION: 95 % | TEMPERATURE: 98.3 F

## 2024-01-24 DIAGNOSIS — R10.31 BILATERAL LOWER ABDOMINAL DISCOMFORT: ICD-10-CM

## 2024-01-24 DIAGNOSIS — A60.00 GENITAL HERPES SIMPLEX, UNSPECIFIED SITE: ICD-10-CM

## 2024-01-24 DIAGNOSIS — F41.9 ANXIETY: ICD-10-CM

## 2024-01-24 DIAGNOSIS — Z87.440 HISTORY OF UTI: ICD-10-CM

## 2024-01-24 DIAGNOSIS — R33.9 INCOMPLETE BLADDER EMPTYING: ICD-10-CM

## 2024-01-24 DIAGNOSIS — T14.8XXA OPEN WOUND OF SKIN: ICD-10-CM

## 2024-01-24 DIAGNOSIS — R39.9 URINARY SYMPTOM OR SIGN: ICD-10-CM

## 2024-01-24 DIAGNOSIS — R10.32 BILATERAL LOWER ABDOMINAL DISCOMFORT: ICD-10-CM

## 2024-01-24 DIAGNOSIS — Z87.42 HISTORY OF OVARIAN CYST: ICD-10-CM

## 2024-01-24 LAB
APPEARANCE UR: NORMAL
BILIRUB UR STRIP-MCNC: NEGATIVE MG/DL
COLOR UR AUTO: YELLOW
GLUCOSE UR STRIP.AUTO-MCNC: NEGATIVE MG/DL
KETONES UR STRIP.AUTO-MCNC: NEGATIVE MG/DL
LEUKOCYTE ESTERASE UR QL STRIP.AUTO: NEGATIVE
NITRITE UR QL STRIP.AUTO: NEGATIVE
PH UR STRIP.AUTO: 7 [PH] (ref 5–8)
PROT UR QL STRIP: NEGATIVE MG/DL
RBC UR QL AUTO: NEGATIVE
SP GR UR STRIP.AUTO: 1.01
UROBILINOGEN UR STRIP-MCNC: 0.2 MG/DL

## 2024-01-24 PROCEDURE — 87491 CHLMYD TRACH DNA AMP PROBE: CPT

## 2024-01-24 PROCEDURE — 99214 OFFICE O/P EST MOD 30 MIN: CPT | Performed by: FAMILY MEDICINE

## 2024-01-24 PROCEDURE — 3074F SYST BP LT 130 MM HG: CPT | Performed by: FAMILY MEDICINE

## 2024-01-24 PROCEDURE — 87591 N.GONORRHOEAE DNA AMP PROB: CPT

## 2024-01-24 PROCEDURE — 3079F DIAST BP 80-89 MM HG: CPT | Performed by: FAMILY MEDICINE

## 2024-01-24 PROCEDURE — 81002 URINALYSIS NONAUTO W/O SCOPE: CPT | Performed by: FAMILY MEDICINE

## 2024-01-24 RX ORDER — CITALOPRAM 20 MG/1
20 TABLET ORAL DAILY
Qty: 30 TABLET | Refills: 3 | Status: SHIPPED | OUTPATIENT
Start: 2024-01-24 | End: 2024-03-28

## 2024-01-24 RX ORDER — ACYCLOVIR 400 MG/1
400 TABLET ORAL 3 TIMES DAILY
Qty: 15 TABLET | Refills: 1 | Status: SHIPPED | OUTPATIENT
Start: 2024-01-24

## 2024-01-24 ASSESSMENT — FIBROSIS 4 INDEX: FIB4 SCORE: 0.48

## 2024-01-24 ASSESSMENT — PAIN SCALES - GENERAL: PAINLEVEL: NO PAIN

## 2024-01-24 ASSESSMENT — PATIENT HEALTH QUESTIONNAIRE - PHQ9: CLINICAL INTERPRETATION OF PHQ2 SCORE: 0

## 2024-01-24 NOTE — PROGRESS NOTES
"  SUBJECTIVE:    Chief Complaint   Patient presents with    UTI     Pt states she thinks there might be a retained tampon, current herpes break out     Medication Management     Anti depressant- \" not feeling quite right\"        HPI:    Nubia Gary Arce is a 43 y.o. female here for some urinary symptoms and review of medication therapy.    Anxiety-has been transitioning from zoloft to celexa- feels off, libido down more. Not sure helping with anxiety more on Celexa at this time.  Has noted some vibrations.  Was on Zoloft 25 mg, 1.5 tabs in the past.    Hsv flare- multiple times this past year.  Feels having a flare in the anterior portion of her genital region/vulva.    Urine sample- not sure if bladder infection.  Sometimes if issues with tampon might have symptoms.  Concerned she may have a retained tampon.    History of chronic intermittent urinary issues.  Has seen urology in past- recommended a urodyanamic study, has not scheduled.  Prior issues with incomplete bladder emptying.   She is interested in seeing urogynecology.  Tends to have some tenderness in her lower back and then finds it difficult to urinate with having a herpes flare or UTI.  No blood in your urine but slight burning.  3 to 4 days of symptoms.  Feels she has a outbreak of herpes at this moment.  Note she took an antibiotic.  Denies any vaginal discharge.  She does have some left-sided lower pelvic discomfort with a history of ovarian cyst on that side.    Right medial ankle- had skin lesion removed. Using neosporin.     ROS:  No recent fevers or chills. No nausea or vomiting. No diarrhea. No chest pains or shortness of breath. No lower extremity edema.    Current Outpatient Medications on File Prior to Visit   Medication Sig Dispense Refill    citalopram (CELEXA) 10 MG tablet Take 1 Tablet by mouth every day. 30 Tablet 3    acyclovir (ZOVIRAX) 400 MG tablet Take 1 Tablet by mouth 3 times a day. Take at first sign of symptoms for 5 days. " 15 Tablet 1    clobetasol (OLUX) 0.05 % foam APPLY ONCE DAILY TO AFFECTED AREA OF SCALP AS NEEDED UNTIL SCALP IS SMOOTH 50 g 0    Ibuprofen (ADVIL PO) Advil      Methylcobalamin (B12-ACTIVE PO) B12-Active      Fish Oil-Cholecalciferol (FISH OIL + D3 PO) Fish Oil      cetirizine (ZYRTEC) 10 MG Tab Take 1 Tablet by mouth every day.      Ascorbic Acid (VITAMIN C) 1000 MG Tab Take  by mouth.      Omega-3 Fatty Acids (FISH OIL) 1000 MG Cap capsule Take 1 Capsule by mouth 3 times a day with meals.       No current facility-administered medications on file prior to visit.       Allergies   Allergen Reactions    Doxycycline Vomiting     2016        Patient Active Problem List    Diagnosis Date Noted    Itching 04/16/2021    Urinary symptom or sign 04/16/2021    Tension-type headache, not intractable 03/26/2020    H. pylori infection 03/26/2020    Dense breast tissue 07/12/2019    MTHFR gene mutation     Breast tenderness in female 01/17/2019    Acquired hypothyroidism 11/07/2018    Iron deficiency anemia due to chronic blood loss 07/27/2018    Dysesthesia 05/25/2018    Meningioma (HCC) 05/25/2018    Lymphadenopathy of head and neck 02/01/2018    Abnormal brain MRI 01/23/2018    Pineal gland cyst 01/23/2018    Dizziness 12/29/2017    Seasonal depression (HCC) 12/29/2017    Numbness and tingling of left side of face 09/05/2017    Decreased sensation of lower extremity 06/30/2017    Gastroesophageal reflux disease 06/30/2017    Neck pain 03/13/2017    Psoriasis 02/09/2017    Chronic LUQ pain 02/09/2017       Past Medical History:   Diagnosis Date    Adenomyosis     aug 2018    Allergy     Anxiety     Depression     Generalized hypermobility of joints     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)     Meningioma (HCC)     Jan 2017    MTHFR gene mutation     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    Psoriatic arthritis (HCC)        OBJECTIVE:   /80 (BP Location: Left arm, Patient Position: Sitting, BP Cuff Size: Adult)   Pulse 87    "Temp 36.8 °C (98.3 °F) (Temporal)   Resp 12   Ht 1.626 m (5' 4\")   Wt 74.9 kg (165 lb 3.2 oz)   LMP 01/17/2024 (Approximate)   SpO2 95%   BMI 28.36 kg/m²   General: Well-developed well-nourished female, no acute distress  Neck: supple, no lymphadenopathy- cervical or supraclavicular, no thyromegaly  Cardiovascular: regular rate and rhythm, no murmurs, gallops, rubs  Lungs: clear to auscultation bilaterally, no wheezes, crackles, or rhonchi  Abdomen: +bowel sounds, soft, slight TTP LLQ, nondistended, no rebound, no guarding, no hepatosplenomegaly, no CVAT.  Extremities: no cyanosis, clubbing, edema  Skin: Warm and dry. Right medial ankle region with about a quarter size superficial wound, no abnormal drainage/discharge, slight pink of area.  Psych: appropriate mood and affect  PELVIC EXAM: External genitalia and vagina normal.  No significant vulvar lesions noted in the area of discomfort.  Normal appearing cervix. No abnormal discharge. No cervical motion tenderness. Bimanual exam without adnexal masses. Slight suprapubic and slightly to left with TTP.      Latest Reference Range & Units 01/24/24 10:54   POC Color Negative  yellow   POC Appearance Negative  slightly cloudy   POC Specific Gravity <1.005 - >1.030  1.010   POC Urine PH 5.0 - 8.0  7.0   POC Glucose Negative mg/dL negative   POC Ketones Negative mg/dL negative   POC Protein Negative mg/dL negative   POC Nitrites Negative  negative   POC Leukocyte Esterase Negative  negative   POC Blood Negative  negative   POC Bilirubin Negative mg/dL negative   POC Urobiligen Negative (0.2) mg/dL 0.2       Narrative & Impression     1/11/2024 9:09 AM     HISTORY/REASON FOR EXAM:  breast tenderness  Right upper-outer and lower-outer breast pain           TECHNIQUE/EXAM DESCRIPTION AND NUMBER OF VIEWS:  Bilateral tomosynthesis diagnostic mammography was performed with standard mammographic images generated from the data set. Images were reviewed and interpreted with " CAD.     COMPARISON:   Mammogram 1/3/2023 and 6/28/2022     FINDINGS:     MAMMOGRAM:  The breast parenchyma is extremely dense which limits the sensitivity of mammography.     There are postoperative changes in the left upper outer quadrant with mild associated architectural distortion.     There is a localization clip within the right upper-outer quadrant.     There are stable bilateral diffuse benign-appearing calcification.        ULTRASOUND:     The right upper-outer quadrant and right lower outer quadrant evaluated at sites of pain.     There is normal-appearing fibroglandular tissue without mass or other abnormality.     Identified in the right upper-outer quadrant is biopsy proven fibroadenoma measuring 2.1 cm in length.     Additionally, in the right upper-outer quadrant there is a 3.7 cm simple cyst.           IMPRESSION:     1.  No abnormality in the right upper outer or lower outer quadrants at sites of pain     2.  Left upper outer postoperative changes     3.  Localization clip within the right upper-outer quadrant     4.  Stable bilateral benign-appearing calcification     5.  Identified in the right upper-outer quadrant is 2.1 cm biopsy-proven fibroadenoma in a 3.7 cm simple cyst     These results were given to the patient at the time of visit.     R2 - CATEGORY 2: BENIGN FINDING(S)     Ten to twenty percent of all cancers can be categorized as hereditary and the clinical and financial value of identifying patients and families at risk is well documented. If you have a personal or family history of breast, ovarian, fallopian tube,   peritoneal or other cancer, please consult your physician regarding genetic counseling and testing.              Exam Ended: 01/11/24  9:28 AM Last Resulted: 01/11/24 10:09 AM             ASSESSMENT/PLAN:    43 y.o.female     1. Anxiety -patient transitioning from Zoloft therapy.    Will increase Celexa to 20 mg daily as tolerated.  Continue monitor at this time.  citalopram (CELEXA) 20 MG Tab      2. Genital herpes simplex, unspecified site -on exam without current active lesions.  May use acyclovir as needed.  Discussed possible daily prophylactic therapy with acyclovir as needed. acyclovir (ZOVIRAX) 400 MG tablet      3. Urinary symptom or sign -POC urine negative.  She has seen urology in the past and was recommended to have urodynamic studies completed.  Will plan to refer to urogynecology at this time. POCT Urinalysis  Referral to urogynecology      4. History of UTI  Referral to urogynecology      5. Incomplete bladder emptying  Referral to urogynecology      6. Bilateral lower abdominal discomfort -appears associated with urinary ideology, vaginal exam otherwise unremarkable.  Will assess swabs.  Continue to monitor. Chlamydia/GC, PCR (Genital/Anal swab)    VAGINAL PATHOGENS DNA PANEL      7. History of ovarian cyst -left.       8. Open wound of skin -right medial ankle from prior procedure.  Appears healing well.  Keep area clean.  May use topical ointment as needed.  Limit use of Neosporin to monitor for possible allergy.  Monitor.         Return in about 3 weeks (around 2/14/2024).    This medical record contains text that has been entered with the assistance of computer voice recognition and dictation software.  Therefore, it may contain unintended errors in text, spelling, punctuation, or grammar.

## 2024-01-25 DIAGNOSIS — R39.9 URINARY SYMPTOM OR SIGN: ICD-10-CM

## 2024-01-25 LAB
C TRACH DNA GENITAL QL NAA+PROBE: NEGATIVE
N GONORRHOEA DNA GENITAL QL NAA+PROBE: NEGATIVE
SPECIMEN SOURCE: NORMAL

## 2024-02-06 ENCOUNTER — HOSPITAL ENCOUNTER (OUTPATIENT)
Dept: RADIOLOGY | Facility: MEDICAL CENTER | Age: 44
End: 2024-02-06
Attending: FAMILY MEDICINE
Payer: COMMERCIAL

## 2024-02-06 DIAGNOSIS — R10.2 PELVIC PAIN: ICD-10-CM

## 2024-02-06 PROCEDURE — 76830 TRANSVAGINAL US NON-OB: CPT

## 2024-02-08 ENCOUNTER — APPOINTMENT (OUTPATIENT)
Dept: MEDICAL GROUP | Facility: IMAGING CENTER | Age: 44
End: 2024-02-08
Payer: COMMERCIAL

## 2024-03-12 ENCOUNTER — OFFICE VISIT (OUTPATIENT)
Dept: MEDICAL GROUP | Facility: IMAGING CENTER | Age: 44
End: 2024-03-12
Payer: COMMERCIAL

## 2024-03-12 VITALS
WEIGHT: 166.8 LBS | TEMPERATURE: 98.2 F | SYSTOLIC BLOOD PRESSURE: 136 MMHG | RESPIRATION RATE: 14 BRPM | BODY MASS INDEX: 28.48 KG/M2 | OXYGEN SATURATION: 98 % | DIASTOLIC BLOOD PRESSURE: 90 MMHG | HEIGHT: 64 IN | HEART RATE: 83 BPM

## 2024-03-12 DIAGNOSIS — R39.89 URINARY PROBLEM: ICD-10-CM

## 2024-03-12 DIAGNOSIS — D32.9 MENINGIOMA (HCC): ICD-10-CM

## 2024-03-12 DIAGNOSIS — F41.9 ANXIETY: ICD-10-CM

## 2024-03-12 DIAGNOSIS — R29.90 NEUROLOGICAL SYMPTOMS: ICD-10-CM

## 2024-03-12 DIAGNOSIS — R00.2 PALPITATIONS: ICD-10-CM

## 2024-03-12 PROCEDURE — 3075F SYST BP GE 130 - 139MM HG: CPT | Performed by: FAMILY MEDICINE

## 2024-03-12 PROCEDURE — 1126F AMNT PAIN NOTED NONE PRSNT: CPT | Performed by: FAMILY MEDICINE

## 2024-03-12 PROCEDURE — 93000 ELECTROCARDIOGRAM COMPLETE: CPT | Performed by: FAMILY MEDICINE

## 2024-03-12 PROCEDURE — 3080F DIAST BP >= 90 MM HG: CPT | Performed by: FAMILY MEDICINE

## 2024-03-12 PROCEDURE — 99214 OFFICE O/P EST MOD 30 MIN: CPT | Performed by: FAMILY MEDICINE

## 2024-03-12 RX ORDER — ALPRAZOLAM 0.25 MG/1
0.25 TABLET ORAL 2 TIMES DAILY PRN
Qty: 20 TABLET | Refills: 0 | Status: SHIPPED | OUTPATIENT
Start: 2024-03-12 | End: 2024-04-11

## 2024-03-12 ASSESSMENT — PAIN SCALES - GENERAL: PAINLEVEL: NO PAIN

## 2024-03-12 ASSESSMENT — FIBROSIS 4 INDEX: FIB4 SCORE: 0.42

## 2024-03-12 NOTE — PROGRESS NOTES
SUBJECTIVE:    Chief Complaint   Patient presents with    Other     Pt states since last visit she has had neurological issues and has seen neurologist     Medication Management     Pt states she is taking zoloft one and half tabs - experiencing a lot of anxiety waiting to see psych       HPI:     Nubia Arce is a 43 y.o. female with history of hypothyroidism, psoriatic arthritis, seasonal depression, anxiety, pineal gland cyst, meningioma, MTHFR gene mutation and reflux here for follow-up of neurological issues and anxiety.    States she has had a lot of neurological issues since last visit.   Was on celexa in December. Jan, had some tremors, thus switched back to zoloft.   Continued tremors and weakness on right side body.   Reached out to neuro- oncology at [facility].  Had head CT- meningioma appeared stable, calcification of basal ganglia noted.   Saw neurosurgery, Dr. Phan- MR with contrast ordered. Neuro referral- scheduled for June.  Trying to also get into [facility] as current referral is scheduled out a bit.    Anxiety has increased with above issues.   Had to leave yoga early due to weakness. Walking dog, setting limits.   Lower half feels weak in general. 2 appointments with psychiatry.   She has utilize Xanax in past.   Seeing therapist.  She did increase her Zoloft to 1.5 tabs daily of 25 mg.  Mostly of right hand 3rd and 4th fingers tremor.  May feel little short of breath when feeling anxious.  She is scheduled with [facility]. Does have social support.    Was on progesterone in past. Cyst for ovary. Women's health. Urogynecology- will see in April for her history of urinary issues.   A few months ago went off progesterone, had helped with mood. Self discontinued. U/s cyst reduced.     Heart palpitations, not anxiety related, skips a beat occasionally.       ROS:  No recent fevers or chills. No nausea or vomiting. No diarrhea. No chest pain. No lower extremity edema.    Current Outpatient  Medications on File Prior to Visit   Medication Sig Dispense Refill    sertraline (ZOLOFT) 25 MG tablet Take 1 Tablet by mouth every day. 90 Tablet 0    acyclovir (ZOVIRAX) 400 MG tablet Take 1 Tablet by mouth 3 times a day. Take at first sign of symptoms for 5 days. 15 Tablet 1    clobetasol (OLUX) 0.05 % foam APPLY ONCE DAILY TO AFFECTED AREA OF SCALP AS NEEDED UNTIL SCALP IS SMOOTH 50 g 0    Ibuprofen (ADVIL PO) Advil      Methylcobalamin (B12-ACTIVE PO) B12-Active      Fish Oil-Cholecalciferol (FISH OIL + D3 PO) Fish Oil      cetirizine (ZYRTEC) 10 MG Tab Take 1 Tablet by mouth every day.      Ascorbic Acid (VITAMIN C) 1000 MG Tab Take  by mouth.      methocarbamol (ROBAXIN-750) 750 MG Tab Take 1 Tablet by mouth 3 times a day. (Patient not taking: Reported on 3/12/2024) 12 Tablet 0    citalopram (CELEXA) 20 MG Tab Take 1 Tablet by mouth every day. (Patient not taking: Reported on 3/12/2024) 30 Tablet 3     No current facility-administered medications on file prior to visit.       Allergies   Allergen Reactions    Doxycycline Vomiting     2016        Patient Active Problem List    Diagnosis Date Noted    Itching 04/16/2021    Urinary symptom or sign 04/16/2021    Tension-type headache, not intractable 03/26/2020    H. pylori infection 03/26/2020    Dense breast tissue 07/12/2019    MTHFR gene mutation     Breast tenderness in female 01/17/2019    Acquired hypothyroidism 11/07/2018    Iron deficiency anemia due to chronic blood loss 07/27/2018    Dysesthesia 05/25/2018    Meningioma (HCC) 05/25/2018    Lymphadenopathy of head and neck 02/01/2018    Abnormal brain MRI 01/23/2018    Pineal gland cyst 01/23/2018    Dizziness 12/29/2017    Seasonal depression (HCC) 12/29/2017    Numbness and tingling of left side of face 09/05/2017    Decreased sensation of lower extremity 06/30/2017    Gastroesophageal reflux disease 06/30/2017    Neck pain 03/13/2017    Psoriasis 02/09/2017    Chronic LUQ pain 02/09/2017       Past  "Medical History:   Diagnosis Date    Adenomyosis     aug 2018    Allergy     Anxiety     Depression     Generalized hypermobility of joints     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)     Meningioma (HCC)     Jan 2017    MTHFR gene mutation     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    Psoriatic arthritis (HCC)        OBJECTIVE:   BP (!) 136/90 (BP Location: Left arm, Patient Position: Sitting, BP Cuff Size: Adult)   Pulse 83   Temp 36.8 °C (98.2 °F) (Temporal)   Resp 14   Ht 1.626 m (5' 4\")   Wt 75.7 kg (166 lb 12.8 oz)   LMP 03/09/2024 (Approximate)   SpO2 98%   BMI 28.63 kg/m²   General: Well-developed well-nourished female, no acute distress  Neck: supple, no lymphadenopathy- cervical or supraclavicular, no thyromegaly  Cardiovascular: regular rate and rhythm, no murmurs, gallops, rubs  Lungs: clear to auscultation bilaterally, no wheezes, crackles, or rhonchi  Abdomen: +bowel sounds, soft, nontender, nondistended, no rebound, no guarding, no hepatosplenomegaly  Extremities: no cyanosis, clubbing, edema  Skin: Warm and dry  Psych: appropriate mood and affect    EKG: sinus, HR 74      ASSESSMENT/PLAN:    43 y.o.female       1. Anxiety -currently with some increase symptoms related to health concerns.  Was previously on Celexa but she had concernsof tremors at the time.  Has tolerated Zoloft previously which she restarted.  She has tolerated Xanax in the past.  Reviewed options of therapy.  Recommend increase Zoloft to 25 mg, 2 a day as tolerated.  Limit Xanax as needed.  Continue precautions of medications.  Reviewed may consider restarting progesterone therapy.  Recommend magnesium glycine at some supplement.  Continue routine stress management.  Continue routine counseling therapy and follow-up with psychiatry.  Continue to monitor. ALPRAZolam (XANAX) 0.25 MG Tab      2. Neurological symptoms -has had some evaluation and follow-up with neurosurgeon.  Patient to follow-up with neurology as scheduled.       3. " Meningioma (HCC) -had prior imaging.  Stable.  Monitor.       4. Palpitations -normal EKG.  Prior labs reviewed, stable.    Will assess TSH and Holter monitor.  Keep adequately hydrated with adequate electrolyte intake.  Avoid caffeine. TSH WITH REFLEX TO FT4    EKG    [facility] - Cardiology Performed (48HR)      5. Urinary problem   Patient is scheduled to follow-up with urogynecology.         Follow-up sooner as needed.  Return in about 2 months (around 5/12/2024).    This medical record contains text that has been entered with the assistance of computer voice recognition and dictation software.  Therefore, it may contain unintended errors in text, spelling, punctuation, or grammar.

## 2024-03-20 ENCOUNTER — APPOINTMENT (OUTPATIENT)
Dept: MEDICAL GROUP | Facility: IMAGING CENTER | Age: 44
End: 2024-03-20
Payer: COMMERCIAL

## 2024-04-08 ENCOUNTER — TELEPHONE (OUTPATIENT)
Dept: HEALTH INFORMATION MANAGEMENT | Facility: OTHER | Age: 44
End: 2024-04-08
Payer: COMMERCIAL

## 2024-04-18 ENCOUNTER — APPOINTMENT (OUTPATIENT)
Dept: CARDIOLOGY | Facility: MEDICAL CENTER | Age: 44
End: 2024-04-18
Attending: FAMILY MEDICINE
Payer: COMMERCIAL

## 2024-04-22 NOTE — PROGRESS NOTES
Urogynecology & Reconstructive Pelvic Surgery - Consultation Visit    Nubia Arce MRN:[id number] :1980    Referred by: Yessica Duke MD    Reason for Visit: No chief complaint on file.        Subjective     History of Presenting Illness:    Nubia Arce is a 43 y.o. P*** who presents for the evaluation and management of incomplete bladder emptying.     She has previoulsy seen urology, has not undergone any urodynamic testing.     She has anxiety, treatevby medically by PCP.     H/o genital HSV, meningioma    Prior Pelvic surgery:   ***CS     Prior treatment:   ***    Fluid intake:   ***    Pelvic floor symptom review:     Bladder:   Voids per day: *** Voids per night: ***      Urinary incontinence episodes per day: ***    Urge leakage:  {none/on movement to bathroom/full bladder:95703}   Stress leakage: {none/with cough/with laugh/with excercise/..:45082}   Continuous / insensible urine loss: {YES/NO:}   Nocturnal enuresis: {YES/NO:}   Leakage volume: {drops/moderate/large...:51817}   Number of pads/day: ***    Bladder emptying: {complete/incomplete:22526}   Voiding symptoms: {none/hesitancy/post-void...:38850}   UTI in last 12 months: ***   Other urologic history: ***      Prolapse:     Prolapse symptoms: {none/bulge/exteriorized:04042}   Degree of prolapse: {to introitus/beyond introitus/..:66665}   Duration of prolapse symptoms: ***      Bowel:    Constipation: {YES/NO:}   Bowel movements per day: ***    Straining to empty bowels: {YES/NO:}   Splinting to evacuate: {YES/NO:}   Painful evacuation: {YES/NO:}   Difficulty emptying rectum: {YES/NO:}   Incontinence to stool: ***   Blood in stool: {YES/NO:}   Hemorrhoids: {YES/NO:}   Bowel conditions: {IBS/crohns/uc/celiac/cancer/..:62654}   Most recent colonoscopy: ***       Sexual function:    Sexually active: {YES/NO:}   Gender of partners: {male/female/trans-male/trans...:42622}   Pain with  intercourse: {Yes/No:52906}       Pelvic Pain: ***      Past medical and surgical history    Past obstetric history   Number of vaginal deliveries: ***   Number of  deliveries: ***   History of vacuum/forceps: {YES/NO:}   History of obstetric anal sphincter injury: {YES/NO:}    Past gynecological history:    Last menstrual period: No LMP recorded.   History of abnormal uterine bleeding: {YES/NO:}   History of fibroids: {YES/NO:}   History of endometrial polyps:  {YES/NO:}   History of endometriosis: {YES/NO:}   History of cervical dysplasia: {YES/NO:}   Last pap: ***   Current contraception: ***      Past medical history:  Past Medical History:   Diagnosis Date    Pineal gland cyst 2017    Adenomyosis     aug 2018    Allergy     Anxiety     Depression     Generalized hypermobility of joints     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)     Meningioma ([facility])     2017    MTHFR gene mutation     Psoriatic arthritis ([facility])      Past surgical history:  Past Surgical History:   Procedure Laterality Date    LUMPECTOMY      left breast fibroadenoma removed    PRIMARY C SECTION       Medications:has a current medication list which includes the following prescription(s): sertraline, methocarbamol, acyclovir, clobetasol, ibuprofen, methylcobalamin, fish oil-cholecalciferol, cetirizine, and vitamin c.  Allergies:Doxycycline  Family history:  Family History   Problem Relation Age of Onset    Psychiatric Illness Mother         depression    Hypertension Mother     Arthritis Father         psoriatic    Hypertension Father     Psychiatric Illness Father         depression    Cancer Maternal Grandmother     Cancer Maternal Grandfather         stomach    Other Son         speech apraxia     Social history: reports that she has never smoked. She has never used smokeless tobacco. She reports current alcohol use of about 1.8 - 3.6 oz of alcohol per week. She reports that she does not  "use drugs.    Review of systems: A full review of systems was performed, and negative with the exception of want is noted above in the HPI.        Objective        There were no vitals taken for this visit.    Physical Exam    Genitourinary:    Vulva: {wnl/atrophic/lichen sclerosis/...:71459}   Bulbocavernosus reflex: {Intact/Absent:86256}   Anal wink reflex: {Intact/Absent:28688}   Perineal sensation: {wnl/decreased/asymmetrical/...:70242}   Urethra: {wnl/atrophic/caruncle/prolapse/...:42667}   Vagina: {wnl/atrophic/friable/stenotic/...:60201}   Atrophy: {Desc; none/mild/moderate/severe:468107::\"None\"}   Cough stress test: {not performed/positive/negative:44372}    Pelvic floor:    POP-Q: Aa *** / Ba *** / TVL *** / C *** / D *** / Ap *** / Bp *** / GH *** / PB ***   Prolapse stage: ***   Paravaginal defect: {left/right/bilateral:92363}   Cervical elongation: {YES/NO:20266}   Urethral tenderness: {YES/NO:20266}   Bladder/ suprapubic tenderness: {YES/NO:20266}   Levator tenderness: {none/left/right/bilateral:71870}   Levator muscle tone: {wnl/hypertonic/hypotonic:39449}   Pelvic floor contraction strength (modified Oxford scale): {0=none/1=flicker/2=weak/...:65946}   Pelvic floor contraction duration: {absent/brief/normal:43136}   Bimanual exam: {small, mobile uterus/ bulky uterus/...:89397}   Anal resting tone: {wnl/absent/decreased/...:64941}   Anal squeeze tone: {wnl/absent/decreased/...:53372}   Palpable anal sphincter defect: {YES/NO:20266}   Granulation tissue: {YES/NO:20266}   Epithelial erosions: {YES/NO:20266}   Epithelial ulcerations: {YES/NO:20266}   Fistula: {none/apical/anterior/...:83805}   Vaginal band/stricture: {YES/NO:14229}    Procedure Performed: {no/bladder instillation/urethral......:00030}    Diagnostic test and records review:    Urine dipstick: ***     Post-void residual: *** mL, performed by {bladder scanner/cath....:18008}    Labs: ***    Radiology: ***    Procedural: ***    Documentation " reviewed: {prior emr records/flowsheet/...:92341}    Outside records reviewed: *** pages           Assessment & Plan     Nubia Gary Arce is a 43 y.o. P*** with ***. We discussed my recommendations for further diagnosis and treatment at length today.     There are no diagnoses linked to this encounter.               Francy Dubois MD, FACOG  Urogynecology and Reconstructive Pelvic Surgery  Department of Obstetrics and Gynecology  [facility]        This medical record contains text that has been entered with the assistance of computer voice recognition and dictation software.  Therefore, it may contain unintended errors in text, spelling, punctuation, or grammar

## 2024-04-23 ENCOUNTER — APPOINTMENT (OUTPATIENT)
Dept: GYNECOLOGY | Facility: CLINIC | Age: 44
End: 2024-04-23
Payer: COMMERCIAL

## 2024-04-26 ENCOUNTER — GYNECOLOGY VISIT (OUTPATIENT)
Dept: GYNECOLOGY | Facility: CLINIC | Age: 44
End: 2024-04-26
Payer: COMMERCIAL

## 2024-04-26 VITALS
WEIGHT: 168 LBS | DIASTOLIC BLOOD PRESSURE: 92 MMHG | HEART RATE: 87 BPM | SYSTOLIC BLOOD PRESSURE: 153 MMHG | BODY MASS INDEX: 28.68 KG/M2 | HEIGHT: 64 IN

## 2024-04-26 DIAGNOSIS — N95.8 GENITOURINARY SYNDROME OF MENOPAUSE: ICD-10-CM

## 2024-04-26 DIAGNOSIS — N94.0 OVULATION PAIN: ICD-10-CM

## 2024-04-26 DIAGNOSIS — N39.8 VOIDING DYSFUNCTION: Primary | ICD-10-CM

## 2024-04-26 DIAGNOSIS — D32.9 MENINGIOMA (HCC): ICD-10-CM

## 2024-04-26 DIAGNOSIS — N95.1 PERIMENOPAUSAL SYMPTOMS: ICD-10-CM

## 2024-04-26 LAB
APPEARANCE UR: CLEAR
BILIRUB UR STRIP-MCNC: NEGATIVE MG/DL
COLOR UR AUTO: YELLOW
GLUCOSE UR STRIP.AUTO-MCNC: NEGATIVE MG/DL
KETONES UR STRIP.AUTO-MCNC: NEGATIVE MG/DL
LEUKOCYTE ESTERASE UR QL STRIP.AUTO: NEGATIVE
NITRITE UR QL STRIP.AUTO: NEGATIVE
PH UR STRIP.AUTO: 7 [PH] (ref 5–8)
PROT UR QL STRIP: NEGATIVE MG/DL
RBC UR QL AUTO: NORMAL
SP GR UR STRIP.AUTO: 1.02
UROBILINOGEN UR STRIP-MCNC: NORMAL MG/DL

## 2024-04-26 PROCEDURE — 3080F DIAST BP >= 90 MM HG: CPT | Performed by: STUDENT IN AN ORGANIZED HEALTH CARE EDUCATION/TRAINING PROGRAM

## 2024-04-26 PROCEDURE — 81002 URINALYSIS NONAUTO W/O SCOPE: CPT | Performed by: STUDENT IN AN ORGANIZED HEALTH CARE EDUCATION/TRAINING PROGRAM

## 2024-04-26 PROCEDURE — 99204 OFFICE O/P NEW MOD 45 MIN: CPT | Performed by: STUDENT IN AN ORGANIZED HEALTH CARE EDUCATION/TRAINING PROGRAM

## 2024-04-26 PROCEDURE — 3077F SYST BP >= 140 MM HG: CPT | Performed by: STUDENT IN AN ORGANIZED HEALTH CARE EDUCATION/TRAINING PROGRAM

## 2024-04-26 RX ORDER — ACETAMINOPHEN AND CODEINE PHOSPHATE 120; 12 MG/5ML; MG/5ML
1 SOLUTION ORAL DAILY
Qty: 28 TABLET | Refills: 0 | Status: SHIPPED | OUTPATIENT
Start: 2024-04-26

## 2024-04-26 RX ORDER — ESTRADIOL 0.1 MG/G
CREAM VAGINAL
Qty: 1 EACH | Refills: 6 | Status: SHIPPED | OUTPATIENT
Start: 2024-04-26

## 2024-04-26 ASSESSMENT — FIBROSIS 4 INDEX: FIB4 SCORE: 0.42

## 2024-04-26 NOTE — PROGRESS NOTES
PT here today for consult  Ref for Hx of recurrent UTI   Hysterectomy? X  Good #: [phone]   PVR : 57 mL  Pharmacy Verified

## 2024-04-26 NOTE — PROGRESS NOTES
Urogynecology & Reconstructive Pelvic Surgery - Consultation Visit    Nubia Arce MRN:[id number] :1980    Referred by: Yessica Duke     Reason for Visit:   Chief Complaint   Patient presents with    New Patient     Consult          Subjective     History of Presenting Illness:    Nubia Arce is a 43 y.o. P1 who presents for the evaluation and management of difficult urinarion, adenomyosis, painful ovulation.     She has difficulty emptying her bladder with weak stream for the past 3 years on and off. Pressure and urgency accompany her difficult emptying, but not significant pain.     UTIs are also an issue. She was prescribed nitrofurantoin (postcoital), which has helped. She has had 3-4 UTIs per year, not always bacteria positive on culture    Her prior GYN is at [facility] but she would like to routine annual GYN care..  Periods are monthly, not to painful. Progesterone (Maryann) previously helped with ovulation pain, but she previously stopped    She has a benign meningioma and sees a neuro-oncologist at [place] and a neurosurgeon here in [place]. They have previously harman cautions about estrogen levels. She has newer right sided weakness and has undergone further workup was found send basal ganglia calcifications    Prior Pelvic surgery:        Prior treatment:   Progesterone - helped ovulation pain  OCP      Pelvic floor symptom review:     Bladder:   Voids per day: 5-10 Voids per night: 0-2      Urinary incontinence episodes per day: none    Bladder emptying: Incomplete   Voiding symptoms: Hesitancy, Weak Stream, and Double or Triple Voiding   UTI in last 12 months: yes   Other urologic history: no      Prolapse:     Prolapse symptoms: None       Bowel:    No major bowel issues, mild constipation      Sexual function:    Sexually active: Yes   Gender of partners: Male   Pain with intercourse: sometimes, deep, at introitus, dryness.        Pelvic Pain: during ovulation,  tubbing      Past medical and surgical history    Past obstetric history   Number of vaginal deliveries: 0   Number of  deliveries: 1       Past gynecological history:    Last menstrual period: No LMP recorded.   History of abnormal uterine bleeding: currently regular, heavier   History of fibroids: No    History of endometrial polyps:  No    History of endometriosis: No    Last pap: 2023      Past medical history:  Past Medical History:   Diagnosis Date    Pineal gland cyst 2017    Adenomyosis     aug 2018    Allergy     Anxiety     Depression     Generalized hypermobility of joints     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)     Meningioma (HCC)     2017    MTHFR gene mutation     Psoriatic arthritis (HCC)     Urinary tract infection      Past surgical history:  Past Surgical History:   Procedure Laterality Date    LUMPECTOMY      left breast fibroadenoma removed    PRIMARY C SECTION       Medications:has a current medication list which includes the following prescription(s): nitrofurantoin, estradiol, norethindrone, sertraline, acyclovir, clobetasol, ibuprofen, fish oil-cholecalciferol, cetirizine, vitamin c, methocarbamol, and methylcobalamin.  Allergies:Doxycycline  Family history:  Family History   Problem Relation Age of Onset    Psychiatric Illness Mother         depression    Hypertension Mother     Arthritis Father         psoriatic    Hypertension Father     Psychiatric Illness Father         depression    Cancer Maternal Grandmother     Cancer Maternal Grandfather         stomach    Other Son         speech apraxia     Social history: reports that she has never smoked. She has never used smokeless tobacco. She reports current alcohol use of about 1.8 - 3.6 oz of alcohol per week. She reports that she does not use drugs.    Review of systems: A full review of systems was performed, and negative with the exception of want is noted above in the HPI.        Objective        BP (!)  "153/92 (BP Location: Right arm, Patient Position: Sitting, BP Cuff Size: Adult)   Pulse 87   Ht 5' 4\"   Wt 168 lb   BMI 28.84 kg/m²     Physical Exam  Vitals reviewed. Exam conducted with a chaperone present.   Constitutional:       Appearance: Normal appearance.   HENT:      Head: Normocephalic.      Mouth/Throat:      Mouth: Mucous membranes are moist.   Cardiovascular:      Rate and Rhythm: Normal rate.   Pulmonary:      Effort: Pulmonary effort is normal.   Skin:     General: Skin is warm and dry.   Neurological:      Mental Status: She is alert.   Psychiatric:         Mood and Affect: Mood normal.         Procedure Performed: No    Diagnostic test and records review:    Urine dipstick: tr blood     Post-void residual: 57 mL, performed by Bladder Scanner    Labs: n/a    Radiology:     Pelvic US 2024 images reviewed  Both transabdominal and transvaginal scanning were performed to optimally visualize the pelvis.  A moderate postvoid residual is seen  The uterus measures 4.76 cm x 10.29 cm x 6.40 cm.  The uterine myometrium is within normal limits.  section scar is seen. Nabothian cysts noted.  The endometrial echo complex is uniform and measures 1.34 cm.  Low resistive waveforms are confirmed within the ovaries. There is no enlarged cyst.  The right ovary measures 3.93 cm x 1.02 cm x 1.64 cm.  The left ovary measures 4.43 cm x 1.99 cm x 3.00 cm.  No free fluid is seen.  IMPRESSION:  Moderate postvoid residual  Otherwise negative transabdominal and transvaginal appearance of the pelvis.      CT Head 2024:   There is bilateral symmetric calcification in the basal ganglia regions in the globus pallidus.  There are no other focal areas of abnormal density within the gray or white matter. No evidence of acute infarct, hemorrhage, or mass lesion is seen. Paranasal sinuses and mastoids are clear.     Procedural: n/a    Documentation reviewed: Prior EMR Records           Assessment & Plan     " Jessica Arce is a 43 y.o. P1 with voiding symptoms, meningioma, perimenopausal symptoms. We discussed my recommendations for further diagnosis and treatment at length today.     1. Voiding dysfunction  2. Meningioma (HCC)  She has bothersome voiding dysfunction and emptying issues, in conjunction with bladder pressure.  Fortunately she is able to empty her bladder all the way with a normal postvoid residual.  Risk factors for developing this include perimenopause, as well as her meningioma which is causing focal neurologic signs including right-sided weakness.  She is counseled on the link between central nervous system pathology and bladder function.  In the short-term, I think she has significant chance of improvement with pelvic floor physical therapy to help neuromuscular functioning as well as vaginal estrogen in order to help with the atrophic components.  However, if she has persistent or worsening voiding dysfunction symptoms in the presence of a neurological diagnosis, I would recommend urodynamics in the future in order to evaluate overall function and to rule out dyssynergic causes or compliance issues.  - Referral to Physical Therapy  - Vaginal estrogen  - UDS next steps if no improvement    3. Perimenopausal symptoms  4. Genitourinary syndrome of menopause  5. Frequent UTI  She has vaginal atrophy / genitorurinary syndrome of menopause. This is very common and due to low estrogen levels, which render the vaginal tissue thin, irritated, and open to colonization with gut kaz. This can lead to irritation, dryness, painful sex, urinary infections and urinary urgency and incontinence. Discussed risks, benefits, and indications for vaginal estrogen therapy.  Vaginal estrogen is considered a topical-only therapy that has negligible absorption into the bloodstream and is not associated with increased risks for uterine or breast cancers, stroke, blood clots.  Her neurology/neurosurgery teams were previously  cautious about using estrogen due to her meningioma's estrogen sensitivity, however I reassured her that I am confident of the lack of systemic absorption of this medication and it has a high chance of causing quality-of-life improvements in regards to her UTIs and emptying symptoms.  The effects of vaginal estrogen can take weeks to months.  Prescription given for:   - estradiol (ESTRACE VAGINAL) 0.1 MG/GM vaginal cream; Apply 1g cream inside vagina twice per week  Dispense: 1 Each; Refill: 6  - Continue postcoital nitrofurantoin for now, however after a few months of estrogen use you can try stopping this and see if she has decrease in UTIs    6.  Ovulation pain  Previously had a response to progesterone therapy.  I am happy to prescribe this again, however recommend that she does run this particular therapy by her neurology team as some meningiomas can also be progesterone sensitive.  - norethindrone (MICRONOR) 0.35 MG tablet; Take 1 Tablet by mouth every day.  Dispense: 28 Tablet; Refill: 0                   Francy Dubois MD, FACOG  Urogynecology and Reconstructive Pelvic Surgery  Department of Obstetrics and Gynecology  [facility]        This medical record contains text that has been entered with the assistance of computer voice recognition and dictation software.  Therefore, it may contain unintended errors in text, spelling, punctuation, or grammar

## 2024-05-14 ENCOUNTER — NON-PROVIDER VISIT (OUTPATIENT)
Dept: CARDIOLOGY | Facility: MEDICAL CENTER | Age: 44
End: 2024-05-14
Attending: FAMILY MEDICINE
Payer: COMMERCIAL

## 2024-05-14 DIAGNOSIS — R00.2 PALPITATIONS: ICD-10-CM

## 2024-05-14 NOTE — PROGRESS NOTES
Patient enrolled in the 48 hour 3 channel ePatch Holter monitor per Yessica Duke MD. In clinic hook up, monitor s/n [id number].  Pending EOS.

## 2024-05-15 ENCOUNTER — APPOINTMENT (OUTPATIENT)
Dept: MEDICAL GROUP | Facility: IMAGING CENTER | Age: 44
End: 2024-05-15
Payer: COMMERCIAL

## 2024-05-22 ENCOUNTER — TELEPHONE (OUTPATIENT)
Dept: CARDIOLOGY | Facility: MEDICAL CENTER | Age: 44
End: 2024-05-22
Payer: COMMERCIAL

## 2024-05-22 NOTE — TELEPHONE ENCOUNTER
[employer] ePatch EOS to  for review on 5/23/24 as ADD  Monitor noted:  Sinus rhythm,  with an avg rate of 87 BPM  16.3% tachycardia burden  12 patient events corresponding to:  SR

## 2024-05-23 PROCEDURE — 93227 XTRNL ECG REC<48 HR R&I: CPT | Performed by: INTERNAL MEDICINE

## 2024-06-03 ENCOUNTER — TELEPHONE (OUTPATIENT)
Dept: OBGYN | Facility: CLINIC | Age: 44
End: 2024-06-03
Payer: COMMERCIAL

## 2024-06-03 NOTE — TELEPHONE ENCOUNTER
"Contacted patient to see if she was able to  her Norethindrone ( Micronor ) .   Per pharmacy   \"Oral Contraceptive Protocols Failed.\"  Patient states she still has original prescription as she has not started it yet, she will start Sunday.  Will fv for refills prn.   "

## 2024-06-05 ENCOUNTER — OFFICE VISIT (OUTPATIENT)
Dept: NEUROLOGY | Facility: MEDICAL CENTER | Age: 44
End: 2024-06-05
Attending: PSYCHIATRY & NEUROLOGY
Payer: COMMERCIAL

## 2024-06-05 VITALS
OXYGEN SATURATION: 99 % | DIASTOLIC BLOOD PRESSURE: 82 MMHG | RESPIRATION RATE: 16 BRPM | BODY MASS INDEX: 28.91 KG/M2 | SYSTOLIC BLOOD PRESSURE: 128 MMHG | WEIGHT: 169.31 LBS | TEMPERATURE: 98.5 F | HEART RATE: 89 BPM | HEIGHT: 64 IN

## 2024-06-05 DIAGNOSIS — R53.1 RIGHT SIDED WEAKNESS: ICD-10-CM

## 2024-06-05 DIAGNOSIS — R20.0 RIGHT SIDED NUMBNESS: Primary | ICD-10-CM

## 2024-06-05 PROCEDURE — 3074F SYST BP LT 130 MM HG: CPT | Performed by: PSYCHIATRY & NEUROLOGY

## 2024-06-05 PROCEDURE — 99205 OFFICE O/P NEW HI 60 MIN: CPT | Performed by: PSYCHIATRY & NEUROLOGY

## 2024-06-05 PROCEDURE — 99417 PROLNG OP E/M EACH 15 MIN: CPT | Performed by: PSYCHIATRY & NEUROLOGY

## 2024-06-05 PROCEDURE — 3079F DIAST BP 80-89 MM HG: CPT | Performed by: PSYCHIATRY & NEUROLOGY

## 2024-06-05 PROCEDURE — 99212 OFFICE O/P EST SF 10 MIN: CPT | Performed by: PSYCHIATRY & NEUROLOGY

## 2024-06-05 ASSESSMENT — ENCOUNTER SYMPTOMS
NECK PAIN: 0
MEMORY LOSS: 0
SENSORY CHANGE: 1
TINGLING: 1
WEAKNESS: 1
SEIZURES: 0
TREMORS: 1
FALLS: 0
FOCAL WEAKNESS: 1

## 2024-06-05 ASSESSMENT — FIBROSIS 4 INDEX: FIB4 SCORE: 0.42
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
## 2024-06-13 ENCOUNTER — APPOINTMENT (OUTPATIENT)
Dept: MEDICAL GROUP | Facility: IMAGING CENTER | Age: 44
End: 2024-06-13
Payer: COMMERCIAL

## 2024-06-17 ENCOUNTER — HOSPITAL ENCOUNTER (OUTPATIENT)
Dept: LAB | Facility: MEDICAL CENTER | Age: 44
End: 2024-06-17
Attending: FAMILY MEDICINE
Payer: COMMERCIAL

## 2024-06-17 DIAGNOSIS — R00.2 PALPITATIONS: ICD-10-CM

## 2024-06-17 LAB — TSH SERPL DL<=0.005 MIU/L-ACNC: 1.97 UIU/ML (ref 0.38–5.33)

## 2024-06-17 PROCEDURE — 84443 ASSAY THYROID STIM HORMONE: CPT

## 2024-06-17 PROCEDURE — 36415 COLL VENOUS BLD VENIPUNCTURE: CPT

## 2024-07-10 ENCOUNTER — OFFICE VISIT (OUTPATIENT)
Dept: URGENT CARE | Facility: CLINIC | Age: 44
End: 2024-07-10
Payer: COMMERCIAL

## 2024-07-10 ENCOUNTER — APPOINTMENT (OUTPATIENT)
Dept: RADIOLOGY | Facility: IMAGING CENTER | Age: 44
End: 2024-07-10
Payer: COMMERCIAL

## 2024-07-10 VITALS
HEIGHT: 64 IN | HEART RATE: 99 BPM | WEIGHT: 171.7 LBS | BODY MASS INDEX: 29.31 KG/M2 | SYSTOLIC BLOOD PRESSURE: 138 MMHG | RESPIRATION RATE: 16 BRPM | OXYGEN SATURATION: 98 % | DIASTOLIC BLOOD PRESSURE: 86 MMHG | TEMPERATURE: 98.5 F

## 2024-07-10 DIAGNOSIS — S13.4XXA WHIPLASH INJURY TO NECK, INITIAL ENCOUNTER: ICD-10-CM

## 2024-07-10 PROCEDURE — 3075F SYST BP GE 130 - 139MM HG: CPT

## 2024-07-10 PROCEDURE — 72040 X-RAY EXAM NECK SPINE 2-3 VW: CPT | Mod: TC

## 2024-07-10 PROCEDURE — 99213 OFFICE O/P EST LOW 20 MIN: CPT

## 2024-07-10 PROCEDURE — 3079F DIAST BP 80-89 MM HG: CPT

## 2024-07-10 RX ORDER — CYCLOBENZAPRINE HCL 5 MG
5-10 TABLET ORAL
Qty: 15 TABLET | Refills: 0 | Status: SHIPPED | OUTPATIENT
Start: 2024-07-10

## 2024-07-10 RX ORDER — IBUPROFEN 800 MG/1
800 TABLET ORAL EVERY 8 HOURS PRN
Qty: 30 TABLET | Refills: 0 | Status: SHIPPED | OUTPATIENT
Start: 2024-07-10

## 2024-07-10 RX ORDER — IBUPROFEN 200 MG
600 TABLET ORAL ONCE
Status: COMPLETED | OUTPATIENT
Start: 2024-07-10 | End: 2024-07-10

## 2024-07-10 RX ORDER — KETOROLAC TROMETHAMINE 30 MG/ML
30 INJECTION, SOLUTION INTRAMUSCULAR; INTRAVENOUS ONCE
Status: DISCONTINUED | OUTPATIENT
Start: 2024-07-10 | End: 2024-07-10

## 2024-07-10 RX ORDER — LIDOCAINE 4 G/G
PATCH TOPICAL
Qty: 15 PATCH | Refills: 0 | Status: SHIPPED | OUTPATIENT
Start: 2024-07-10

## 2024-07-10 RX ADMIN — Medication 600 MG: at 18:04

## 2024-07-10 ASSESSMENT — ENCOUNTER SYMPTOMS
HEADACHES: 0
NAUSEA: 0
ABDOMINAL PAIN: 0
MYALGIAS: 0
CHILLS: 0
SORE THROAT: 0
DIARRHEA: 0
NECK PAIN: 1
FEVER: 0
VOMITING: 0
SHORTNESS OF BREATH: 0
COUGH: 0

## 2024-07-10 ASSESSMENT — FIBROSIS 4 INDEX: FIB4 SCORE: 0.42

## 2024-08-14 ENCOUNTER — HOSPITAL ENCOUNTER (OUTPATIENT)
Dept: RADIOLOGY | Facility: MEDICAL CENTER | Age: 44
End: 2024-08-14
Attending: PSYCHIATRY & NEUROLOGY
Payer: COMMERCIAL

## 2024-08-14 DIAGNOSIS — R20.0 RIGHT SIDED NUMBNESS: ICD-10-CM

## 2024-08-14 DIAGNOSIS — R53.1 RIGHT SIDED WEAKNESS: ICD-10-CM

## 2024-08-14 PROCEDURE — 72141 MRI NECK SPINE W/O DYE: CPT

## 2024-08-27 ENCOUNTER — APPOINTMENT (OUTPATIENT)
Dept: NEUROLOGY | Facility: MEDICAL CENTER | Age: 44
End: 2024-08-27
Attending: SPECIALIST
Payer: COMMERCIAL

## 2024-09-13 ENCOUNTER — APPOINTMENT (OUTPATIENT)
Dept: NEUROLOGY | Facility: MEDICAL CENTER | Age: 44
End: 2024-09-13
Attending: PSYCHIATRY & NEUROLOGY
Payer: COMMERCIAL

## 2024-10-07 ENCOUNTER — HOSPITAL ENCOUNTER (OUTPATIENT)
Dept: LAB | Facility: MEDICAL CENTER | Age: 44
End: 2024-10-07
Attending: PHYSICAL MEDICINE & REHABILITATION
Payer: COMMERCIAL

## 2024-10-07 LAB
25(OH)D3 SERPL-MCNC: 61 NG/ML (ref 30–100)
ALBUMIN SERPL BCP-MCNC: 4.1 G/DL (ref 3.2–4.9)
ALBUMIN/GLOB SERPL: 1.4 G/DL
ALP SERPL-CCNC: 49 U/L (ref 30–99)
ALT SERPL-CCNC: 21 U/L (ref 2–50)
ANION GAP SERPL CALC-SCNC: 10 MMOL/L (ref 7–16)
AST SERPL-CCNC: 30 U/L (ref 12–45)
BASOPHILS # BLD AUTO: 0.9 % (ref 0–1.8)
BASOPHILS # BLD: 0.04 K/UL (ref 0–0.12)
BILIRUB SERPL-MCNC: 0.4 MG/DL (ref 0.1–1.5)
BUN SERPL-MCNC: 13 MG/DL (ref 8–22)
CA-I SERPL-SCNC: 1.2 MMOL/L (ref 1.1–1.3)
CALCIUM ALBUM COR SERPL-MCNC: 8.7 MG/DL (ref 8.5–10.5)
CALCIUM SERPL-MCNC: 8.8 MG/DL (ref 8.5–10.5)
CHLORIDE SERPL-SCNC: 105 MMOL/L (ref 96–112)
CO2 SERPL-SCNC: 22 MMOL/L (ref 20–33)
CREAT SERPL-MCNC: 0.77 MG/DL (ref 0.5–1.4)
DHEA-S SERPL-MCNC: 192 UG/DL (ref 35.4–256)
EOSINOPHIL # BLD AUTO: 0.1 K/UL (ref 0–0.51)
EOSINOPHIL NFR BLD: 2.4 % (ref 0–6.9)
ERYTHROCYTE [DISTWIDTH] IN BLOOD BY AUTOMATED COUNT: 41 FL (ref 35.9–50)
ESTRADIOL SERPL-MCNC: 177 PG/ML
FERRITIN SERPL-MCNC: 17.7 NG/ML (ref 10–291)
GFR SERPLBLD CREATININE-BSD FMLA CKD-EPI: 97 ML/MIN/1.73 M 2
GLOBULIN SER CALC-MCNC: 2.9 G/DL (ref 1.9–3.5)
GLUCOSE SERPL-MCNC: 100 MG/DL (ref 65–99)
HCT VFR BLD AUTO: 40.9 % (ref 37–47)
HCYS SERPL-SCNC: 8.43 UMOL/L
HGB BLD-MCNC: 13.4 G/DL (ref 12–16)
IMM GRANULOCYTES # BLD AUTO: 0.01 K/UL (ref 0–0.11)
IMM GRANULOCYTES NFR BLD AUTO: 0.2 % (ref 0–0.9)
LYMPHOCYTES # BLD AUTO: 1.25 K/UL (ref 1–4.8)
LYMPHOCYTES NFR BLD: 29.6 % (ref 22–41)
MCH RBC QN AUTO: 25.9 PG (ref 27–33)
MCHC RBC AUTO-ENTMCNC: 32.8 G/DL (ref 32.2–35.5)
MCV RBC AUTO: 79.1 FL (ref 81.4–97.8)
MONOCYTES # BLD AUTO: 0.25 K/UL (ref 0–0.85)
MONOCYTES NFR BLD AUTO: 5.9 % (ref 0–13.4)
NEUTROPHILS # BLD AUTO: 2.58 K/UL (ref 1.82–7.42)
NEUTROPHILS NFR BLD: 61 % (ref 44–72)
NRBC # BLD AUTO: 0 K/UL
NRBC BLD-RTO: 0 /100 WBC (ref 0–0.2)
PLATELET # BLD AUTO: 309 K/UL (ref 164–446)
PMV BLD AUTO: 10 FL (ref 9–12.9)
POTASSIUM SERPL-SCNC: 3.9 MMOL/L (ref 3.6–5.5)
PROGEST SERPL-MCNC: 12.8 NG/ML
PROT SERPL-MCNC: 7 G/DL (ref 6–8.2)
RBC # BLD AUTO: 5.17 M/UL (ref 4.2–5.4)
SODIUM SERPL-SCNC: 137 MMOL/L (ref 135–145)
T3FREE SERPL-MCNC: 3.1 PG/ML (ref 2–4.4)
T4 FREE SERPL-MCNC: 1.38 NG/DL (ref 0.93–1.7)
THYROPEROXIDASE AB SERPL-ACNC: <9 IU/ML (ref 0–9)
TSH SERPL-ACNC: 2.34 UIU/ML (ref 0.35–5.5)
VIT B12 SERPL-MCNC: 616 PG/ML (ref 211–911)
WBC # BLD AUTO: 4.2 K/UL (ref 4.8–10.8)

## 2024-10-07 PROCEDURE — 84140 ASSAY OF PREGNENOLONE: CPT

## 2024-10-07 PROCEDURE — 86665 EPSTEIN-BARR CAPSID VCA: CPT

## 2024-10-07 PROCEDURE — 82728 ASSAY OF FERRITIN: CPT

## 2024-10-07 PROCEDURE — 84270 ASSAY OF SEX HORMONE GLOBUL: CPT

## 2024-10-07 PROCEDURE — 86790 VIRUS ANTIBODY NOS: CPT

## 2024-10-07 PROCEDURE — 84305 ASSAY OF SOMATOMEDIN: CPT

## 2024-10-07 PROCEDURE — 86800 THYROGLOBULIN ANTIBODY: CPT

## 2024-10-07 PROCEDURE — 87469 BABESIA MICROTI AMP PRB: CPT

## 2024-10-07 PROCEDURE — 82679 ASSAY OF ESTRONE: CPT

## 2024-10-07 PROCEDURE — 86038 ANTINUCLEAR ANTIBODIES: CPT

## 2024-10-07 PROCEDURE — 86258 DGP ANTIBODY EACH IG CLASS: CPT

## 2024-10-07 PROCEDURE — 84403 ASSAY OF TOTAL TESTOSTERONE: CPT

## 2024-10-07 PROCEDURE — 87468 ANAPLSMA PHGCYTOPHLM AMP PRB: CPT

## 2024-10-07 PROCEDURE — 87798 DETECT AGENT NOS DNA AMP: CPT

## 2024-10-07 PROCEDURE — 87484 EHRLICHA CHAFFEENSIS AMP PRB: CPT

## 2024-10-07 PROCEDURE — 86618 LYME DISEASE ANTIBODY: CPT

## 2024-10-07 PROCEDURE — 83090 ASSAY OF HOMOCYSTEINE: CPT

## 2024-10-07 PROCEDURE — 86364 TISS TRNSGLTMNASE EA IG CLAS: CPT | Mod: 91

## 2024-10-07 PROCEDURE — 86376 MICROSOMAL ANTIBODY EACH: CPT

## 2024-10-07 PROCEDURE — 82330 ASSAY OF CALCIUM: CPT

## 2024-10-07 PROCEDURE — 84144 ASSAY OF PROGESTERONE: CPT

## 2024-10-07 PROCEDURE — 82306 VITAMIN D 25 HYDROXY: CPT

## 2024-10-07 PROCEDURE — 82607 VITAMIN B-12: CPT

## 2024-10-07 PROCEDURE — 84439 ASSAY OF FREE THYROXINE: CPT

## 2024-10-07 PROCEDURE — 84481 FREE ASSAY (FT-3): CPT

## 2024-10-07 PROCEDURE — 82627 DEHYDROEPIANDROSTERONE: CPT

## 2024-10-07 PROCEDURE — 84482 T3 REVERSE: CPT

## 2024-10-07 PROCEDURE — 80053 COMPREHEN METABOLIC PANEL: CPT

## 2024-10-07 PROCEDURE — 83525 ASSAY OF INSULIN: CPT

## 2024-10-07 PROCEDURE — 84630 ASSAY OF ZINC: CPT

## 2024-10-07 PROCEDURE — 86738 MYCOPLASMA ANTIBODY: CPT

## 2024-10-07 PROCEDURE — 82670 ASSAY OF TOTAL ESTRADIOL: CPT

## 2024-10-07 PROCEDURE — 82642 DIHYDROTESTOSTERONE: CPT

## 2024-10-07 PROCEDURE — 84443 ASSAY THYROID STIM HORMONE: CPT

## 2024-10-07 PROCEDURE — 86611 BARTONELLA ANTIBODY: CPT | Mod: 91

## 2024-10-07 PROCEDURE — 83735 ASSAY OF MAGNESIUM: CPT

## 2024-10-07 PROCEDURE — 85025 COMPLETE CBC W/AUTO DIFF WBC: CPT

## 2024-10-07 PROCEDURE — 82525 ASSAY OF COPPER: CPT

## 2024-10-07 PROCEDURE — 36415 COLL VENOUS BLD VENIPUNCTURE: CPT

## 2024-10-08 LAB
EBV VCA IGG SER IA-ACNC: 74.8 U/ML (ref 0–21.9)
GLIADIN IGA SER IA-ACNC: 0.76 FLU (ref 0–4.99)
GLIADIN IGG SER IA-ACNC: 1.14 FLU (ref 0–4.99)
NUCLEAR IGG SER QL IA: NORMAL
TTG IGA SER IA-ACNC: <1.02 FLU (ref 0–4.99)
TTG IGG SER IA-ACNC: <0.82 FLU (ref 0–4.99)
ZINC SERPL-MCNC: 73.6 UG/DL (ref 60–120)

## 2024-10-09 LAB
B BURGDOR.VLSE1+PEPC10 AB SER IA-ACNC: 0.37 IV
COPPER SERPL-MCNC: 147.6 UG/DL (ref 80–155)
M PNEUMO IGG SER IA-ACNC: 0.13 U/L
M PNEUMO IGM SER IA-ACNC: 0.02 U/L

## 2024-10-10 LAB
B HENSELAE IGG TITR SER IF: NORMAL {TITER}
B HENSELAE IGM TITR SER IF: NORMAL {TITER}
B QUINTANA IGG TITR SER: NORMAL {TITER}
B QUINTANA IGM TITR SER: NORMAL {TITER}
ESTRONE SERPL-MCNC: 114.7 PG/ML
IGF-I SERPL-MCNC: 145 NG/ML (ref 69–253)
IGF-I Z-SCORE SERPL: 0.1
INSULIN P FAST SERPL-ACNC: 4 UIU/ML (ref 3–25)
MAGNESIUM RBC-SCNC: 5.6 MG/DL (ref 3.6–7.5)
TEST NAME 95000: NORMAL

## 2024-10-11 LAB — TEST NAME 95000: NORMAL

## 2024-10-13 LAB — PREG SERPL-MCNC: 164 NG/DL (ref 15–132)

## 2024-10-15 LAB
A PHAGOCYTOPH DNA BLD QL NAA+PROBE: ABNORMAL
ANDROSTANOLONE SERPL-MCNC: 200.7 PG/ML (ref 24–208)
B MICROTI DNA BLD QL NAA+PROBE: NOT DETECTED
BABESIA DNA BLD QL NAA+PROBE: NOT DETECTED
E CHAFFEENSIS DNA BLD QL NAA+PROBE: NOT DETECTED
E EWINGII DNA SPEC QL NAA+PROBE: NOT DETECTED
EHRLICHIA DNA SPEC QL NAA+PROBE: NOT DETECTED
SHBG SERPL-SCNC: 200 NMOL/L (ref 25–122)
TESTOST SERPL-MCNC: 50 NG/DL (ref 9–55)
THYROGLOB AB SERPL-ACNC: <0.9 IU/ML (ref 0–4)

## 2024-10-17 LAB — T3REVERSE SERPL-MCNC: 20.2 NG/DL (ref 9–27)

## 2024-11-14 ENCOUNTER — OFFICE VISIT (OUTPATIENT)
Dept: DERMATOLOGY | Facility: IMAGING CENTER | Age: 44
End: 2024-11-14
Payer: COMMERCIAL

## 2024-11-14 DIAGNOSIS — L60.8 LONGITUDINAL MELANONYCHIA: ICD-10-CM

## 2024-11-14 PROCEDURE — 99212 OFFICE O/P EST SF 10 MIN: CPT | Performed by: NURSE PRACTITIONER

## 2024-11-15 DIAGNOSIS — L40.9 PSORIASIS: ICD-10-CM

## 2024-11-15 RX ORDER — CLOBETASOL PROPIONATE 0.5 MG/G
AEROSOL, FOAM TOPICAL
Qty: 50 G | Refills: 0 | Status: SHIPPED | OUTPATIENT
Start: 2024-11-15

## 2024-11-15 NOTE — PROGRESS NOTES
DERMATOLOGY NOTE  FOLLOW UP VISIT       Chief complaint: Other (Nail discoloration)     Patient here for nail discoloration on Rt hand thumb and middle finger.        History of skin cancer: No  History of precancers/actinic keratoses: No  History of biopsies:No  History of blistering/severe sunburns:No  Family history of skin cancer:No  Family history of atypical moles:Yes, Details: mom unknown       Allergies   Allergen Reactions    Doxycycline Vomiting     2016         MEDICATIONS:  Medications relevant to specialty reviewed.     REVIEW OF SYSTEMS:   Positive for skin (see HPI)  Negative for fevers and chills       EXAM:  There were no vitals taken for this visit.  Constitutional: Well-developed, well-nourished, and in no distress.     A focused skin exam was performed including the affected areas of the R hand. Notable findings on exam today listed below and/or in assessment/plan.     Faint longitudinal hyperpigmentation to thumb and 3rd fingernail on R hand    IMPRESSION / PLAN:    1. Longitudinal melanonychia, favored diagnosis  Discussed with pt the course/nature of disease, shared content from [facility] to include cause, DDx, eval and tx  Advised follow up in 3 months, sooner if any changes  Pt concerned and would like to have bx, will refer to outside clinic for nail matrix biopsy    - Referral to Dermatology    Patient verbalized understanding and agrees with plan regarding the above            Please note that this dictation was created using voice recognition software. I have made every reasonable attempt to correct obvious errors, but I expect that there are errors of grammar and possibly content that I did not discover before finalizing the note.      Return to clinic in: Return in about 3 months (around 2/14/2025) for nail discoloration follow up. and as needed for any new or changing skin lesions.

## 2024-11-15 NOTE — TELEPHONE ENCOUNTER
Received request via: Patient    Was the patient seen in the last year in this department? Yes    Does the patient have an active prescription (recently filled or refills available) for medication(s) requested? No    Pharmacy Name: [facility]/pharmacy #[id number] - [place] - [address]      Does the patient have FPC Plus and need 100-day supply? (This applies to ALL medications) Patient does not have SCP

## 2024-12-04 ENCOUNTER — OFFICE VISIT (OUTPATIENT)
Dept: MEDICAL GROUP | Facility: IMAGING CENTER | Age: 44
End: 2024-12-04
Payer: COMMERCIAL

## 2024-12-04 VITALS
TEMPERATURE: 97.1 F | WEIGHT: 171.6 LBS | OXYGEN SATURATION: 100 % | DIASTOLIC BLOOD PRESSURE: 76 MMHG | HEIGHT: 64 IN | HEART RATE: 100 BPM | BODY MASS INDEX: 29.3 KG/M2 | SYSTOLIC BLOOD PRESSURE: 140 MMHG

## 2024-12-04 DIAGNOSIS — F41.9 ANXIETY: ICD-10-CM

## 2024-12-04 DIAGNOSIS — F33.8 SEASONAL DEPRESSION (HCC): ICD-10-CM

## 2024-12-04 DIAGNOSIS — Z23 IMMUNIZATION DUE: ICD-10-CM

## 2024-12-04 DIAGNOSIS — H93.8X1 SENSATION OF FULLNESS IN RIGHT EAR: ICD-10-CM

## 2024-12-04 PROCEDURE — 99213 OFFICE O/P EST LOW 20 MIN: CPT | Mod: 25 | Performed by: FAMILY MEDICINE

## 2024-12-04 PROCEDURE — 90656 IIV3 VACC NO PRSV 0.5 ML IM: CPT | Performed by: FAMILY MEDICINE

## 2024-12-04 PROCEDURE — 90471 IMMUNIZATION ADMIN: CPT | Performed by: FAMILY MEDICINE

## 2024-12-04 RX ORDER — PROGESTERONE 100 MG/1
100 CAPSULE ORAL
COMMUNITY
Start: 2024-11-04

## 2024-12-04 ASSESSMENT — FIBROSIS 4 INDEX: FIB4 SCORE: 0.95

## 2024-12-04 NOTE — LETTER
[facility]  Yessica Duke M.D.  [address]   [place] [address]  Fax: [phone]   Authorization for Release/Disclosure of   Protected Health Information   Name: NUBIA ARCE : 1980 SSN: [id number]   Address: [address] [place] [address]  [place] [address] Phone:    [phone] (home)    I authorize the entity listed below to release/disclose the PHI below to:   [facility]/Yessica Duke M.D. and Yessica Duke M.D.   Provider or Entity Name:     Address   City, State, Zip   Phone:      Fax:     Reason for request: continuity of care   Information to be released:    [  ] LAST COLONOSCOPY,  including any PATH REPORT and follow-up  [  ] LAST FIT/COLOGUARD RESULT [  ] LAST DEXA  [  ] LAST MAMMOGRAM  [  ] LAST PAP  [  ] LAST LABS [  ] RETINA EXAM REPORT  [  ] IMMUNIZATION RECORDS  [  ] Release all info      [  ] Check here and initial the line next to each item to release ALL health information INCLUDING  _____ Care and treatment for drug and / or alcohol abuse  _____ HIV testing, infection status, or AIDS  _____ Genetic Testing    DATES OF SERVICE OR TIME PERIOD TO BE DISCLOSED: _____________  I understand and acknowledge that:  * This Authorization may be revoked at any time by you in writing, except if your health information has already been used or disclosed.  * Your health information that will be used or disclosed as a result of you signing this authorization could be re-disclosed by the recipient. If this occurs, your re-disclosed health information may no longer be protected by State or Federal laws.  * You may refuse to sign this Authorization. Your refusal will not affect your ability to obtain treatment.  * This Authorization becomes effective upon signing and will  on (date) __________.      If no date is indicated, this Authorization will  one (1) year from the signature date.    Name: Nubia Arce  Signature: Date:   2024     PLEASE FAX REQUESTED RECORDS BACK  TO: [phone]

## 2024-12-04 NOTE — PROGRESS NOTES
SUBJECTIVE:    Chief Complaint   Patient presents with    Other     Pt request to increasing Prozac   Pt report saw neurologist - pt will update   Holter -results 5/24    Ear Fullness     Right side fullness x 1 week   Pt report little tender        HPI:     Nubia Arce is a 44 y.o. female here to review medication.   Has new therapist. Psychiatrist- has left practice.   Was on celexa- had tremors. Had tried different medication.   10 days ago started prozac 20 mg.   Stressed and overwhelmed- along with some seasonal depression.   Worrying, health issues. Has to do fingernail biopsy. Son- juvenile arthritis. Concerns for other loved ones and their health. Parents not doing well.   Not spiritual but has interest in Latter day, does yoga often.  Has some Hoahaoism friends- seems able to manage stressors well.   Right ear fullness past week.     ROS:  No recent fevers or chills. No nausea or vomiting. No diarrhea. No chest pains or shortness of breath. No lower extremity edema.    Current Outpatient Medications on File Prior to Visit   Medication Sig Dispense Refill    progesterone (PROMETRIUM) 100 MG Cap 100 mg.      clobetasol (OLUX) 0.05 % foam Apply once daily to affected area of scalp as needed until scalp is smooth 50 g 0    ibuprofen (MOTRIN) 800 MG Tab Take 1 Tablet by mouth every 8 hours as needed for Moderate Pain. 30 Tablet 0    NITROFURANTOIN PO Take 50 mg by mouth every 8 hours as needed (After [place]). After [place]      acyclovir (ZOVIRAX) 400 MG tablet Take 1 Tablet by mouth 3 times a day. Take at first sign of symptoms for 5 days. 15 Tablet 1    Ibuprofen (ADVIL PO) Advil      Methylcobalamin (B12-ACTIVE PO)       cetirizine (ZYRTEC) 10 MG Tab Take 1 Tablet by mouth every day.      Ascorbic Acid (VITAMIN C) 1000 MG Tab Take  by mouth.      cyclobenzaprine (FLEXERIL) 5 mg tablet Take 1-2 Tablets by mouth at bedtime as needed for Muscle Spasms. (Patient not taking: Reported on  12/4/2024) 15 Tablet 0    lidocaine (ASPERFLEX) 4 % Patch Apply patch to painful area. Patch may remain in place for up to 12 hours in any 24-hour period. No more than 1 patch can be used in a 24-hour period. (Patient not taking: Reported on 12/4/2024) 15 Patch 0    estradiol (ESTRACE VAGINAL) 0.1 MG/GM vaginal cream Apply 1g cream inside vagina twice per week (Patient not taking: Reported on 12/4/2024) 1 Each 6    Fish Oil-Cholecalciferol (FISH OIL + D3 PO) Fish Oil (Patient not taking: Reported on 12/4/2024)       No current facility-administered medications on file prior to visit.       Allergies   Allergen Reactions    Doxycycline Vomiting     2016        Patient Active Problem List    Diagnosis Date Noted    Right sided weakness 06/05/2024    Itching 04/16/2021    Urinary symptom or sign 04/16/2021    Tension-type headache, not intractable 03/26/2020    H. pylori infection 03/26/2020    Dense breast tissue 07/12/2019    MTHFR gene mutation     Breast tenderness in female 01/17/2019    Acquired hypothyroidism 11/07/2018    Iron deficiency anemia due to chronic blood loss 07/27/2018    Dysesthesia 05/25/2018    Meningioma (HCC) 05/25/2018    Lymphadenopathy of head and neck 02/01/2018    Abnormal brain MRI 01/23/2018    Pineal gland cyst 01/23/2018    Dizziness 12/29/2017    Seasonal depression (HCC) 12/29/2017    Right sided numbness 09/05/2017    Decreased sensation of lower extremity 06/30/2017    Gastroesophageal reflux disease 06/30/2017    Neck pain 03/13/2017    Psoriasis 02/09/2017    Chronic LUQ pain 02/09/2017       Past Medical History:   Diagnosis Date    Adenomyosis     aug 2018    Allergy     Anxiety     Depression     Generalized hypermobility of joints     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)     Meningioma (HCC)     Jan 2017    MTHFR gene mutation     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    Psoriatic arthritis (HCC)     Urinary tract infection          OBJECTIVE:   BP (!) 140/76 (BP Location: Left  "arm, Patient Position: Sitting, BP Cuff Size: Adult)   Pulse 100   Temp 36.2 °C (97.1 °F) (Temporal)   Ht 1.626 m (5' 4\")   Wt 77.8 kg (171 lb 9.6 oz)   LMP 11/10/2024   SpO2 100%   BMI 29.46 kg/m²   General: Well-developed well-nourished female, no acute distress  HEENT: eyes and ears clear  Neck: supple, no lymphadenopathy- cervical or supraclavicular, no thyromegaly  Cardiovascular: regular rate and rhythm, no murmurs, gallops, rubs  Lungs: clear to auscultation bilaterally, no wheezes, crackles, or rhonchi  Abdomen: +bowel sounds, soft, nontender, nondistended, no rebound, no guarding, no hepatosplenomegaly  Extremities: no cyanosis, clubbing, edema  Skin: Warm and dry  Psych: appropriate mood and affect      ASSESSMENT/PLAN:    44 y.o.female       1. Seasonal depression (HCC)-currently with some stressors.    Reviewed management of symptoms and stressors.  Continue on fluoxetine therapy. FLUoxetine (PROZAC) 20 MG Cap      2. Anxiety -as above. FLUoxetine (PROZAC) 20 MG Cap      3. Sensation of fullness in right ear -suspect likely eustachian tube dysfunction.  May consider trial of Flonase OTC as needed.        4. Immunization due  INFLUENZA VACCINE TRI INJ (PF)        Return in about 1 month (around 1/4/2025).  Follow up sooner as needed.    This medical record contains text that has been entered with the assistance of computer voice recognition and dictation software.  Therefore, it may contain unintended errors in text, spelling, punctuation, or grammar.                            "

## 2025-01-02 ENCOUNTER — HOSPITAL ENCOUNTER (OUTPATIENT)
Facility: MEDICAL CENTER | Age: 45
End: 2025-01-02
Attending: FAMILY MEDICINE
Payer: COMMERCIAL

## 2025-01-02 ENCOUNTER — APPOINTMENT (OUTPATIENT)
Dept: MEDICAL GROUP | Facility: IMAGING CENTER | Age: 45
End: 2025-01-02
Payer: COMMERCIAL

## 2025-01-02 VITALS
WEIGHT: 171.2 LBS | OXYGEN SATURATION: 98 % | HEART RATE: 74 BPM | BODY MASS INDEX: 29.23 KG/M2 | TEMPERATURE: 96.9 F | SYSTOLIC BLOOD PRESSURE: 124 MMHG | RESPIRATION RATE: 16 BRPM | HEIGHT: 64 IN | DIASTOLIC BLOOD PRESSURE: 84 MMHG

## 2025-01-02 DIAGNOSIS — N39.8 VOIDING DYSFUNCTION: ICD-10-CM

## 2025-01-02 DIAGNOSIS — R10.10 UPPER ABDOMINAL PAIN: ICD-10-CM

## 2025-01-02 DIAGNOSIS — Z12.11 SCREEN FOR COLON CANCER: ICD-10-CM

## 2025-01-02 DIAGNOSIS — F41.9 ANXIETY: ICD-10-CM

## 2025-01-02 DIAGNOSIS — F33.8 SEASONAL DEPRESSION (HCC): ICD-10-CM

## 2025-01-02 DIAGNOSIS — R39.9 URINARY SYMPTOM OR SIGN: ICD-10-CM

## 2025-01-02 LAB
APPEARANCE UR: NORMAL
BILIRUB UR STRIP-MCNC: NEGATIVE MG/DL
COLOR UR AUTO: NORMAL
GLUCOSE UR STRIP.AUTO-MCNC: NORMAL MG/DL
H PYLORI AG STL QL IA: NOT DETECTED
KETONES UR STRIP.AUTO-MCNC: NEGATIVE MG/DL
LEUKOCYTE ESTERASE UR QL STRIP.AUTO: NEGATIVE
NITRITE UR QL STRIP.AUTO: NEGATIVE
PH UR STRIP.AUTO: 6.5 [PH] (ref 5–8)
PROT UR QL STRIP: NEGATIVE MG/DL
RBC UR QL AUTO: NEGATIVE
SP GR UR STRIP.AUTO: 1.01
UROBILINOGEN UR STRIP-MCNC: 0.2 MG/DL

## 2025-01-02 PROCEDURE — 87338 HPYLORI STOOL AG IA: CPT

## 2025-01-02 PROCEDURE — 81002 URINALYSIS NONAUTO W/O SCOPE: CPT | Performed by: FAMILY MEDICINE

## 2025-01-02 PROCEDURE — 3079F DIAST BP 80-89 MM HG: CPT | Performed by: FAMILY MEDICINE

## 2025-01-02 PROCEDURE — 3074F SYST BP LT 130 MM HG: CPT | Performed by: FAMILY MEDICINE

## 2025-01-02 PROCEDURE — 99214 OFFICE O/P EST MOD 30 MIN: CPT | Performed by: FAMILY MEDICINE

## 2025-01-02 ASSESSMENT — FIBROSIS 4 INDEX: FIB4 SCORE: 0.95

## 2025-01-02 ASSESSMENT — PATIENT HEALTH QUESTIONNAIRE - PHQ9: CLINICAL INTERPRETATION OF PHQ2 SCORE: 0

## 2025-01-02 NOTE — PROGRESS NOTES
SUBJECTIVE:    Chief Complaint   Patient presents with    Follow-Up     1 mo F/U    Abdominal Pain     Left side to middle x 2 weeks  Tender and burning and radiated pain under the rib     UTI     X2 weeks tender lower abdomen  and hard to urinated        HPI:     Nubia Arce is a 44 y.o. female here for follow up of medical issues.   She has seen urogynecology.     Mood better in general. She is on fluoxetine 20 mg daily.   Yoga, gratitude practice.     2 weeks abodominal pain under rib and slightly to left ,sometimes center.   Hx of left ovarian cyst, pain radiates sometimes, but different symptoms.   Advil, 2 ibuprofen. Takes with medication. Fluoxetine.   One day pepto helped. Almost daily.   Getting up from bed noticed with movement. 2/10 level.   More gassy. Not more than usual on coffee or alcohol.   Working with functional med provider in [place]. On supplements.   Did stool sample in  past.   Treated for h pylori in past.     Urinary issues. [facility], abx prophylaxis for intercourse.   Tenderness of bladder area and difficulty going.   Has seen urogynecology as well, given pelvic floor therapy referral.   Started on vaginal estradiol. Was using, then got off track.   Applicator- wastes medication.   Bloated around bladder area, then more difficult to urinate prior to UTI. No blood in urtine.  No blood in stools/ no dark stools. Regular BM.     Spoke with gynecology- breast MRI- was ordered, did not complete yet, had some concerns.   Has dense breast tissue.   Mother's dad's side had breast cancer.       ROS:  No recent fevers or chills. No nausea or vomiting. No diarrhea. No chest pains or shortness of breath. No lower extremity edema.    Current Outpatient Medications on File Prior to Visit   Medication Sig Dispense Refill    progesterone (PROMETRIUM) 100 MG Cap 100 mg.      FLUoxetine (PROZAC) 20 MG Cap Take 1 Capsule by mouth every day. 90 Capsule 1    clobetasol (OLUX) 0.05 % foam Apply  once daily to affected area of scalp as needed until scalp is smooth 50 g 0    ibuprofen (MOTRIN) 800 MG Tab Take 1 Tablet by mouth every 8 hours as needed for Moderate Pain. 30 Tablet 0    NITROFURANTOIN PO Take 50 mg by mouth every 8 hours as needed (After [place]). After [place]      acyclovir (ZOVIRAX) 400 MG tablet Take 1 Tablet by mouth 3 times a day. Take at first sign of symptoms for 5 days. 15 Tablet 1    Ibuprofen (ADVIL PO) Advil      Methylcobalamin (B12-ACTIVE PO)       cetirizine (ZYRTEC) 10 MG Tab Take 1 Tablet by mouth every day.      Ascorbic Acid (VITAMIN C) 1000 MG Tab Take  by mouth.      cyclobenzaprine (FLEXERIL) 5 mg tablet Take 1-2 Tablets by mouth at bedtime as needed for Muscle Spasms. (Patient not taking: Reported on 12/4/2024) 15 Tablet 0    lidocaine (ASPERFLEX) 4 % Patch Apply patch to painful area. Patch may remain in place for up to 12 hours in any 24-hour period. No more than 1 patch can be used in a 24-hour period. (Patient not taking: Reported on 12/4/2024) 15 Patch 0    estradiol (ESTRACE VAGINAL) 0.1 MG/GM vaginal cream Apply 1g cream inside vagina twice per week (Patient not taking: Reported on 12/4/2024) 1 Each 6    Fish Oil-Cholecalciferol (FISH OIL + D3 PO) Fish Oil (Patient not taking: Reported on 12/4/2024)       No current facility-administered medications on file prior to visit.       Allergies   Allergen Reactions    Doxycycline Vomiting     2016        Patient Active Problem List    Diagnosis Date Noted    Right sided weakness 06/05/2024    Itching 04/16/2021    Urinary symptom or sign 04/16/2021    Tension-type headache, not intractable 03/26/2020    H. pylori infection 03/26/2020    Dense breast tissue 07/12/2019    MTHFR gene mutation     Breast tenderness in female 01/17/2019    Acquired hypothyroidism 11/07/2018    Iron deficiency anemia due to chronic blood loss 07/27/2018    Dysesthesia 05/25/2018    Meningioma (HCC) 05/25/2018    Lymphadenopathy of head  "and neck 02/01/2018    Abnormal brain MRI 01/23/2018    Pineal gland cyst 01/23/2018    Dizziness 12/29/2017    Seasonal depression (HCC) 12/29/2017    Right sided numbness 09/05/2017    Decreased sensation of lower extremity 06/30/2017    Gastroesophageal reflux disease 06/30/2017    Neck pain 03/13/2017    Psoriasis 02/09/2017    Chronic LUQ pain 02/09/2017       Past Medical History:   Diagnosis Date    Adenomyosis     aug 2018    Allergy     Anxiety     Depression     Generalized hypermobility of joints     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)     Meningioma (HCC)     Jan 2017    MTHFR gene mutation     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    Psoriatic arthritis (HCC)     Urinary tract infection          OBJECTIVE:   /84 (BP Location: Left arm, Patient Position: Sitting, BP Cuff Size: Adult)   Pulse 74   Temp 36.1 °C (96.9 °F) (Temporal)   Resp 16   Ht 1.626 m (5' 4\")   Wt 77.7 kg (171 lb 3.2 oz)   LMP 12/09/2024   SpO2 98%   BMI 29.39 kg/m²   General: Well-developed well-nourished female, no acute distress  Neck: supple, no lymphadenopathy- cervical or supraclavicular, no thyromegaly  Cardiovascular: regular rate and rhythm, no murmurs, gallops, rubs  Lungs: clear to auscultation bilaterally, no wheezes, crackles, or rhonchi  Abdomen: +bowel sounds, soft, mild epigastric TTP and slightly to left side, nondistended, no rebound, no guarding, no hepatosplenomegaly, no cvat, minimal TTP of costochondral margin  Extremities: no cyanosis, clubbing, edema  Skin: Warm and dry  Psych: appropriate mood and affect    POC urine: negative.     ASSESSMENT/PLAN:    44 y.o.female       1. Seasonal depression (HCC) - improved, continue fluoxetine 20 mg daily. Monitor.        2. Anxiety -see #1.        3. Upper abdominal pain - mild, assess h pylori.   Labs if no improvements prior to follow up. Consider current medication. Continue fluoxetine, modify ibuprofen as tolerated.   Complete stool test, then start omeprazole " therapy.   Reviewed diet change recommendations.  H.PYLORI STOOL ANTIGEN    Comp Metabolic Panel    LIPASE    CBC WITH DIFFERENTIAL    omeprazole (PRILOSEC) 20 MG delayed-release capsule      4. Urinary symptom or sign - urine poc negative. Stable.   Recommend IC diet, anti-inflammatory diet. Start pelvic floor therapy. Management of stress.  POCT Urinalysis      5. Voiding dysfunction   Plan to see pelvic floor therapy.       6. Screen for colon cancer  Referral to GI for Colonoscopy          Return in about 2 weeks (around 1/16/2025). Follow up sooner as needed.     This medical record contains text that has been entered with the assistance of computer voice recognition and dictation software.  Therefore, it may contain unintended errors in text, spelling, punctuation, or grammar.

## 2025-01-13 ENCOUNTER — HOSPITAL ENCOUNTER (OUTPATIENT)
Dept: LAB | Facility: MEDICAL CENTER | Age: 45
End: 2025-01-13
Attending: FAMILY MEDICINE
Payer: COMMERCIAL

## 2025-01-13 DIAGNOSIS — R10.10 UPPER ABDOMINAL PAIN: ICD-10-CM

## 2025-01-13 LAB
ALBUMIN SERPL BCP-MCNC: 4.1 G/DL (ref 3.2–4.9)
ALBUMIN/GLOB SERPL: 1.6 G/DL
ALP SERPL-CCNC: 49 U/L (ref 30–99)
ALT SERPL-CCNC: 25 U/L (ref 2–50)
ANION GAP SERPL CALC-SCNC: 10 MMOL/L (ref 7–16)
AST SERPL-CCNC: 21 U/L (ref 12–45)
BASOPHILS # BLD AUTO: 0.9 % (ref 0–1.8)
BASOPHILS # BLD: 0.04 K/UL (ref 0–0.12)
BILIRUB SERPL-MCNC: <0.2 MG/DL (ref 0.1–1.5)
BUN SERPL-MCNC: 12 MG/DL (ref 8–22)
CALCIUM ALBUM COR SERPL-MCNC: 9 MG/DL (ref 8.5–10.5)
CALCIUM SERPL-MCNC: 9.1 MG/DL (ref 8.5–10.5)
CHLORIDE SERPL-SCNC: 104 MMOL/L (ref 96–112)
CO2 SERPL-SCNC: 25 MMOL/L (ref 20–33)
CREAT SERPL-MCNC: 0.79 MG/DL (ref 0.5–1.4)
EOSINOPHIL # BLD AUTO: 0.15 K/UL (ref 0–0.51)
EOSINOPHIL NFR BLD: 3.3 % (ref 0–6.9)
ERYTHROCYTE [DISTWIDTH] IN BLOOD BY AUTOMATED COUNT: 42.6 FL (ref 35.9–50)
GFR SERPLBLD CREATININE-BSD FMLA CKD-EPI: 94 ML/MIN/1.73 M 2
GLOBULIN SER CALC-MCNC: 2.6 G/DL (ref 1.9–3.5)
GLUCOSE SERPL-MCNC: 109 MG/DL (ref 65–99)
HCT VFR BLD AUTO: 38.9 % (ref 37–47)
HGB BLD-MCNC: 12.4 G/DL (ref 12–16)
IMM GRANULOCYTES # BLD AUTO: 0 K/UL (ref 0–0.11)
IMM GRANULOCYTES NFR BLD AUTO: 0 % (ref 0–0.9)
LIPASE SERPL-CCNC: 32 U/L (ref 11–82)
LYMPHOCYTES # BLD AUTO: 1.17 K/UL (ref 1–4.8)
LYMPHOCYTES NFR BLD: 26 % (ref 22–41)
MCH RBC QN AUTO: 25.5 PG (ref 27–33)
MCHC RBC AUTO-ENTMCNC: 31.9 G/DL (ref 32.2–35.5)
MCV RBC AUTO: 80 FL (ref 81.4–97.8)
MONOCYTES # BLD AUTO: 0.34 K/UL (ref 0–0.85)
MONOCYTES NFR BLD AUTO: 7.6 % (ref 0–13.4)
NEUTROPHILS # BLD AUTO: 2.8 K/UL (ref 1.82–7.42)
NEUTROPHILS NFR BLD: 62.2 % (ref 44–72)
NRBC # BLD AUTO: 0 K/UL
NRBC BLD-RTO: 0 /100 WBC (ref 0–0.2)
PLATELET # BLD AUTO: 333 K/UL (ref 164–446)
PMV BLD AUTO: 9.8 FL (ref 9–12.9)
POTASSIUM SERPL-SCNC: 3.8 MMOL/L (ref 3.6–5.5)
PROT SERPL-MCNC: 6.7 G/DL (ref 6–8.2)
RBC # BLD AUTO: 4.86 M/UL (ref 4.2–5.4)
SODIUM SERPL-SCNC: 139 MMOL/L (ref 135–145)
WBC # BLD AUTO: 4.5 K/UL (ref 4.8–10.8)

## 2025-01-13 PROCEDURE — 36415 COLL VENOUS BLD VENIPUNCTURE: CPT

## 2025-01-13 PROCEDURE — 83690 ASSAY OF LIPASE: CPT

## 2025-01-13 PROCEDURE — 85025 COMPLETE CBC W/AUTO DIFF WBC: CPT

## 2025-01-13 PROCEDURE — 80053 COMPREHEN METABOLIC PANEL: CPT

## 2025-01-18 ENCOUNTER — HOSPITAL ENCOUNTER (EMERGENCY)
Facility: MEDICAL CENTER | Age: 45
End: 2025-01-18
Attending: EMERGENCY MEDICINE
Payer: COMMERCIAL

## 2025-01-18 VITALS
WEIGHT: 171.52 LBS | RESPIRATION RATE: 16 BRPM | BODY MASS INDEX: 29.28 KG/M2 | HEART RATE: 97 BPM | HEIGHT: 64 IN | SYSTOLIC BLOOD PRESSURE: 159 MMHG | DIASTOLIC BLOOD PRESSURE: 88 MMHG | TEMPERATURE: 98.3 F | OXYGEN SATURATION: 95 %

## 2025-01-18 DIAGNOSIS — K29.70 GASTRITIS WITHOUT BLEEDING, UNSPECIFIED CHRONICITY, UNSPECIFIED GASTRITIS TYPE: ICD-10-CM

## 2025-01-18 LAB
ALBUMIN SERPL BCP-MCNC: 4.3 G/DL (ref 3.2–4.9)
ALBUMIN/GLOB SERPL: 1.7 G/DL
ALP SERPL-CCNC: 50 U/L (ref 30–99)
ALT SERPL-CCNC: 18 U/L (ref 2–50)
ANION GAP SERPL CALC-SCNC: 11 MMOL/L (ref 7–16)
APPEARANCE UR: CLEAR
AST SERPL-CCNC: 19 U/L (ref 12–45)
BASOPHILS # BLD AUTO: 1 % (ref 0–1.8)
BASOPHILS # BLD: 0.05 K/UL (ref 0–0.12)
BILIRUB SERPL-MCNC: 0.3 MG/DL (ref 0.1–1.5)
BILIRUB UR QL STRIP.AUTO: NEGATIVE
BUN SERPL-MCNC: 10 MG/DL (ref 8–22)
CALCIUM ALBUM COR SERPL-MCNC: 8.7 MG/DL (ref 8.5–10.5)
CALCIUM SERPL-MCNC: 8.9 MG/DL (ref 8.5–10.5)
CHLORIDE SERPL-SCNC: 104 MMOL/L (ref 96–112)
CO2 SERPL-SCNC: 23 MMOL/L (ref 20–33)
COLOR UR: YELLOW
CREAT SERPL-MCNC: 0.7 MG/DL (ref 0.5–1.4)
EKG IMPRESSION: NORMAL
EOSINOPHIL # BLD AUTO: 0.12 K/UL (ref 0–0.51)
EOSINOPHIL NFR BLD: 2.5 % (ref 0–6.9)
ERYTHROCYTE [DISTWIDTH] IN BLOOD BY AUTOMATED COUNT: 40.8 FL (ref 35.9–50)
GFR SERPLBLD CREATININE-BSD FMLA CKD-EPI: 108 ML/MIN/1.73 M 2
GLOBULIN SER CALC-MCNC: 2.6 G/DL (ref 1.9–3.5)
GLUCOSE SERPL-MCNC: 121 MG/DL (ref 65–99)
GLUCOSE UR STRIP.AUTO-MCNC: NEGATIVE MG/DL
HCG SERPL QL: NEGATIVE
HCT VFR BLD AUTO: 39.5 % (ref 37–47)
HGB BLD-MCNC: 12.8 G/DL (ref 12–16)
IMM GRANULOCYTES # BLD AUTO: 0.01 K/UL (ref 0–0.11)
IMM GRANULOCYTES NFR BLD AUTO: 0.2 % (ref 0–0.9)
KETONES UR STRIP.AUTO-MCNC: NEGATIVE MG/DL
LEUKOCYTE ESTERASE UR QL STRIP.AUTO: NEGATIVE
LIPASE SERPL-CCNC: 30 U/L (ref 11–82)
LYMPHOCYTES # BLD AUTO: 1.18 K/UL (ref 1–4.8)
LYMPHOCYTES NFR BLD: 24.2 % (ref 22–41)
MCH RBC QN AUTO: 25.4 PG (ref 27–33)
MCHC RBC AUTO-ENTMCNC: 32.4 G/DL (ref 32.2–35.5)
MCV RBC AUTO: 78.5 FL (ref 81.4–97.8)
MICRO URNS: NORMAL
MONOCYTES # BLD AUTO: 0.29 K/UL (ref 0–0.85)
MONOCYTES NFR BLD AUTO: 6 % (ref 0–13.4)
NEUTROPHILS # BLD AUTO: 3.22 K/UL (ref 1.82–7.42)
NEUTROPHILS NFR BLD: 66.1 % (ref 44–72)
NITRITE UR QL STRIP.AUTO: NEGATIVE
NRBC # BLD AUTO: 0 K/UL
NRBC BLD-RTO: 0 /100 WBC (ref 0–0.2)
PH UR STRIP.AUTO: 6.5 [PH] (ref 5–8)
PLATELET # BLD AUTO: 323 K/UL (ref 164–446)
PMV BLD AUTO: 9 FL (ref 9–12.9)
POTASSIUM SERPL-SCNC: 3.9 MMOL/L (ref 3.6–5.5)
PROT SERPL-MCNC: 6.9 G/DL (ref 6–8.2)
PROT UR QL STRIP: NEGATIVE MG/DL
RBC # BLD AUTO: 5.03 M/UL (ref 4.2–5.4)
RBC UR QL AUTO: NEGATIVE
SODIUM SERPL-SCNC: 138 MMOL/L (ref 135–145)
SP GR UR STRIP.AUTO: 1.01
UROBILINOGEN UR STRIP.AUTO-MCNC: 0.2 EU/DL
WBC # BLD AUTO: 4.9 K/UL (ref 4.8–10.8)

## 2025-01-18 PROCEDURE — 99284 EMERGENCY DEPT VISIT MOD MDM: CPT | Mod: EDC

## 2025-01-18 PROCEDURE — 93005 ELECTROCARDIOGRAM TRACING: CPT | Mod: TC | Performed by: EMERGENCY MEDICINE

## 2025-01-18 PROCEDURE — 700102 HCHG RX REV CODE 250 W/ 637 OVERRIDE(OP): Performed by: EMERGENCY MEDICINE

## 2025-01-18 PROCEDURE — 84703 CHORIONIC GONADOTROPIN ASSAY: CPT

## 2025-01-18 PROCEDURE — 80053 COMPREHEN METABOLIC PANEL: CPT

## 2025-01-18 PROCEDURE — 83690 ASSAY OF LIPASE: CPT

## 2025-01-18 PROCEDURE — 85025 COMPLETE CBC W/AUTO DIFF WBC: CPT

## 2025-01-18 PROCEDURE — 36415 COLL VENOUS BLD VENIPUNCTURE: CPT | Mod: EDC

## 2025-01-18 PROCEDURE — 81003 URINALYSIS AUTO W/O SCOPE: CPT

## 2025-01-18 PROCEDURE — A9270 NON-COVERED ITEM OR SERVICE: HCPCS | Performed by: EMERGENCY MEDICINE

## 2025-01-18 RX ORDER — SUCRALFATE 1 G/1
1 TABLET ORAL
Qty: 120 TABLET | Refills: 3 | Status: SHIPPED | OUTPATIENT
Start: 2025-01-18

## 2025-01-18 RX ORDER — PANTOPRAZOLE SODIUM 20 MG/1
20 TABLET, DELAYED RELEASE ORAL DAILY
Qty: 30 TABLET | Refills: 0 | Status: SHIPPED | OUTPATIENT
Start: 2025-01-18 | End: 2025-01-24

## 2025-01-18 RX ADMIN — LIDOCAINE HYDROCHLORIDE 30 ML: 20 SOLUTION ORAL; TOPICAL at 10:47

## 2025-01-18 ASSESSMENT — COGNITIVE AND FUNCTIONAL STATUS - GENERAL
DAILY ACTIVITIY SCORE: 24
MOBILITY SCORE: 24
SUGGESTED CMS G CODE MODIFIER MOBILITY: CH
SUGGESTED CMS G CODE MODIFIER DAILY ACTIVITY: CH

## 2025-01-18 ASSESSMENT — PAIN DESCRIPTION - PAIN TYPE: TYPE: ACUTE PAIN

## 2025-01-18 ASSESSMENT — FIBROSIS 4 INDEX: FIB4 SCORE: .5675675675675675676

## 2025-01-18 NOTE — DISCHARGE INSTRUCTIONS
Your symptoms were all very reassuring today, your exam was reassuring.  Your labs also were very reassuring.  Your EKG was normal as well.  Please follow-up closely with gastroenterology and your primary care provider

## 2025-01-18 NOTE — ED NOTES
"Nubia Arce has been discharged from the Emergency Room.    Discharge instructions, which include signs and symptoms to monitor at home for, as well as detailed information regarding gastritis without bleeding provided.  All questions and concerns addressed at this time.      Prescription for GI Cocktail, Protonix, and Carafate provided to patient.     Patient leaves ER in no apparent distress. This RN provided education regarding returning to the ER for any new concerns or changes in patient's condition.      BP (!) 159/88   Pulse 97   Temp 36.8 °C (98.3 °F) (Temporal)   Resp 16   Ht 1.626 m (5' 4\")   Wt 77.8 kg (171 lb 8.3 oz)   LMP 12/09/2024   SpO2 95%   BMI 29.44 kg/m²   " The following medication was given:     MEDICATION: Depo Medrol 40mg  SITE: Right knee  DOSE: mL  LOT #: [id number]  :  [employer]  EXPIRATION DATE:  10/1/2017  NDC#: 9013-5569-94

## 2025-01-18 NOTE — ED PROVIDER NOTES
ED Provider Note    CHIEF COMPLAINT  Chief Complaint   Patient presents with    Abdominal Pain     Left upper radiates entire left side and to flank       EXTERNAL RECORDS REVIEWED  Outpatient Notes patient followed by primary care doctor for left upper quadrant abdominal pain.  Patient on fluoxetine for this, patient started on omeprazole    HPI/ROS      Nubia Gary Arce is a 44 y.o. female who presents with chief complaint of abdominal pain.  Patient reports abdominal pain for approximately 1 month.  Patient reports symptoms are typically worse after eating.  It seems like it is worse if she takes NSAIDs as well, she has a history of psoriatic arthritis so she does not take these with relative frequency but has cut back significantly which she started becoming concerned that she may be developing an ulcer.  Pain also seems a little bit worse when she drinks alcohol, so she is cut back this as well.  She drinks relatively seldomly, only around once to twice a week.  Patient denies any black or bloody stool.  Pain is in her epigastrium and left upper quadrant.  She denies any associated fevers or chills.  She denies any dysuria.  Patient denies any associated vomiting.  Patient denies any associated chest pain or shortness of breath or exertional symptoms.    PAST MEDICAL HISTORY   has a past medical history of Adenomyosis, Allergy, Anxiety, Depression, Generalized hypermobility of joints, Genital HSV, HLA B27 (HLA B27 positive), Meningioma (HCC), MTHFR gene mutation, Pineal gland cyst (09/2017), Psoriatic arthritis ([facility]), and Urinary tract infection.    SURGICAL HISTORY   has a past surgical history that includes primary c section and lumpectomy (1996).    FAMILY HISTORY  Family History   Problem Relation Age of Onset    Psychiatric Illness Mother         depression    Hypertension Mother     Arthritis Father         psoriatic    Hypertension Father     Psychiatric Illness Father         depression    Cancer  "Maternal Grandmother     Cancer Maternal Grandfather         stomach    Other Son         speech apraxia       SOCIAL HISTORY  Social History     Tobacco Use    Smoking status: Never    Smokeless tobacco: Never   Vaping Use    Vaping status: Never Used   Substance and Sexual Activity    Alcohol use: Yes     Alcohol/week: 1.8 - 3.6 oz     Types: 3 - 6 Standard drinks or equivalent per week    Drug use: No     Comment: cbd oil prn    Sexual activity: Yes     Partners: Male     Birth control/protection: Condom       CURRENT MEDICATIONS  Home Medications       Reviewed by Elle Bosch R.N. (Registered Nurse) on 01/18/25 at 0930  Med List Status: Not Addressed     Medication Last Dose Status   acyclovir (ZOVIRAX) 400 MG tablet  Active   Ascorbic Acid (VITAMIN C) 1000 MG Tab  Active   cetirizine (ZYRTEC) 10 MG Tab  Active   clobetasol (OLUX) 0.05 % foam  Active   cyclobenzaprine (FLEXERIL) 5 mg tablet  Active   estradiol (ESTRACE VAGINAL) 0.1 MG/GM vaginal cream  Active   Fish Oil-Cholecalciferol (FISH OIL + D3 PO)  Active   FLUoxetine (PROZAC) 20 MG Cap  Active   Ibuprofen (ADVIL PO)  Active   ibuprofen (MOTRIN) 800 MG Tab  Active   lidocaine (ASPERFLEX) 4 % Patch  Active   Methylcobalamin (B12-ACTIVE PO)  Active   NITROFURANTOIN PO  Active   omeprazole (PRILOSEC) 20 MG delayed-release capsule  Active   progesterone (PROMETRIUM) 100 MG Cap 1/18/2025 Active                    ALLERGIES  Allergies   Allergen Reactions    Doxycycline Vomiting     2016        PHYSICAL EXAM  VITAL SIGNS: BP (!) 153/98   Pulse 95   Temp 36.4 °C (97.6 °F) (Temporal)   Resp 16   Ht 1.626 m (5' 4\")   Wt 77.8 kg (171 lb 8.3 oz)   LMP 12/09/2024   SpO2 97%   BMI 29.44 kg/m²    Physical Exam  Constitutional:       Appearance: She is well-developed.   HENT:      Head: Normocephalic and atraumatic.   Eyes:      Pupils: Pupils are equal, round, and reactive to light.   Cardiovascular:      Rate and Rhythm: Normal rate and regular rhythm. "   Pulmonary:      Effort: Pulmonary effort is normal. No accessory muscle usage or respiratory distress.      Breath sounds: Normal breath sounds.   Abdominal:      General: Bowel sounds are normal.      Palpations: Abdomen is soft. There is no mass.      Tenderness: There is abdominal tenderness.      Comments: Minimal tenderness in the epigastrium and left upper quadrant.  There is no right upper quadrant tenderness.   Musculoskeletal:         General: Normal range of motion.   Skin:     General: Skin is warm.      Capillary Refill: Capillary refill takes less than 2 seconds.   Neurological:      General: No focal deficit present.      Mental Status: She is alert.   Psychiatric:         Mood and Affect: Mood normal. Mood is not anxious.           EKG/LABS  Results for orders placed or performed during the hospital encounter of 01/18/25   CBC WITH DIFFERENTIAL    Collection Time: 01/18/25  9:38 AM   Result Value Ref Range    WBC 4.9 4.8 - 10.8 K/uL    RBC 5.03 4.20 - 5.40 M/uL    Hemoglobin 12.8 12.0 - 16.0 g/dL    Hematocrit 39.5 37.0 - 47.0 %    MCV 78.5 (L) 81.4 - 97.8 fL    MCH 25.4 (L) 27.0 - 33.0 pg    MCHC 32.4 32.2 - 35.5 g/dL    RDW 40.8 35.9 - 50.0 fL    Platelet Count 323 164 - 446 K/uL    MPV 9.0 9.0 - 12.9 fL    Neutrophils-Polys 66.10 44.00 - 72.00 %    Lymphocytes 24.20 22.00 - 41.00 %    Monocytes 6.00 0.00 - 13.40 %    Eosinophils 2.50 0.00 - 6.90 %    Basophils 1.00 0.00 - 1.80 %    Immature Granulocytes 0.20 0.00 - 0.90 %    Nucleated RBC 0.00 0.00 - 0.20 /100 WBC    Neutrophils (Absolute) 3.22 1.82 - 7.42 K/uL    Lymphs (Absolute) 1.18 1.00 - 4.80 K/uL    Monos (Absolute) 0.29 0.00 - 0.85 K/uL    Eos (Absolute) 0.12 0.00 - 0.51 K/uL    Baso (Absolute) 0.05 0.00 - 0.12 K/uL    Immature Granulocytes (abs) 0.01 0.00 - 0.11 K/uL    NRBC (Absolute) 0.00 K/uL   COMP METABOLIC PANEL    Collection Time: 01/18/25  9:38 AM   Result Value Ref Range    Sodium 138 135 - 145 mmol/L    Potassium 3.9 3.6 - 5.5  mmol/L    Chloride 104 96 - 112 mmol/L    Co2 23 20 - 33 mmol/L    Anion Gap 11.0 7.0 - 16.0    Glucose 121 (H) 65 - 99 mg/dL    Bun 10 8 - 22 mg/dL    Creatinine 0.70 0.50 - 1.40 mg/dL    Calcium 8.9 8.5 - 10.5 mg/dL    Correct Calcium 8.7 8.5 - 10.5 mg/dL    AST(SGOT) 19 12 - 45 U/L    ALT(SGPT) 18 2 - 50 U/L    Alkaline Phosphatase 50 30 - 99 U/L    Total Bilirubin 0.3 0.1 - 1.5 mg/dL    Albumin 4.3 3.2 - 4.9 g/dL    Total Protein 6.9 6.0 - 8.2 g/dL    Globulin 2.6 1.9 - 3.5 g/dL    A-G Ratio 1.7 g/dL   LIPASE    Collection Time: 25  9:38 AM   Result Value Ref Range    Lipase 30 11 - 82 U/L   HCG QUAL SERUM    Collection Time: 25  9:38 AM   Result Value Ref Range    Beta-Hcg Qualitative Serum Negative Negative   ESTIMATED GFR    Collection Time: 25  9:38 AM   Result Value Ref Range    GFR (CKD-EPI) 108 >60 mL/min/1.73 m 2   URINALYSIS,CULTURE IF INDICATED    Collection Time: 25 10:00 AM    Specimen: Urine   Result Value Ref Range    Color Yellow     Character Clear     Specific Gravity 1.011 <1.035    Ph 6.5 5.0 - 8.0    Glucose Negative Negative mg/dL    Ketones Negative Negative mg/dL    Protein Negative Negative mg/dL    Bilirubin Negative Negative    Urobilinogen, Urine 0.2 <=1.0 EU/dL    Nitrite Negative Negative    Leukocyte Esterase Negative Negative    Occult Blood Negative Negative    Micro Urine Req see below    EKG    Collection Time: 25 11:18 AM   Result Value Ref Range    Report       [facility] Emergency Dept.    Test Date:  2025  Pt Name:    CORKY PETER                Department: ER  MRN:        [id number]                      Room:       [facility]  Gender:     Female                       Technician: [id number]  :        [id number]                   Requested By:NATALIE MURRIETA  Order #:    [id number]                    Reading MD: Bernice Live MD    Measurements  Intervals                                Axis  Rate:       87                            P:          67  OR:         165                          QRS:        68  QRSD:       102                          T:          24  QT:         361  QTc:        435    Interpretive Statements  EKG is normal sinus rhythm, normal axis normal intervals no ST changes  consistent with acute regional ischemia  Electronically Signed On 01- 11:18:23 PST by Bernice Live MD       *Note: Due to a large number of results and/or encounters for the requested time period, some results have not been displayed. A complete set of results can be found in Results Review.             COURSE & MEDICAL DECISION MAKING    ASSESSMENT, COURSE AND PLAN  Care Narrative: Very well-appearing patient here with symptoms most consistent with gastritis versus developing peptic ulcer disease.  She was started on omeprazole but symptoms were not improving, I believe potentially adding Carafate to her regimen would be helpful here.  Checking screening labs.  Will also check screening EKG though my suspicion of ACS is low here.  Patient EKG is normal.  Patient basic labs all very reassuring.  CBC, CMP reassuring.  I did offer CT or ultrasound but patient is comfortable deferring imaging given my low clinical suspicion of emergent surgical process.  I sent a referral to GI.  Home with Carafate and pantoprazole.          DISPOSITION AND DISCUSSIONS    Escalation of care considered, and ultimately not performed:diagnostic imaging I did offer CT or ultrasound but patient is comfortable deferring imaging given my low clinical suspicion of emergent surgical process.        FINAL DIAGNOSIS  1. Gastritis without bleeding, unspecified chronicity, unspecified gastritis type  pantoprazole (PROTONIX) 20 MG tablet    sucralfate (CARAFATE) 1 GM Tab    Hyoscyamine Sulfate (GI COCKTAIL, HYOSCYAMINE-LIDOCAINE-MAALOX,)    Referral to Gastroenterology

## 2025-01-18 NOTE — Clinical Note
Nubia Arce was seen and treated in our emergency department on 1/18/2025.  She may return to work on 01/20/2025.  Please limit any significant deep palpation of chest abdomen or pelvis while at work as this may exacerbate patient's symptoms     If you have any questions or concerns, please don't hesitate to call.      Calos Queen M.D.

## 2025-01-18 NOTE — ED NOTES
Chief Complaint   Patient presents with    Abdominal Pain     Left upper radiates entire left side and to flank     Pt ambulated to triage with above complaints [id number]. Pain left upper abdomen with nausea . She also reports urinary urgency once in a while.

## 2025-01-23 ENCOUNTER — APPOINTMENT (OUTPATIENT)
Dept: LAB | Facility: MEDICAL CENTER | Age: 45
End: 2025-01-23
Payer: COMMERCIAL

## 2025-01-24 ENCOUNTER — OFFICE VISIT (OUTPATIENT)
Dept: MEDICAL GROUP | Facility: IMAGING CENTER | Age: 45
End: 2025-01-24
Payer: COMMERCIAL

## 2025-01-24 VITALS
HEIGHT: 64 IN | RESPIRATION RATE: 17 BRPM | DIASTOLIC BLOOD PRESSURE: 78 MMHG | WEIGHT: 171 LBS | HEART RATE: 89 BPM | TEMPERATURE: 97 F | BODY MASS INDEX: 29.19 KG/M2 | SYSTOLIC BLOOD PRESSURE: 122 MMHG | OXYGEN SATURATION: 99 %

## 2025-01-24 DIAGNOSIS — F33.8 SEASONAL DEPRESSION (HCC): ICD-10-CM

## 2025-01-24 DIAGNOSIS — N94.0 OVULATION PAIN: ICD-10-CM

## 2025-01-24 DIAGNOSIS — R10.10 UPPER ABDOMINAL PAIN: ICD-10-CM

## 2025-01-24 DIAGNOSIS — R45.89 ANXIETY ABOUT HEALTH: ICD-10-CM

## 2025-01-24 PROCEDURE — 3078F DIAST BP <80 MM HG: CPT | Performed by: FAMILY MEDICINE

## 2025-01-24 PROCEDURE — 99214 OFFICE O/P EST MOD 30 MIN: CPT | Performed by: FAMILY MEDICINE

## 2025-01-24 PROCEDURE — 3074F SYST BP LT 130 MM HG: CPT | Performed by: FAMILY MEDICINE

## 2025-01-24 RX ORDER — PANTOPRAZOLE SODIUM 20 MG/1
20 TABLET, DELAYED RELEASE ORAL 2 TIMES DAILY
Qty: 60 TABLET | Refills: 1 | Status: SHIPPED | OUTPATIENT
Start: 2025-01-24

## 2025-01-24 ASSESSMENT — FIBROSIS 4 INDEX: FIB4 SCORE: 0.62

## 2025-01-24 NOTE — PROGRESS NOTES
SUBJECTIVE:    Chief Complaint   Patient presents with    Follow-Up     2 weeks f/u lab results 1/18       HPI:     Nubia Arce is a 44 y.o. female with anxiety/seasonal depression here for follow-up of abdominal pain symptoms.  States she was having ovulation pain on the left lower side and also her upper abdominal pain seemed to increase and this was seen in the ER on 1/18/2025.  Suspected possible gastritis versus ulcer.  Was started on pantoprazole 20 mg daily and sucralfate at the time.  Prior to that she was taking omeprazole 20 mg daily which did not seem to be adequate.  She had stopped NSAIDs due to her symptoms.  Avoiding NSAIDs and limiting coffee and alcohol at this time.  She has an appointment with GI in April but is trying to get sooner appointment with a different GI office.  She may also consider going to [facility] for care.  She will let us know if she needs referral.  States she also usually takes a probiotic and recently tried again but seem to aggravate her symptoms.  Tends to worry about her health.  She is working with a psychologist individually and in couples therapy.  She is being screened for OCD, associate and with medical issues.  She has found that [facility] has been helpful.  Used to have psychiatrist who retired.  States her son's pediatric rheumatologist is [place] and will be retiring.  Has been listening to her friends concerns as well.  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  Could also be contributing to her mental health.  Thinking of taking a break.  Wondering about increasing the fluoxetine dose.  Prior H. pylori study was negative.      ROS:  No fevers or chills.  No lower extremity edema.  No nausea or vomiting. No diarrhea.  No black or bloody stools.  No dysuria.    Current Outpatient Medications on File Prior to Visit   Medication Sig Dispense Refill    pantoprazole (PROTONIX) 20 MG tablet Take 1 Tablet  by mouth every day. 30 Tablet 0    sucralfate (CARAFATE) 1 GM Tab Take 1 Tablet by mouth 4 Times a Day,Before Meals and at Bedtime. 120 Tablet 3    progesterone (PROMETRIUM) 100 MG Cap 100 mg.      FLUoxetine (PROZAC) 20 MG Cap Take 1 Capsule by mouth every day. 90 Capsule 1    clobetasol (OLUX) 0.05 % foam Apply once daily to affected area of scalp as needed until scalp is smooth 50 g 0    acyclovir (ZOVIRAX) 400 MG tablet Take 1 Tablet by mouth 3 times a day. Take at first sign of symptoms for 5 days. 15 Tablet 1    Methylcobalamin (B12-ACTIVE PO)       cetirizine (ZYRTEC) 10 MG Tab Take 1 Tablet by mouth every day.      Hyoscyamine Sulfate (GI COCKTAIL, HYOSCYAMINE-LIDOCAINE-MAALOX,) Take 15 mL by mouth every 6 hours as needed (abdominal pain or cramping). (Patient not taking: Reported on 1/24/2025) 200 mL 0    ibuprofen (MOTRIN) 800 MG Tab Take 1 Tablet by mouth every 8 hours as needed for Moderate Pain. (Patient not taking: Reported on 1/24/2025) 30 Tablet 0    cyclobenzaprine (FLEXERIL) 5 mg tablet Take 1-2 Tablets by mouth at bedtime as needed for Muscle Spasms. (Patient not taking: Reported on 12/4/2024) 15 Tablet 0    lidocaine (ASPERFLEX) 4 % Patch Apply patch to painful area. Patch may remain in place for up to 12 hours in any 24-hour period. No more than 1 patch can be used in a 24-hour period. (Patient not taking: Reported on 12/4/2024) 15 Patch 0    NITROFURANTOIN PO Take 50 mg by mouth every 8 hours as needed (After [place]). After [place] (Patient not taking: Reported on 1/24/2025)      estradiol (ESTRACE VAGINAL) 0.1 MG/GM vaginal cream Apply 1g cream inside vagina twice per week (Patient not taking: Reported on 12/4/2024) 1 Each 6    Ibuprofen (ADVIL PO) Advil (Patient not taking: Reported on 1/24/2025)      Fish Oil-Cholecalciferol (FISH OIL + D3 PO) Fish Oil (Patient not taking: Reported on 12/4/2024)      Ascorbic Acid (VITAMIN C) 1000 MG Tab Take  by mouth. (Patient not taking: Reported  "on 1/24/2025)       No current facility-administered medications on file prior to visit.       Allergies   Allergen Reactions    Doxycycline Vomiting     2016        Patient Active Problem List    Diagnosis Date Noted    Right sided weakness 06/05/2024    Itching 04/16/2021    Urinary symptom or sign 04/16/2021    Tension-type headache, not intractable 03/26/2020    H. pylori infection 03/26/2020    Dense breast tissue 07/12/2019    MTHFR gene mutation     Breast tenderness in female 01/17/2019    Acquired hypothyroidism 11/07/2018    Iron deficiency anemia due to chronic blood loss 07/27/2018    Dysesthesia 05/25/2018    Meningioma (HCC) 05/25/2018    Lymphadenopathy of head and neck 02/01/2018    Abnormal brain MRI 01/23/2018    Pineal gland cyst 01/23/2018    Dizziness 12/29/2017    Seasonal depression ([facility]) 12/29/2017    Right sided numbness 09/05/2017    Decreased sensation of lower extremity 06/30/2017    Gastroesophageal reflux disease 06/30/2017    Neck pain 03/13/2017    Psoriasis 02/09/2017    Chronic LUQ pain 02/09/2017       Past Medical History:   Diagnosis Date    Adenomyosis     aug 2018    Allergy     Anxiety     Depression     Generalized hypermobility of joints     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)     Meningioma ([facility])     Jan 2017    MTHFR gene mutation     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    Psoriatic arthritis ([facility])     Urinary tract infection          OBJECTIVE:   /78 (BP Location: Left arm, Patient Position: Sitting, BP Cuff Size: Adult)   Pulse 89   Temp 36.1 °C (97 °F) (Temporal)   Resp 17   Ht 1.626 m (5' 4\")   Wt 77.6 kg (171 lb)   LMP 01/10/2025   SpO2 99%   BMI 29.35 kg/m²   General: Well-developed well-nourished female, no acute distress  Neck: supple, no lymphadenopathy- cervical or supraclavicular, no thyromegaly  Cardiovascular: regular rate and rhythm, no murmurs, gallops, rubs  Lungs: clear to auscultation bilaterally, no wheezes, crackles, or rhonchi  Abdomen: " +bowel sounds, soft.  Very slight tenderness to palpation of left epigastric region and left lower pelvic region. Nondistended, no rebound, no guarding, no hepatosplenomegaly  Extremities: no cyanosis, clubbing, edema  Skin: Warm and dry  Psych: appropriate mood and affect     Latest Reference Range & Units 01/13/25 06:27 01/18/25 09:38 01/18/25 10:00 01/18/25 11:18   WBC 4.8 - 10.8 K/uL 4.5 (L) 4.9     RBC 4.20 - 5.40 M/uL 4.86 5.03     Hemoglobin 12.0 - 16.0 g/dL 12.4 12.8     Hematocrit 37.0 - 47.0 % 38.9 39.5     MCV 81.4 - 97.8 fL 80.0 (L) 78.5 (L)     MCH 27.0 - 33.0 pg 25.5 (L) 25.4 (L)     MCHC 32.2 - 35.5 g/dL 31.9 (L) 32.4     RDW 35.9 - 50.0 fL 42.6 40.8     Platelet Count 164 - 446 K/uL 333 323     MPV 9.0 - 12.9 fL 9.8 9.0     Neutrophils-Polys 44.00 - 72.00 % 62.20 66.10     Neutrophils (Absolute) 1.82 - 7.42 K/uL 2.80 3.22     Lymphocytes 22.00 - 41.00 % 26.00 24.20     Lymphs (Absolute) 1.00 - 4.80 K/uL 1.17 1.18     Monocytes 0.00 - 13.40 % 7.60 6.00     Monos (Absolute) 0.00 - 0.85 K/uL 0.34 0.29     Eosinophils 0.00 - 6.90 % 3.30 2.50     Eos (Absolute) 0.00 - 0.51 K/uL 0.15 0.12     Basophils 0.00 - 1.80 % 0.90 1.00     Baso (Absolute) 0.00 - 0.12 K/uL 0.04 0.05     Immature Granulocytes 0.00 - 0.90 % 0.00 0.20     Immature Granulocytes (abs) 0.00 - 0.11 K/uL 0.00 0.01     Nucleated RBC 0.00 - 0.20 /100 WBC 0.00 0.00     NRBC (Absolute) K/uL 0.00 0.00     Sodium 135 - 145 mmol/L 139 138     Potassium 3.6 - 5.5 mmol/L 3.8 3.9     Chloride 96 - 112 mmol/L 104 104     Co2 20 - 33 mmol/L 25 23     Anion Gap 7.0 - 16.0  10.0 11.0     Glucose 65 - 99 mg/dL 109 (H) 121 (H)     Bun 8 - 22 mg/dL 12 10     Creatinine 0.50 - 1.40 mg/dL 0.79 0.70     GFR (CKD-EPI) >60 mL/min/1.73 m 2 94 108     Calcium 8.5 - 10.5 mg/dL 9.1 8.9     Correct Calcium 8.5 - 10.5 mg/dL 9.0 8.7     AST(SGOT) 12 - 45 U/L 21 19     ALT(SGPT) 2 - 50 U/L 25 18     Alkaline Phosphatase 30 - 99 U/L 49 50     Total Bilirubin 0.1 - 1.5  mg/dL <0.2 0.3     Albumin 3.2 - 4.9 g/dL 4.1 4.3     Total Protein 6.0 - 8.2 g/dL 6.7 6.9     Globulin 1.9 - 3.5 g/dL 2.6 2.6     A-G Ratio g/dL 1.6 1.7     Lipase 11 - 82 U/L 32 30     Urobilinogen, Urine <=1.0 EU/dL   0.2    Color    Yellow    Character    Clear    Specific Gravity <1.035    1.011    Ph 5.0 - 8.0    6.5    Glucose Negative mg/dL   Negative    Ketones Negative mg/dL   Negative    Bilirubin Negative    Negative    Occult Blood Negative    Negative    Protein Negative mg/dL   Negative    Nitrite Negative    Negative    Leukocyte Esterase Negative    Negative    Micro Urine Req    see below    Beta-Hcg Qualitative Serum Negative   Negative     EKG     Rpt   (L): Data is abnormally low  (H): Data is abnormally high  Rpt: View report in Results Review for more information        ASSESSMENT/PLAN:    44 y.o.female     1. Upper abdominal pain -reviewed ER records.  She will need further evaluation with GI.  She does have referral to GI.  She will let us know if she needs referral to specific office.  Possible gastritis versus other ulcer.  Will increase pantoprazole to 20 mg twice daily.  Continue on sucralfate.  Recommend avoid NSAIDs, acidic food and alcohol.  Recommend modified diet. Pantoprazole 20 mg twice daily      2. Ovulation pain -stable.  Continue monitor.  Avoid NSAIDs.  Consider Tylenol as needed.       3. Anxiety about health -has had some increased stressors recently.  Counseled on management of symptoms.  Continue counseling therapy.  Reviewed possible other resources.  Reviewed daily management practices.  Due to current upper GI symptoms we will hold on increasing fluoxetine 20 mg dose.  Will continue to monitor at this time.       4. Seasonal depression (HCC) -see #3.         Return in about 1 month (around 2/24/2025).    This medical record contains text that has been entered with the assistance of computer voice recognition and dictation software.  Therefore, it may contain unintended  errors in text, spelling, punctuation, or grammar.

## 2025-02-07 ENCOUNTER — APPOINTMENT (OUTPATIENT)
Dept: MEDICAL GROUP | Facility: IMAGING CENTER | Age: 45
End: 2025-02-07
Payer: COMMERCIAL

## 2025-02-27 ENCOUNTER — OFFICE VISIT (OUTPATIENT)
Dept: MEDICAL GROUP | Facility: IMAGING CENTER | Age: 45
End: 2025-02-27
Payer: COMMERCIAL

## 2025-02-27 VITALS
TEMPERATURE: 97.1 F | RESPIRATION RATE: 16 BRPM | OXYGEN SATURATION: 97 % | HEIGHT: 64 IN | SYSTOLIC BLOOD PRESSURE: 138 MMHG | HEART RATE: 80 BPM | BODY MASS INDEX: 29.06 KG/M2 | DIASTOLIC BLOOD PRESSURE: 76 MMHG | WEIGHT: 170.2 LBS

## 2025-02-27 DIAGNOSIS — F41.9 ANXIETY AND DEPRESSION: ICD-10-CM

## 2025-02-27 DIAGNOSIS — F32.A ANXIETY AND DEPRESSION: ICD-10-CM

## 2025-02-27 DIAGNOSIS — R10.10 UPPER ABDOMINAL PAIN: ICD-10-CM

## 2025-02-27 DIAGNOSIS — D22.5 ATYPICAL NEVUS OF ABDOMINAL WALL: ICD-10-CM

## 2025-02-27 ASSESSMENT — FIBROSIS 4 INDEX: FIB4 SCORE: 0.62

## 2025-02-27 NOTE — PROGRESS NOTES
"  SUBJECTIVE:    Chief Complaint   Patient presents with    Follow-Up     Upper abdomen pain   Pt report the pain moving to side now x 2 weeks   Tender back  Itchy- new last 2 weeks         HPI:     Nubia Arce is a 44 y.o. female here for follow-up of upper abdominal pain symptoms.  Has had some prior evaluation.  She is awaiting GI appointment.  Past couple weeks seems to be bit more on the left upper side of abdomen.  Notes some itching, tenderness and pressure.  Sometimes comes and goes.  May go around to the back.  No other new urinary symptoms.  Continues on sucralfate. Sometimes helps/sometimes without significant changes.   Other day some \"bubbling pain.\"  Initially epigastric region. More constipation.   Hx of complex fluid around spleen on past imaging.   Has GI appointment, endoscopy/colonoscopy 6 weeks out.   Some nausea. No blood in stools. Soft stools. Not a lot comes out. Contipated feeling, but not hard stools.   With arthritis-costochondritis.  Does not appear related to periods.  States she has cut out alcohol and stop NSAIDs.  Has cut down on coffee.  She is thinking of weaning her fluoxetine.  She has discussed possibility of adenomyosis in the past, no clear history of endometriosis.  History of left ovarian cyst.  She is scheduled for brain MRI with contrast from [place].    ROS:  No recent fevers or chills. No vomiting.  No chest pains or shortness of breath. No lower extremity edema.    Current Outpatient Medications on File Prior to Visit   Medication Sig Dispense Refill    pantoprazole (PROTONIX) 20 MG tablet Take 1 Tablet by mouth 2 times a day. 60 Tablet 1    sucralfate (CARAFATE) 1 GM Tab Take 1 Tablet by mouth 4 Times a Day,Before Meals and at Bedtime. 120 Tablet 3    progesterone (PROMETRIUM) 100 MG Cap 100 mg.      FLUoxetine (PROZAC) 20 MG Cap Take 1 Capsule by mouth every day. 90 Capsule 1    clobetasol (OLUX) 0.05 % foam Apply once daily to affected area of scalp as " needed until scalp is smooth 50 g 0    acyclovir (ZOVIRAX) 400 MG tablet Take 1 Tablet by mouth 3 times a day. Take at first sign of symptoms for 5 days. 15 Tablet 1    Methylcobalamin (B12-ACTIVE PO)       cetirizine (ZYRTEC) 10 MG Tab Take 1 Tablet by mouth every day.      Hyoscyamine Sulfate (GI COCKTAIL, HYOSCYAMINE-LIDOCAINE-MAALOX,) Take 15 mL by mouth every 6 hours as needed (abdominal pain or cramping). (Patient not taking: Reported on 1/24/2025) 200 mL 0    ibuprofen (MOTRIN) 800 MG Tab Take 1 Tablet by mouth every 8 hours as needed for Moderate Pain. (Patient not taking: Reported on 2/27/2025) 30 Tablet 0    cyclobenzaprine (FLEXERIL) 5 mg tablet Take 1-2 Tablets by mouth at bedtime as needed for Muscle Spasms. (Patient not taking: Reported on 2/27/2025) 15 Tablet 0    lidocaine (ASPERFLEX) 4 % Patch Apply patch to painful area. Patch may remain in place for up to 12 hours in any 24-hour period. No more than 1 patch can be used in a 24-hour period. (Patient not taking: Reported on 2/27/2025) 15 Patch 0    NITROFURANTOIN PO Take 50 mg by mouth every 8 hours as needed (After [place]). After [place] (Patient not taking: Reported on 2/27/2025)      estradiol (ESTRACE VAGINAL) 0.1 MG/GM vaginal cream Apply 1g cream inside vagina twice per week (Patient not taking: Reported on 2/27/2025) 1 Each 6    Ibuprofen (ADVIL PO) Advil (Patient not taking: Reported on 2/27/2025)      Fish Oil-Cholecalciferol (FISH OIL + D3 PO) Fish Oil (Patient not taking: Reported on 2/27/2025)      Ascorbic Acid (VITAMIN C) 1000 MG Tab Take  by mouth. (Patient not taking: Reported on 1/24/2025)       No current facility-administered medications on file prior to visit.       Allergies   Allergen Reactions    Doxycycline Vomiting     2016        Patient Active Problem List    Diagnosis Date Noted    Right sided weakness 06/05/2024    Itching 04/16/2021    Urinary symptom or sign 04/16/2021    Tension-type headache, not intractable  "03/26/2020    H. pylori infection 03/26/2020    Dense breast tissue 07/12/2019    MTHFR gene mutation     Breast tenderness in female 01/17/2019    Acquired hypothyroidism 11/07/2018    Iron deficiency anemia due to chronic blood loss 07/27/2018    Dysesthesia 05/25/2018    Meningioma (HCC) 05/25/2018    Lymphadenopathy of head and neck 02/01/2018    Abnormal brain MRI 01/23/2018    Pineal gland cyst 01/23/2018    Dizziness 12/29/2017    Seasonal depression (HCC) 12/29/2017    Right sided numbness 09/05/2017    Decreased sensation of lower extremity 06/30/2017    Gastroesophageal reflux disease 06/30/2017    Neck pain 03/13/2017    Psoriasis 02/09/2017    Chronic LUQ pain 02/09/2017       Past Medical History:   Diagnosis Date    Adenomyosis     aug 2018    Allergy     Anxiety     Depression     Generalized hypermobility of joints     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)     Meningioma (HCC)     Jan 2017    MTHFR gene mutation     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    Psoriatic arthritis ([facility])     Urinary tract infection          OBJECTIVE:   /76 (BP Location: Right leg, Patient Position: Sitting, BP Cuff Size: Adult)   Pulse 80   Temp 36.2 °C (97.1 °F) (Temporal)   Resp 16   Ht 1.626 m (5' 4\")   Wt 77.2 kg (170 lb 3.2 oz)   LMP 01/27/2025   SpO2 97%   BMI 29.21 kg/m²   General: Well-developed well-nourished female, no acute distress  Neck: supple, no lymphadenopathy- cervical or supraclavicular, no thyromegaly  Cardiovascular: regular rate and rhythm, no murmurs, gallops, rubs  Lungs: clear to auscultation bilaterally, no wheezes, crackles, or rhonchi  Abdomen: +bowel sounds, soft, nontender, nondistended, no rebound, no guarding, no hepatosplenomegaly, no CVAT  Extremities: no cyanosis, clubbing, edema  Skin: Warm and dry.  Left abdominal region with slightly irregular border of ~4 mm hyperpigmented lesion.  Psych: appropriate mood and affect        ASSESSMENT/PLAN:    44 y.o.female       1. Upper " abdominal pain  MR-ABDOMEN-WITH & W/O      2. Anxiety and depression        3. Atypical nevus of abdominal wall        -Upper abdominal pain-has had prior evaluation.  Will assess further with imaging.  If any worsening symptoms patient to contact office and we will consider further labs and imaging at the time as needed.  She will plan to follow-up with GI.  Continue on sucralfate and pantoprazole therapy.  Continue diet modification. Consider trial of prime tea.  Discussed consideration for further evaluation of possible gynecological etiology such as endometriosis.  -Anxiety/depression-stable.  May consider taking fluoxetine 20 mg every other day.  Monitor.  -Slight irregularly of nevus of abdomen.  She is scheduled to see dermatology tomorrow.    Return in about 2 months (around 4/27/2025).  Patient to contact office or follow-up sooner as needed.    This medical record contains text that has been entered with the assistance of computer voice recognition and dictation software.  Therefore, it may contain unintended errors in text, spelling, punctuation, or grammar.

## 2025-02-28 ENCOUNTER — OFFICE VISIT (OUTPATIENT)
Dept: DERMATOLOGY | Facility: IMAGING CENTER | Age: 45
End: 2025-02-28
Payer: COMMERCIAL

## 2025-02-28 DIAGNOSIS — L40.9 PSORIASIS: ICD-10-CM

## 2025-02-28 DIAGNOSIS — L82.1 SK (SEBORRHEIC KERATOSIS): ICD-10-CM

## 2025-02-28 DIAGNOSIS — D22.9 MULTIPLE NEVI: ICD-10-CM

## 2025-02-28 DIAGNOSIS — Z12.83 SKIN CANCER SCREENING: ICD-10-CM

## 2025-02-28 DIAGNOSIS — L90.8 SKIN AGING: ICD-10-CM

## 2025-02-28 DIAGNOSIS — D18.01 CHERRY ANGIOMA: ICD-10-CM

## 2025-02-28 DIAGNOSIS — D23.9 DERMATOFIBROMA: ICD-10-CM

## 2025-02-28 DIAGNOSIS — L81.4 LENTIGINES: ICD-10-CM

## 2025-02-28 DIAGNOSIS — L60.8 LONGITUDINAL MELANONYCHIA: ICD-10-CM

## 2025-02-28 NOTE — PROGRESS NOTES
CC: ANTOLIN    Subjective:  Previously seen patient here for ANTOLIN  Reports right thumb nail pigment change X year+, not growing. Has been evaluated by another Derm at [facility] - does not recall name.   Last ANTOLIN prev done by Yessica mcdonald on 01/16/24 and evaluated mole on abdomen. Has photos today - no known changes and no growth.     HPI/location: Left abdomen  Time present:Was seen last on 01/16/24  Painful lesion: No  Itching lesion: No  Enlarging lesion: not known    History of skin cancer: No  History of precancers/actinic keratoses: No  History of biopsies:No  History of blistering/severe sunburns:No  Family history of skin cancer:No  Family history of atypical moles:Yes, Details: mom unknown     ROS: no fevers/chills. No itch.  No cough  Relevant PMH:mult med comorbidities  Social: NS    PE: Gen:WDWN female in NAD. Skin: Scalp/face/eyes/lips/neck/chest/back/arms/legs/hands/feet/buttocks - without suspicious lesions noted.  Genitals exam declined  -scattered vascular macules/papules on torso  -scattered hyperpigmented macules/papules appearing on torso/extremities, appearing benign without suspicious features  -5 X 3.5-4mm hyperpigmented macule clover-like shape on left lower abdomen, appearing benign. Network on exam, no globules.  -right thumb nail plate with faint longitudinal striation, apprx 2mm      A/P:  Benign appearing skin lesions: nevi/lentigos/SK/cherry angioma:  -reviewed sunprotection/detection  -ABCDEs reviewed and handout supplied  -f/u PRN growth/changes/suspicious features    Longitudinal melanonychia:right thumb:  -monitor for widening/worsening and f/u PRN    Hx of PSO: appearing controlled today  -home supply Olux foam Qday    F/u 1 year/PRN    I have reviewed medications relevant to my specialty.

## 2025-05-23 ENCOUNTER — OFFICE VISIT (OUTPATIENT)
Dept: MEDICAL GROUP | Facility: IMAGING CENTER | Age: 45
End: 2025-05-23
Payer: COMMERCIAL

## 2025-05-23 VITALS
HEART RATE: 95 BPM | RESPIRATION RATE: 16 BRPM | TEMPERATURE: 97.3 F | OXYGEN SATURATION: 100 % | HEIGHT: 64 IN | DIASTOLIC BLOOD PRESSURE: 80 MMHG | BODY MASS INDEX: 28.89 KG/M2 | WEIGHT: 169.2 LBS | SYSTOLIC BLOOD PRESSURE: 126 MMHG

## 2025-05-23 DIAGNOSIS — R73.09 ELEVATED GLUCOSE LEVEL: ICD-10-CM

## 2025-05-23 DIAGNOSIS — F41.9 ANXIETY AND DEPRESSION: ICD-10-CM

## 2025-05-23 DIAGNOSIS — L40.50 PSORIATIC ARTHRITIS (HCC): ICD-10-CM

## 2025-05-23 DIAGNOSIS — D32.9 MENINGIOMA (HCC): ICD-10-CM

## 2025-05-23 DIAGNOSIS — F32.A ANXIETY AND DEPRESSION: ICD-10-CM

## 2025-05-23 DIAGNOSIS — H92.01 RIGHT EAR PAIN: ICD-10-CM

## 2025-05-23 DIAGNOSIS — M79.10 MUSCLE TENSION PAIN: ICD-10-CM

## 2025-05-23 DIAGNOSIS — E66.3 OVERWEIGHT (BMI 25.0-29.9): ICD-10-CM

## 2025-05-23 DIAGNOSIS — K21.9 GASTROESOPHAGEAL REFLUX DISEASE, UNSPECIFIED WHETHER ESOPHAGITIS PRESENT: ICD-10-CM

## 2025-05-23 DIAGNOSIS — Z83.3 FAMILY HISTORY OF DIABETES MELLITUS (DM): ICD-10-CM

## 2025-05-23 DIAGNOSIS — R45.89 ANXIETY ABOUT HEALTH: ICD-10-CM

## 2025-05-23 LAB
HBA1C MFR BLD: 5.6 % (ref ?–5.8)
POCT INT CON NEG: NEGATIVE
POCT INT CON POS: POSITIVE

## 2025-05-23 PROCEDURE — 83036 HEMOGLOBIN GLYCOSYLATED A1C: CPT | Performed by: FAMILY MEDICINE

## 2025-05-23 PROCEDURE — 3074F SYST BP LT 130 MM HG: CPT | Performed by: FAMILY MEDICINE

## 2025-05-23 PROCEDURE — 3079F DIAST BP 80-89 MM HG: CPT | Performed by: FAMILY MEDICINE

## 2025-05-23 PROCEDURE — 99214 OFFICE O/P EST MOD 30 MIN: CPT | Performed by: FAMILY MEDICINE

## 2025-05-23 RX ORDER — SEMAGLUTIDE 0.25 MG/.5ML
0.25 INJECTION, SOLUTION SUBCUTANEOUS
Qty: 0.5 ML | Refills: 1 | Status: SHIPPED | OUTPATIENT
Start: 2025-05-23

## 2025-05-23 ASSESSMENT — FIBROSIS 4 INDEX: FIB4 SCORE: 0.62

## 2025-05-23 NOTE — LETTER
[facility]  Yessica Duke M.D.  [address]   [place] [address]  Fax: [phone]   Authorization for Release/Disclosure of   Protected Health Information   Name: NUBIA ARCE : 1980 SSN: [id number]   Address: [address] [place] [address]  [place] [address] Phone:    [phone] (home)    I authorize the entity listed below to release/disclose the PHI below to:   [facility]/Yessica Duke M.D. and Yessica Duke M.D.   Provider or Entity Name:     Address   City, State, Zip   Phone:      Fax:     Reason for request: continuity of care   Information to be released:    [  ] LAST COLONOSCOPY,  including any PATH REPORT and follow-up  [  ] LAST FIT/COLOGUARD RESULT [  ] LAST DEXA  [  ] LAST MAMMOGRAM  [  ] LAST PAP  [  ] LAST LABS [  ] RETINA EXAM REPORT  [  ] IMMUNIZATION RECORDS  [  ] Release all info      [  ] Check here and initial the line next to each item to release ALL health information INCLUDING  _____ Care and treatment for drug and / or alcohol abuse  _____ HIV testing, infection status, or AIDS  _____ Genetic Testing    DATES OF SERVICE OR TIME PERIOD TO BE DISCLOSED: _____________  I understand and acknowledge that:  * This Authorization may be revoked at any time by you in writing, except if your health information has already been used or disclosed.  * Your health information that will be used or disclosed as a result of you signing this authorization could be re-disclosed by the recipient. If this occurs, your re-disclosed health information may no longer be protected by State or Federal laws.  * You may refuse to sign this Authorization. Your refusal will not affect your ability to obtain treatment.  * This Authorization becomes effective upon signing and will  on (date) __________.      If no date is indicated, this Authorization will  one (1) year from the signature date.    Name: Nubia Arce  Signature: Date:   2025     PLEASE FAX REQUESTED RECORDS BACK  TO: [phone]

## 2025-05-23 NOTE — PROGRESS NOTES
SUBJECTIVE:    Chief Complaint   Patient presents with    Follow-Up     BP  And mom was diagnosis with Diabetes   Consult about GLP-1    Other      Rt Ear achy   Chronic rt shoulder and neck area and clinching jaw  Level 3 to 5       HPI:     Nubia Arce is a 44 y.o. female with psoriasis, anxiety/depression, GERD, and meningioma/pineal gland cyst here for follow-up of medical issues and request for consideration of medication therapy.  Also, notes some right side ear ache and chronic right shoulder/symptoms.    She has had multiple appointments and testing with GI- upper and lower scope done.  Found to have GERD.  No other significant findings at this time. On pantoprazole routinely     She had her [facility] appointment- meningioma, new doctor not comfortable with progesterone therapy.  She feels the progesterone therapy was helping with symptoms - mood, sleep, etc.   Trying to move more. Started weight watchers.   Past 5 years, tracking and moving more but not a lot of weight loss.   Perimenopausal as well.     Psoriatic arthritis-she may consider humira for joint/stiffness.  Mother diagnosed with diabetes recently.   Stress is better as she is done working on a musical.  Had more stress during the springtime.       Anxiety/depression-seeing therapist-diagnosed with anxiety about health and moderate OCD.  Currently undergoing exposure therapy  for treatment.   Patient has had history of multiple traumatic factors such as miscarriage, D&C, brain tumor, and also knows diagnosis of glioblastoma in other people.  She also has had to have a spinal tap which was difficult.  Son has also had some health issues-arthritis, bone disease, neuro issues when younger at 8 months-12 months of age.  Feels she was struggling struggling before then.   Father had psoriatic arthritis-diagnosed later, possibly self medicated prior as he had hx of alcohol use.  Mother left with her when she was 4 years old.    She notes  "some chronic right shoulder neck symptoms as she clenches her jaw.  Right dull earache symptoms.  Body mass index is 29.04 kg/m².  Feels her blood pressure is gradually increasing.    ROS:  No recent fevers or chills. No nausea or vomiting. No diarrhea. No chest pains or shortness of breath. No lower extremity edema.    Medications Ordered Prior to Encounter[1]    Allergies[2]    Patient Active Problem List    Diagnosis Date Noted    Right sided weakness 06/05/2024    Itching 04/16/2021    Urinary symptom or sign 04/16/2021    Tension-type headache, not intractable 03/26/2020    H. pylori infection 03/26/2020    Dense breast tissue 07/12/2019    MTHFR gene mutation     Breast tenderness in female 01/17/2019    Acquired hypothyroidism 11/07/2018    Iron deficiency anemia due to chronic blood loss 07/27/2018    Dysesthesia 05/25/2018    Meningioma (HCC) 05/25/2018    Lymphadenopathy of head and neck 02/01/2018    Abnormal brain MRI 01/23/2018    Pineal gland cyst 01/23/2018    Dizziness 12/29/2017    Seasonal depression (HCC) 12/29/2017    Right sided numbness 09/05/2017    Decreased sensation of lower extremity 06/30/2017    Gastroesophageal reflux disease 06/30/2017    Neck pain 03/13/2017    Psoriasis 02/09/2017    Chronic LUQ pain 02/09/2017       Past Medical History[3]      OBJECTIVE:   /80 (BP Location: Left arm, Patient Position: Sitting, BP Cuff Size: Adult)   Pulse 95   Temp 36.3 °C (97.3 °F) (Temporal)   Resp 16   Ht 1.626 m (5' 4\")   Wt 76.7 kg (169 lb 3.2 oz)   LMP 04/28/2025   SpO2 100%   BMI 29.04 kg/m²   General: Well-developed well-nourished female, no acute distress  HEENT: oropharynx clear, eyes clear, ears clear, TMs clear and intact  Neck: supple, no lymphadenopathy- cervical or supraclavicular, no thyromegaly.  Bilateral lateral neck area with tightness of neck muscles, no spinal tenderness to palpation, adequate range of motion.  Cardiovascular: regular rate and rhythm, no murmurs, " gallops, rubs  Lungs: clear to auscultation bilaterally, no wheezes, crackles, or rhonchi  Abdomen: +bowel sounds, soft, nontender, nondistended, no rebound, no guarding, no hepatosplenomegaly  Extremities: no cyanosis, clubbing, edema.  Right upper shoulder region with slight tenderness to palpation.  Skin: Warm and dry  Psych: appropriate mood and affect     Latest Reference Range & Units 05/23/25 10:19   Glycohemoglobin <=5.8 % 5.6       ASSESSMENT/PLAN:    44 y.o.female     1. Gastroesophageal reflux disease, unspecified whether esophagitis present    -has been evaluated by GI.  Continue to modify diet and continue on pantoprazole therapy.  Monitor. Semaglutide (WEGOVY) 0.25 MG/0.5ML Solution Auto-injector Pen-injector      2. Psoriatic arthritis (HCC)   Patient may consider medication therapy per rheumatology.       3. Anxiety and depression -stable.  Multifactorial.  Continue counseling therapy.       4. Anxiety about health -stable.  Continue counseling therapy.       5. Elevated glucose level   Monitor. POCT  A1C    Semaglutide (WEGOVY) 0.25 MG/0.5ML Solution Auto-injector Pen-injector      6. Family history of diabetes mellitus (DM)  Semaglutide (WEGOVY) 0.25 MG/0.5ML Solution Auto-injector Pen-injector      7. Meningioma (HCC)-stable.  Followed by [facility].  Continue routine follow-up with specialist.       8. Right ear pain -unremarkable exam.  Likely associated with some inflammation and tension of right side neck and shoulder area.   See #9.       9. Muscle tension pain   Modify activities.  Consider massage therapy, acupuncture therapy or chiropractic therapy.  Consider other medication and supplement therapy as needed.       10. Overweight (BMI 25.0-29.9)   Weight may further contribute to muscle and joint symptoms as well as glucose levels.  Patient desires to start medication therapy.   Semaglutide (WEGOVY) 0.25 MG/0.5ML Solution Auto-injector Pen-injector      Recommend continue healthy  diet/anti-inflammatory diet and routine exercise as tolerated.  Recommend continue routine self-care activities.  Counseled on recommendations for routine stress management and consideration of trauma therapy in future.  Continue routine counseling therapy with therapist.    Return in about 6 weeks (around 7/4/2025).    This medical record contains text that has been entered with the assistance of computer voice recognition and dictation software.  Therefore, it may contain unintended errors in text, spelling, punctuation, or grammar.                                 [1]   Current Outpatient Medications on File Prior to Visit   Medication Sig Dispense Refill    pantoprazole (PROTONIX) 20 MG tablet Take 1 Tablet by mouth 2 times a day. 60 Tablet 1    clobetasol (OLUX) 0.05 % foam Apply once daily to affected area of scalp as needed until scalp is smooth 50 g 0    ibuprofen (MOTRIN) 800 MG Tab Take 1 Tablet by mouth every 8 hours as needed for Moderate Pain. 30 Tablet 0    NITROFURANTOIN PO Take 50 mg by mouth every 8 hours as needed (After [place]). After [place]      estradiol (ESTRACE VAGINAL) 0.1 MG/GM vaginal cream Apply 1g cream inside vagina twice per week 1 Each 6    acyclovir (ZOVIRAX) 400 MG tablet Take 1 Tablet by mouth 3 times a day. Take at first sign of symptoms for 5 days. 15 Tablet 1    Ibuprofen (ADVIL PO) Advil      Methylcobalamin (B12-ACTIVE PO)       cetirizine (ZYRTEC) 10 MG Tab Take 1 Tablet by mouth every day.      Ascorbic Acid (VITAMIN C) 1000 MG Tab Take  by mouth.      sucralfate (CARAFATE) 1 GM Tab Take 1 Tablet by mouth 4 Times a Day,Before Meals and at Bedtime. (Patient not taking: Reported on 5/23/2025) 120 Tablet 3    Hyoscyamine Sulfate (GI COCKTAIL, HYOSCYAMINE-LIDOCAINE-MAALOX,) Take 15 mL by mouth every 6 hours as needed (abdominal pain or cramping). (Patient not taking: Reported on 5/23/2025) 200 mL 0    progesterone (PROMETRIUM) 100 MG Cap 100 mg. (Patient not taking:  Reported on 5/23/2025)      FLUoxetine (PROZAC) 20 MG Cap Take 1 Capsule by mouth every day. (Patient not taking: Reported on 5/23/2025) 90 Capsule 1    cyclobenzaprine (FLEXERIL) 5 mg tablet Take 1-2 Tablets by mouth at bedtime as needed for Muscle Spasms. (Patient not taking: Reported on 5/23/2025) 15 Tablet 0    lidocaine (ASPERFLEX) 4 % Patch Apply patch to painful area. Patch may remain in place for up to 12 hours in any 24-hour period. No more than 1 patch can be used in a 24-hour period. (Patient not taking: Reported on 5/23/2025) 15 Patch 0    Fish Oil-Cholecalciferol (FISH OIL + D3 PO) Fish Oil (Patient not taking: Reported on 5/23/2025)       No current facility-administered medications on file prior to visit.   [2]   Allergies  Allergen Reactions    Doxycycline Vomiting     2016    [3]   Past Medical History:  Diagnosis Date    Adenomyosis     aug 2018    Allergy     Anxiety     Depression     Generalized hypermobility of joints     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)     Meningioma (HCC)     Jan 2017    MTHFR gene mutation     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    Psoriatic arthritis (HCC)     Urinary tract infection

## 2025-07-18 ENCOUNTER — APPOINTMENT (OUTPATIENT)
Dept: MEDICAL GROUP | Facility: IMAGING CENTER | Age: 45
End: 2025-07-18
Payer: COMMERCIAL

## 2025-07-30 DIAGNOSIS — L40.9 PSORIASIS: ICD-10-CM

## 2025-08-04 RX ORDER — CLOBETASOL PROPIONATE 0.5 MG/G
AEROSOL, FOAM TOPICAL
Qty: 50 G | Refills: 0 | Status: SHIPPED | OUTPATIENT
Start: 2025-08-04

## 2025-08-27 ENCOUNTER — TELEMEDICINE (OUTPATIENT)
Dept: MEDICAL GROUP | Facility: IMAGING CENTER | Age: 45
End: 2025-08-27
Payer: COMMERCIAL

## 2025-08-27 VITALS — DIASTOLIC BLOOD PRESSURE: 96 MMHG | BODY MASS INDEX: 27.64 KG/M2 | SYSTOLIC BLOOD PRESSURE: 141 MMHG | WEIGHT: 161 LBS

## 2025-08-27 DIAGNOSIS — E66.3 OVERWEIGHT (BMI 25.0-29.9): ICD-10-CM

## 2025-08-27 DIAGNOSIS — Z86.16 HISTORY OF COVID-19: ICD-10-CM

## 2025-08-27 DIAGNOSIS — Z79.890 HORMONE REPLACEMENT THERAPY: ICD-10-CM

## 2025-08-27 DIAGNOSIS — G47.9 SLEEPING DIFFICULTY: ICD-10-CM

## 2025-08-27 DIAGNOSIS — R03.0 ELEVATED BLOOD PRESSURE READING: ICD-10-CM

## 2025-08-27 DIAGNOSIS — R45.89 ANXIETY ABOUT HEALTH: ICD-10-CM

## 2025-08-27 RX ORDER — AMLODIPINE BESYLATE 2.5 MG/1
2.5 TABLET ORAL DAILY
Qty: 30 TABLET | Refills: 3 | Status: SHIPPED | OUTPATIENT
Start: 2025-08-27 | End: 2026-10-01

## 2025-08-27 ASSESSMENT — FIBROSIS 4 INDEX: FIB4 SCORE: 0.62

## 2025-12-10 ENCOUNTER — APPOINTMENT (OUTPATIENT)
Dept: MEDICAL GROUP | Facility: IMAGING CENTER | Age: 45
End: 2025-12-10
Payer: COMMERCIAL